# Patient Record
Sex: MALE | Race: WHITE | Employment: OTHER | URBAN - METROPOLITAN AREA
[De-identification: names, ages, dates, MRNs, and addresses within clinical notes are randomized per-mention and may not be internally consistent; named-entity substitution may affect disease eponyms.]

---

## 2017-02-27 ENCOUNTER — OFFICE VISIT (OUTPATIENT)
Dept: CARDIOLOGY CLINIC | Age: 67
End: 2017-02-27

## 2017-02-27 ENCOUNTER — OFFICE VISIT (OUTPATIENT)
Dept: NEUROLOGY | Age: 67
End: 2017-02-27

## 2017-02-27 ENCOUNTER — TELEPHONE (OUTPATIENT)
Dept: NEUROLOGY | Age: 67
End: 2017-02-27

## 2017-02-27 VITALS
BODY MASS INDEX: 36.58 KG/M2 | OXYGEN SATURATION: 98 % | HEART RATE: 62 BPM | DIASTOLIC BLOOD PRESSURE: 86 MMHG | WEIGHT: 247 LBS | SYSTOLIC BLOOD PRESSURE: 140 MMHG | HEIGHT: 69 IN

## 2017-02-27 VITALS
SYSTOLIC BLOOD PRESSURE: 128 MMHG | OXYGEN SATURATION: 98 % | BODY MASS INDEX: 36.67 KG/M2 | HEIGHT: 69 IN | HEART RATE: 63 BPM | DIASTOLIC BLOOD PRESSURE: 86 MMHG | WEIGHT: 247.6 LBS

## 2017-02-27 DIAGNOSIS — I10 ESSENTIAL HYPERTENSION: ICD-10-CM

## 2017-02-27 DIAGNOSIS — E78.2 MIXED HYPERLIPIDEMIA: ICD-10-CM

## 2017-02-27 DIAGNOSIS — I25.10 CORONARY ARTERY DISEASE INVOLVING NATIVE CORONARY ARTERY OF NATIVE HEART WITHOUT ANGINA PECTORIS: Primary | Chronic | ICD-10-CM

## 2017-02-27 DIAGNOSIS — M48.062 NEUROGENIC CLAUDICATION DUE TO LUMBAR SPINAL STENOSIS: Primary | ICD-10-CM

## 2017-02-27 NOTE — PROGRESS NOTES
Name:  Melecio Jorge      :  1950    PCP:   Yakelin Healy MD      Referring:  Self  MRN:   [id number]    Chief Complaint:   Chief Complaint   Patient presents with    Numbness     bilateral leg    Back Pain     L4-L5       HISTORY OF PRESENT ILLNESS:     This is a 79 y.o. male with a hx of lumbar spinal stenosis, cardiac stent who presents for evaluation of lower back pain and bilateral leg numbness. Patient describes that he was diagnosed with lumbar spinal stenosis (L4-5) around a year ago and was seen by Dr. Ewa Douglass. Underwent what sounds like 2 transforaminal epidural steroid injections (one time left side, one time right side) but only provided him a few days of relief after each one. Had a 3rd epidural steroid injection done by Dr. Chey Garrett in the early summer of  and patient says it lasted much longer, maybe 5-6 months. He recently got a 2nd orthopedic opinion from Dr. Shaila Whitney, and patient says that Dr. Anabel Elias recommended that patient undergo lumbar spine surgery. Patient's daughter advised patient to get Neurology input regarding management of his back pain before deciding whether to pursue surgery. 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 In terms of his pain, he describes that the more he stands and walks, he'll get increasing pain and pressure across the lower back and upper buttocks, intermittent tingling in his legs, and sciatic-radicular pain in either leg. The back pain is worse (moderate to severe) toward the end of the day or if he's been ambulating for long periods. He's found that doing graduated exercise in the mornings seems to reduce his pain throughout and toward the end of the day. Denies any constant, progressing weakness or numbness of either leg.   No difficulty controlling bowel or bladder. No MRI L-spine report or images available to review today    Complete Review of Systems: reviewed on intake H&P; refer to media section; otherwise as noted in HPI     Allergies   Allergen Reactions    Niaspan [Niacin] Other (comments)     Flushing and itching     Past Medical History:   Diagnosis Date    CAD (coronary artery disease)     Essential hypertension     Hyperlipidemia     Hypertension      Current Outpatient Prescriptions   Medication Sig Dispense Refill    metoprolol succinate (TOPROL-XL) 50 mg XL tablet TAKE 1 TABLET BY MOUTH DAILY 90 Tab 0    lisinopril (PRINIVIL, ZESTRIL) 10 mg tablet TAKE 1 TABLET BY MOUTH EVERY DAY 90 Tab 0    rosuvastatin (CRESTOR) 20 mg tablet Take 1 Tab by mouth nightly. 90 Tab 3    Cholecalciferol, Vitamin D3, (VITAMIN D3) 1,000 unit cap Take  by mouth.  tadalafil (CIALIS) 5 mg tablet Take 5 mg by mouth as needed.  multivitamin (ONE A DAY) tablet Take 1 Tab by mouth daily.  aspirin delayed-release 81 mg tablet Take 81 mg by mouth daily.  omega-3 fatty acids-vitamin e (FISH OIL) 1,000 mg cap Take 1 Cap by mouth.  buPROPion (WELLBUTRIN) 100 mg tablet Take  by mouth two (2) times a day. Past Surgical History:   Procedure Laterality Date    CARDIAC CATHETERIZATION  9/29/2011    LVEDP 23, LVG EF 50%, moderate inferior HK, LM normal, LAD calcified prox 60%, circumflex prox stent patent, OM 1 prox stent patent, RCA long prox/mid stent with 99% in-stent restenosis, T2 flow.  Distal short stent probably patent but slow filling hampers assessment    HX HEART CATHETERIZATION      x2  and then     HX PTCA      2 stents with  2004 then another stent  2005    STENT INSERTION  9/29/11    PCI of RCA with 3.0 x 18 mm Xience (mid) and 2.75 x 15 Xience (distal)    STRESS TEST CARDIOLITE  5/4/11    11 min, basal and mid inferior ischemia, EF 56%     Family History   Problem Relation Age of Onset    Coronary Artery Disease Mother     Coronary Artery Disease Father      Social History     Social History    Marital status: LEGALLY      Spouse name: N/A    Number of children: N/A    Years of education: N/A     Occupational History    dialysis nurse      Social History Main Topics    Smoking status: Never Smoker    Smokeless tobacco: Never Used    Alcohol use 1.2 oz/week     2 Glasses of wine, 0 Standard drinks or equivalent per week      Comment: one drink a week     Drug use: No    Sexual activity: Not Currently     Other Topics Concern    Not on file     Social History Narrative       PHYSICAL EXAM  Vitals:    02/27/17 0959   BP: 140/86   BP 1 Location: Left arm   BP Patient Position: Sitting   Pulse: 62   SpO2: 98%   Weight: 112 kg (247 lb)   Height: 5' 9\" (1.753 m)     MSK:  Lumbar spine  Mild tenderness in lower lubmar paraspinal area  No pain with back flexoin or standing upright  Mild pain with back extensoin  No worsening with facet loading to either side      General:  Alert, cooperative, NAD   Head:  Normocephalic, atraumatic. Eyes:  Conjunctivae/corneas clear   Lungs:  Heart:  Mild wheezing on left side (pt reports having a recent cold)  Regular rate, rhythm   Extremities: No edema. Skin: No rashes    Neurologic Exam       Language: normal  Memory:  Alert and oriented to person, place situation    Cranial Nerves:  I: smell Not tested   II: visual fields Grossly normal bilaterally   II: pupils Equal, round    II: optic disc Not tested, not relevant   III,VII: ptosis none   III,IV,VI: extraocular muscles  normal   V: mastication Not tested, not relevant   V: facial light touch sensation  Not tested, not relevant   VII: facial muscle function  symmetric   VIII: hearing symmetric   IX: soft palate elevation  Not tested, not relevant   XI: sternocleidomastoid strength 5/5   XII: tongue  midline      Motor: symmetric bulk and 5/ 5 strength in all extremities.   Sensory:    Mild reduced pinprick in left lateral lower leg, otherwise intact in both feet and legs. Cerebellar: No rest tremor on either side. Reflexes:   Trace reflexes at patellars and achilles (symmetric) Gait: normal       No outside clinic notes/ imaging reports available for review    ASSESSMENT AND PLAN    ICD-10-CM ICD-9-CM    1. Neurogenic claudication due to lumbar spinal stenosis M48.06 724.03      79 y.o. male with hx of lumbar spinal stenosis, neurogenic claudication    Worsening back pain and intermittent numbness and sciatica in both legs. Patient is interested in pursuing another lumbar SHERRY so that he can extend the time until he opts to have lumbar spine surgery. I discussed with him that its hard for me to formulate a plan today without reviewing his MRI report and images, and it would also be helpful to know the details of his last lumbar SHERRY as that one seemed to worked the best for him. He signed a SHERLYN to so I could get MRI L-spine report and the last injection report from Dr. Joaquín Valadez, and he left his last MRI CD so I can review. Pt will be going out of town for 1 month and when he returns, he was asked to make a follow up visit so we can go over the outside records and decide on which level of lumbar SHERRY to do for him. Marcel Stallings MD      Addendum/ 2-23-19:   Reviewed MRI L-spine image and MRI L-spine report. MRI images show disc bulge at L4-5 (moderate) and L5-S1 (mild) with what looks like severe spinal stenosis at L4-5, compressing the descending nerve roots. MRI report indicates the same. Additional information is that there doesn't appear to be any significant foraminal stenosis at L4-5, no significant spinal stenosis at L5-S1, and questionable right foraminal stenosis at right L5-S1. There is also a mild disc bulge at L1-2 with minimal spinal stenosis at that level, and multi-level facet arthropathy, mild at most levels, moderate at L4-5.

## 2017-02-27 NOTE — PROGRESS NOTES
Office Follow-up    NAME: Robbie Prater   :  1950  MRM:  [id number]    Date:  2017            Assessment:     Problem List  Date Reviewed: 10/30/2015          Codes Class Noted    Neurogenic claudication due to lumbar spinal stenosis ICD-10-CM: M48.06  ICD-9-CM: 724.03  2017        Prediabetes (Chronic) ICD-10-CM: R73.03  ICD-9-CM: 790.29  10/4/2011        HTN (hypertension) ICD-10-CM: I10  ICD-9-CM: 401.9  10/4/2011        Mixed hyperlipidemia ICD-10-CM: W54.9  ICD-9-CM: 272.2  2011    Overview Addendum 2015  9:53 AM by Shira Gomez MD     Lipids 10/5/10 -   HDL 39  LDLp- 2157, LDL-C 102   Lipids 10/5/10 -   HDL 34   Lipids 10/16/10 -  LDL 31 HDL 33 ,              CAD (coronary artery disease) (Chronic) ICD-10-CM: I25.10  ICD-9-CM: 414.00  2011    Overview Addendum 2011  8:33 AM by LESLEY Palomino     Inferior MI 2004  CATH:  2004- 2v CAD. LAD prox 40%, OM 80-90%, %  - ------PCI RCA with  stent  --------Staged PCI OM Aug 2004  AP 2005  CATH: 2005- new prox circ subtotal occlusion s/p PCI x 2 stents (Cypher), patent OM and RCA stents, EF 45% Maryblanche Packer [facility])  CATH: 11: LVEDP 23, LVG EF 50%, moderate inferior HK, LM normal, LAD calcified prox 60%, circumflex prox stent patent, OM 1 prox stent patent, RCA long prox/mid stent with 99% in-stent restenosis, T2 flow. Distal short stent probably patent but slow filling hampers assessment  ------PCI of RCA with 3.0 x 18 mm Xience (mid) and 2.75 x 15 Xience (distal)                      Plan:     1. CAD/ RCA stent/ LAD 60%: new symptoms of mild SOB and fatigue. We will do a stress nuclear study to rule out progression of CAD. 2. HTN: Stable. Continue current meds. 3. Dyslipidemia: continue crestor. Review labs from Partner MD performed today. 4. WESLEY: CPAP compliant. 5. FU in 6 months. Subjective:     carissa Villarreal 79 y.o. year-old who presents for 1 year follow up. He reports symptoms of claudication, tingling, numbness in legs approximately a year ago and was diagnosed with lumbar stenosis and has had multiple cortisone injections. He recently was told he likely needs surgery for this. He also reports recent mild fatigue and SOB. He is exercising as much as he can with symptoms of spinal stenosis. Exam:     Physical Exam:  Visit Vitals    /86 (BP 1 Location: Left arm, BP Patient Position: Sitting)    Pulse 63    Ht 5' 9\" (1.753 m)    Wt 247 lb 9.6 oz (112.3 kg)    SpO2 98%    BMI 36.56 kg/m2     General appearance - alert, well appearing, and in no distress  Mental status - affect appropriate to mood  Eyes - sclera anicteric, moist mucous membranes  Neck - supple, no significant adenopathy  Chest - clear to auscultation, no wheezes, rales or rhonchi  Heart - normal rate, regular rhythm, normal S1, S2, no murmurs, rubs, clicks or gallops  Abdomen - soft, nontender, nondistended, no masses or organomegaly  Extremities - peripheral pulses normal, no pedal edema  Skin - normal coloration  no rashes    Medications:     Current Outpatient Prescriptions   Medication Sig    metoprolol succinate (TOPROL-XL) 50 mg XL tablet TAKE 1 TABLET BY MOUTH DAILY    lisinopril (PRINIVIL, ZESTRIL) 10 mg tablet TAKE 1 TABLET BY MOUTH EVERY DAY    rosuvastatin (CRESTOR) 20 mg tablet Take 1 Tab by mouth nightly.  Cholecalciferol, Vitamin D3, (VITAMIN D3) 1,000 unit cap Take  by mouth.  multivitamin (ONE A DAY) tablet Take 1 Tab by mouth daily.  aspirin delayed-release 81 mg tablet Take 81 mg by mouth daily.  omega-3 fatty acids-vitamin e (FISH OIL) 1,000 mg cap Take 1 Cap by mouth.  tadalafil (CIALIS) 5 mg tablet Take 5 mg by mouth as needed.  buPROPion (WELLBUTRIN) 100 mg tablet Take  by mouth two (2) times a day. No current facility-administered medications for this visit.        Diagnostic Data Review: EKG:    Stress test cardiolite:5/4/11- basal and mid inferior ischemia, EF 56%      Cardiac catheterization:9/29/2011- LVEDP 23, LVG EF 50%, moderate inferior HK, LM normal, LAD calcified prox 60%, circumflex prox stent patent, OM 1 prox stent patent, RCA long prox/mid stent with 99% in-stent restenosis, T2 flow. Distal short stent probably patent but slow filling hampers assessment    Stent insertion:9/29/11- PCI of RCA with 3.0 x 18 mm Xience (mid) and 2.75 x 15 Xience (distal)        Lab Review:     Lab Results   Component Value Date/Time    Cholesterol, total 163 09/30/2011 06:10 AM    Cholesterol, Total 166 11/24/2015 12:00 AM    HDL Cholesterol 43 09/30/2011 06:10 AM    LDL,Direct 45 10/05/2010 08:23 AM    LDL, calculated 84.2 09/30/2011 06:10 AM    Triglyceride 179 09/30/2011 06:10 AM    CHOL/HDL Ratio 3.8 09/30/2011 06:10 AM     Lab Results   Component Value Date/Time    Creatinine 1.09 11/21/2013 05:30 PM     Lab Results   Component Value Date/Time    BUN 21 11/21/2013 05:30 PM     Lab Results   Component Value Date/Time    Potassium 4.7 11/21/2013 05:30 PM     Lab Results   Component Value Date/Time    Hemoglobin A1c 6.3 10/16/2010 06:37 AM    Hemoglobin A1c, External 6.2 02/21/2015     Lab Results   Component Value Date/Time    HGB 14.5 11/21/2013 05:30 PM     Lab Results   Component Value Date/Time    PLATELET 531 61/02/9595 05:30 PM     No results for input(s): CPK, CKMB, TROIQ in the last 72 hours. No lab exists for component: CKQMB, CPKMB             ___________________________________________________    Bernadette Nelson.  Ritesh Shin MD, [facility] [place]    This note was written by cecilio Garrett, as dictated by Liseth Mondragon MD.

## 2017-02-27 NOTE — PATIENT INSTRUCTIONS

## 2017-02-27 NOTE — MR AVS SNAPSHOT
Visit Information Date & Time Provider Department Dept. Phone Encounter #  
 2/27/2017 10:00 AM Paula Hanna MD Neurology [facility] [place] [phone] [id number] Your Appointments 2/27/2017  3:20 PM  
ESTABLISHED PATIENT with Aubrey Mortimer, MD  
[address] ([address]) Appt Note: 1 yr follow up; 11/8/16 lm on pts mobile # pls cb to rs kmr; 1 yr follow up/cardiac clearance [address] [facility] [place] [phone] [facility]  
[address] [facility] [place] [id number] [address] Upcoming Health Maintenance Date Due Hepatitis C Screening 1950 DTaP/Tdap/Td series (1 - Tdap) 2/1/1971 ZOSTER VACCINE AGE 60> 2/1/2010 GLAUCOMA SCREENING Q2Y 2/1/2015 Pneumococcal 65+ Low/Medium Risk (1 of 2 - PCV13) 2/1/2015 MEDICARE YEARLY EXAM 2/1/2015 FOBT Q 1 YEAR AGE 50-75 3/4/2016 INFLUENZA AGE 9 TO ADULT 8/1/2016 Allergies as of 2/27/2017  Review Complete On: 2/27/2017 By: Sania John LPN Severity Noted Reaction Type Reactions Niaspan [Niacin]  01/31/2011   Intolerance Other (comments) Flushing and itching Current Immunizations  Never Reviewed No immunizations on file. Not reviewed this visit You Were Diagnosed With   
  
 Codes Comments Neurogenic claudication due to lumbar spinal stenosis    -  Primary ICD-10-CM: M48.06 
ICD-9-CM: 724.03 Vitals BP  
  
  
  
  
  
 140/86 (BP 1 Location: Left arm, BP Patient Position: Sitting) BMI and BSA Data Body Mass Index Body Surface Area  
 36.48 kg/m 2 2.34 m 2 Preferred Pharmacy Pharmacy Name Phone [facility] [address]  [facility] [phone] Your Updated Medication List  
  
   
This list is accurate as of: 2/27/17 10:46 AM.  Always use your most recent med list.  
  
  
  
  
 aspirin delayed-release 81 mg tablet Take 81 mg by mouth daily. buPROPion 100 mg tablet Commonly known as:  [facility] Take  by mouth two (2) times a day. CIALIS 5 mg tablet Generic drug:  tadalafil Take 5 mg by mouth as needed. FISH OIL 1,000 mg Cap Generic drug:  omega-3 fatty acids-vitamin e Take 1 Cap by mouth.  
  
 lisinopril 10 mg tablet Commonly known as:  PRINIVIL, ZESTRIL  
TAKE 1 TABLET BY MOUTH EVERY DAY  
  
 metoprolol succinate 50 mg XL tablet Commonly known as:  TOPROL-XL  
TAKE 1 TABLET BY MOUTH DAILY  
  
 multivitamin tablet Commonly known as:  ONE A DAY Take 1 Tab by mouth daily. rosuvastatin 20 mg tablet Commonly known as:  CRESTOR Take 1 Tab by mouth nightly. VITAMIN D3 1,000 unit Cap Generic drug:  cholecalciferol Take  by mouth. Patient Instructions A Healthy Lifestyle: Care Instructions Your Care Instructions A healthy lifestyle can help you feel good, stay at a healthy weight, and have plenty of energy for both work and play. A healthy lifestyle is something you can share with your whole family. A healthy lifestyle also can lower your risk for serious health problems, such as high blood pressure, heart disease, and diabetes. You can follow a few steps listed below to improve your health and the health of your family. Follow-up care is a key part of your treatment and safety. Be sure to make and go to all appointments, and call your doctor if you are having problems. Its also a good idea to know your test results and keep a list of the medicines you take. How can you care for yourself at home? · Do not eat too much sugar, fat, or fast foods. You can still have dessert and treats now and then. The goal is moderation. · Start small to improve your eating habits. Pay attention to portion sizes, drink less juice and soda pop, and eat more fruits and vegetables. ¨ Eat a healthy amount of food. A 3-ounce serving of meat, for example, is about the size of a deck of cards. Fill the rest of your plate with vegetables and whole grains. ¨ Limit the amount of soda and sports drinks you have every day. Drink more water when you are thirsty. ¨ Eat at least 5 servings of fruits and vegetables every day. It may seem like a lot, but it is not hard to reach this goal. A serving or helping is 1 piece of fruit, 1 cup of vegetables, or 2 cups of leafy, raw vegetables. Have an apple or some carrot sticks as an afternoon snack instead of a candy bar. Try to have fruits and/or vegetables at every meal. 
· Make exercise part of your daily routine. You may want to start with simple activities, such as walking, bicycling, or slow swimming. Try to be active 30 to 60 minutes every day. You do not need to do all 30 to 60 minutes all at once. For example, you can exercise 3 times a day for 10 or 20 minutes. Moderate exercise is safe for most people, but it is always a good idea to talk to your doctor before starting an exercise program. 
· Keep moving. Gerardo Xiomara the lawn, work in the garden, or [employer]. Take the stairs instead of the elevator at work. · If you smoke, quit. People who smoke have an increased risk for heart attack, stroke, cancer, and other lung illnesses. Quitting is hard, but there are ways to boost your chance of quitting tobacco for good. ¨ Use nicotine gum, patches, or lozenges. ¨ Ask your doctor about stop-smoking programs and medicines. ¨ Keep trying. In addition to reducing your risk of diseases in the future, you will notice some benefits soon after you stop using tobacco. If you have shortness of breath or asthma symptoms, they will likely get better within a few weeks after you quit. · Limit how much alcohol you drink. Moderate amounts of alcohol (up to 2 drinks a day for men, 1 drink a day for women) are okay.  But drinking too much can lead to liver problems, high blood pressure, and other health problems. Family health If you have a family, there are many things you can do together to improve your health. · Eat meals together as a family as often as possible. · Eat healthy foods. This includes fruits, vegetables, lean meats and dairy, and whole grains. · Include your family in your fitness plan. Most people think of activities such as jogging or tennis as the way to fitness, but there are many ways you and your family can be more active. Anything that makes you breathe hard and gets your heart pumping is exercise. Here are some tips: 
¨ Walk to do errands or to take your child to school or the bus. ¨ Go for a family bike ride after dinner instead of watching TV. Where can you learn more? Go to http://florecita-frances.info/. Enter G634 in the search box to learn more about \"A Healthy Lifestyle: Care Instructions. \" Current as of: July 26, 2016 Content Version: 11.1 © 2314-3924 [employer]ove. Care instructions adapted under license by [employer] (which disclaims liability or warranty for this information). If you have questions about a medical condition or this instruction, always ask your healthcare professional. [address] any warranty or liability for your use of this information. Introducing [facility]! Dear Blayne Erazo: 
Thank you for requesting a Global Locate account. Our records indicate that you already have an active Global Locate account. You can access your account anytime at [email or web address] [employer]/Pocket Video Did you know that you can access your hospital and ER discharge instructions at any time in Global Locate? You can also review all of your test results from your hospital stay or ER visit. Additional Information If you have questions, please visit the Frequently Asked Questions section of the [email or web address] website at [email or web address]. [email or web address] [email or web address] Remember, [email or web address].com is NOT to be used for urgent needs. For medical emergencies, dial 911. Now available from your iPhone and Android! Please provide this summary of care documentation to your next provider. Your primary care clinician is listed as Jensen Barakat. If you have any questions after today's visit, please call [phone].

## 2017-02-27 NOTE — TELEPHONE ENCOUNTER
Faxed release of medical record form to [facility], Dr. Fredo Hyde for last injection report and [facility] for imaging reports.

## 2017-02-28 ENCOUNTER — DOCUMENTATION ONLY (OUTPATIENT)
Dept: CARDIOLOGY CLINIC | Age: 67
End: 2017-02-28

## 2017-02-28 NOTE — PROGRESS NOTES
Faxed request for most recent lab results to Dr. Boudreaux Aver office per Dr. Michael Orlando request.

## 2017-03-01 RX ORDER — METOPROLOL SUCCINATE 50 MG/1
TABLET, EXTENDED RELEASE ORAL
Qty: 90 TAB | Refills: 0 | Status: SHIPPED | OUTPATIENT
Start: 2017-03-01 | End: 2017-05-16 | Stop reason: SDUPTHER

## 2017-03-08 ENCOUNTER — TELEPHONE (OUTPATIENT)
Dept: NEUROLOGY | Age: 67
End: 2017-03-08

## 2017-03-16 ENCOUNTER — TELEPHONE (OUTPATIENT)
Dept: NEUROLOGY | Age: 67
End: 2017-03-16

## 2017-03-16 NOTE — TELEPHONE ENCOUNTER
Pt would like to schedule his Cortizone shot. Please call. Pt would like to schedule over the phone with the nurse the week of 10-14 of April.

## 2017-03-16 NOTE — TELEPHONE ENCOUNTER
Spoke with patient and scheduled his injections for Thursday April 20, 2017 at 2:00pm. Kavitha Cancer him Please arrive 1 hour prior to your appointment time for your procedure at the [place] [address] [facility]. A  will need to bring you to and from. Also nothing to eat or drink 4 hours prior to procedure. Patient voiced understanding and will call back if any further questions or concerns. Faxed surgical fax posting form to scheduling dept.

## 2017-03-29 DIAGNOSIS — I25.10 CORONARY ARTERY DISEASE INVOLVING NATIVE HEART WITHOUT ANGINA PECTORIS, UNSPECIFIED VESSEL OR LESION TYPE: Primary | ICD-10-CM

## 2017-03-29 RX ORDER — GLUCOSAM/CHONDRO/HERB 149/HYAL 750-100 MG
1000 TABLET ORAL DAILY
Qty: 30 CAP | Refills: 6
Start: 2017-03-29 | End: 2019-12-09

## 2017-03-29 RX ORDER — ROSUVASTATIN CALCIUM 40 MG/1
40 TABLET, COATED ORAL
Qty: 90 TAB | Refills: 3 | Status: SHIPPED | OUTPATIENT
Start: 2017-03-29 | End: 2018-05-06 | Stop reason: SDUPTHER

## 2017-03-29 NOTE — TELEPHONE ENCOUNTER
Refill of Crestor 40mg completed per VO of Dr. Chloe Feng. Pt requested 90 day supply. Verified pharmacy. Instructions given to pt about exercise stress test 4/3/17. Pt expressed understanding. Discussed NMR Lipoprofile with pt and sent lab slip to be drawn in 2 months. Address verified. Opportunities for questions, clarifications, and concerns provided.

## 2017-03-31 ENCOUNTER — TELEPHONE (OUTPATIENT)
Dept: CARDIOLOGY CLINIC | Age: 67
End: 2017-03-31

## 2017-03-31 NOTE — TELEPHONE ENCOUNTER
Left message to confirm nuclear appointment for 4-3-17 @ 10:00 am. Left message to hold Toprol and plan on being in office for 4 hrs. Left call back # [phone] if patient has any questions.

## 2017-04-03 ENCOUNTER — TELEPHONE (OUTPATIENT)
Dept: CARDIOLOGY CLINIC | Age: 67
End: 2017-04-03

## 2017-04-03 ENCOUNTER — CLINICAL SUPPORT (OUTPATIENT)
Dept: CARDIOLOGY CLINIC | Age: 67
End: 2017-04-03

## 2017-04-03 DIAGNOSIS — I25.10 CORONARY ARTERY DISEASE INVOLVING NATIVE CORONARY ARTERY OF NATIVE HEART WITHOUT ANGINA PECTORIS: Chronic | ICD-10-CM

## 2017-04-03 DIAGNOSIS — I10 ESSENTIAL HYPERTENSION: ICD-10-CM

## 2017-04-03 DIAGNOSIS — E78.2 MIXED HYPERLIPIDEMIA: ICD-10-CM

## 2017-04-03 DIAGNOSIS — R06.02 SOB (SHORTNESS OF BREATH): Primary | ICD-10-CM

## 2017-04-03 NOTE — PROGRESS NOTES
See scanned report. Dr. Sue Ruiz ordered and Dr. Sue Ruiz read study. ID verified per protocol. Pt  reported no symptoms at completion of protocol.

## 2017-04-20 ENCOUNTER — HOSPITAL ENCOUNTER (OUTPATIENT)
Age: 67
Setting detail: OUTPATIENT SURGERY
Discharge: HOME | End: 2017-04-20
Attending: PSYCHIATRY & NEUROLOGY | Admitting: PSYCHIATRY & NEUROLOGY
Payer: MEDICARE

## 2017-04-20 ENCOUNTER — APPOINTMENT (OUTPATIENT)
Dept: GENERAL RADIOLOGY | Age: 67
End: 2017-04-20
Attending: PSYCHIATRY & NEUROLOGY
Payer: MEDICARE

## 2017-04-20 VITALS
HEIGHT: 69 IN | WEIGHT: 247.8 LBS | SYSTOLIC BLOOD PRESSURE: 139 MMHG | OXYGEN SATURATION: 95 % | BODY MASS INDEX: 36.7 KG/M2 | TEMPERATURE: 97 F | HEART RATE: 52 BPM | DIASTOLIC BLOOD PRESSURE: 56 MMHG | RESPIRATION RATE: 18 BRPM

## 2017-04-20 PROCEDURE — 76210000046 HC AMBSU PH II REC FIRST 0.5 HR: Performed by: PSYCHIATRY & NEUROLOGY

## 2017-04-20 PROCEDURE — 76030000000 HC AMB SURG OR TIME 0.5 TO 1: Performed by: PSYCHIATRY & NEUROLOGY

## 2017-04-20 PROCEDURE — 74011636320 HC RX REV CODE- 636/320: Performed by: PSYCHIATRY & NEUROLOGY

## 2017-04-20 PROCEDURE — 74011250636 HC RX REV CODE- 250/636: Performed by: PSYCHIATRY & NEUROLOGY

## 2017-04-20 PROCEDURE — 74011000250 HC RX REV CODE- 250: Performed by: PSYCHIATRY & NEUROLOGY

## 2017-04-20 PROCEDURE — 77030003666 HC NDL SPINAL BD -A: Performed by: PSYCHIATRY & NEUROLOGY

## 2017-04-20 PROCEDURE — 76000 FLUOROSCOPY <1 HR PHYS/QHP: CPT

## 2017-04-20 RX ORDER — IBUPROFEN 200 MG
200 TABLET ORAL
COMMUNITY
End: 2019-07-24

## 2017-04-20 RX ORDER — NAPROXEN 250 MG/1
250 TABLET ORAL AS NEEDED
COMMUNITY

## 2017-04-20 RX ORDER — LIDOCAINE HYDROCHLORIDE 10 MG/ML
INJECTION, SOLUTION EPIDURAL; INFILTRATION; INTRACAUDAL; PERINEURAL AS NEEDED
Status: DISCONTINUED | OUTPATIENT
Start: 2017-04-20 | End: 2017-04-20 | Stop reason: HOSPADM

## 2017-04-20 RX ORDER — BETAMETHASONE SODIUM PHOSPHATE AND BETAMETHASONE ACETATE 3; 3 MG/ML; MG/ML
INJECTION, SUSPENSION INTRA-ARTICULAR; INTRALESIONAL; INTRAMUSCULAR; SOFT TISSUE AS NEEDED
Status: DISCONTINUED | OUTPATIENT
Start: 2017-04-20 | End: 2017-04-20 | Stop reason: HOSPADM

## 2017-04-20 NOTE — IP AVS SNAPSHOT
Summary of Care Report The Summary of Care report has been created to help improve care coordination. Users with access to [employer] or [address] (Web-based application) may access additional patient information including the Discharge Summary. If you are not currently a [address] user and need more information, please call the number listed below in the [place] section and ask to be connected with Medical Records. Facility Information Name Address Phone [address] [place] [id number] [phone] Patient Information Patient Name Sex  Yoni Valentin ([id number]) Male 1950 Discharge Information Admitting Provider Service Area Unit Sammi Fernandez MD / [address] [place] [address] / [phone] Discharge Provider Discharge Date/Time Discharge Disposition Destination (none) (none) (none) (none) Patient Language Language ENGLISH [13] Hospital Problems as of 2017  Reviewed: 2017  8:59 AM by Estella Guthrie MD  
 None Non-Hospital Problems as of 2017  Reviewed: 2017  8:59 AM by Estella Guthrie MD  
  
  
  
 Class Noted - Resolved Last Modified Active Problems Mixed hyperlipidemia  2011 - Present 2015 by Estella Guthrie MD  
  Entered by Mikey Martines MD  
  Overview Addendum 2015  9:53 AM by Estella Guthrie MD  
   Lipids 10/5/10 -   HDL 39  LDLp- 2157, LDL-C 102 Lipids 10/5/10 -   HDL 34  Lipids 10/16/10 -  LDL 31 HDL 33 ,  CAD (coronary artery disease) (Chronic)  2011 - Present 2011 Entered by Mikey Martnies MD  
  Overview Addendum 2011  8:33 AM by LESLEY Carter Inferior MI 2004 CATH:  2004- 2v CAD. LAD prox 40%, OM 80-90%, % - ------PCI RCA with  stent 
--------Staged PCI OM Aug 2004 AP 7/2005 CATH: 7/2005- new prox circ subtotal occlusion s/p PCI x 2 stents (Cypher), patent OM and RCA stents, EF 45% Charmayne Kroner @ [facility]) CATH: 9/29/11: LVEDP 23, LVG EF 50%, moderate inferior HK, LM normal, LAD calcified prox 60%, circumflex prox stent patent, OM 1 prox stent patent, RCA long prox/mid stent with 99% in-stent restenosis, T2 flow. Distal short stent probably patent but slow filling hampers assessment 
------PCI of RCA with 3.0 x 18 mm Xience (mid) and 2.75 x 15 Xience (distal) Prediabetes (Chronic)  10/4/2011 - Present 10/4/2011 by Antonio Smith MD  
  Entered by Antonio Smith MD  
  HTN (hypertension)  10/4/2011 - Present 10/4/2011 by Antonio Smith MD  
  Entered by Antonio Smith MD  
  Neurogenic claudication due to lumbar spinal stenosis  2/27/2017 - Present 2/27/2017 by Racheal Fontenot MD  
  Entered by Racheal Fontenot MD  
  
You are allergic to the following Allergen Reactions Niaspan (Niacin) Other (comments) Flushing and itching Current Discharge Medication List  
  
ASK your doctor about these medications Dose & Instructions Dispensing Information Comments  
 aspirin delayed-release 81 mg tablet Dose:  81 mg Take 81 mg by mouth daily. Refills:  0 CIALIS 5 mg tablet Generic drug:  tadalafil Dose:  5 mg Take 5 mg by mouth as needed. Refills:  0  
   
 FISH OIL 1,000 mg Cap Generic drug:  omega-3 fatty acids-vitamin e Dose:  1 Cap Take 1 Cap by mouth. Refills:  0  
   
 ibuprofen 200 mg tablet Commonly known as:  MOTRIN Dose:  200 mg Take 200 mg by mouth. Refills:  0  
   
 lisinopril 10 mg tablet Commonly known as:  PRINIVIL, ZESTRIL  
 TAKE 1 TABLET BY MOUTH EVERY DAY Quantity:  90 Tab Refills:  0  
   
 metoprolol succinate 50 mg XL tablet Commonly known as:  TOPROL-XL  
 TAKE 1 TABLET BY MOUTH DAILY Quantity:  90 Tab Refills:  0  
   
 multivitamin tablet Commonly known as:  ONE A DAY Dose:  1 Tab Take 1 Tab by mouth daily. Refills:  0  
   
 naproxen 250 mg tablet Commonly known as:  NAPROSYN Dose:  250 mg Take 250 mg by mouth two (2) times daily (with meals). Refills:  0 Omega-3-DHA-EPA-Fish Oil 1,000 mg (120 mg-180 mg) Cap Commonly known as:  FISH OIL Dose:  1000 mg Take 1,000 mg by mouth daily. Quantity:  30 Cap Refills:  6  
   
 rosuvastatin 40 mg tablet Commonly known as:  CRESTOR Dose:  40 mg Take 1 Tab by mouth nightly. Quantity:  90 Tab Refills:  3 VITAMIN D3 1,000 unit Cap Generic drug:  cholecalciferol Take  by mouth. Refills:  0 Surgery Information ID Date/Time Status Primary Surgeon All Procedures Location [id number] 4/20/2017 1400 Unposted Toñito Armstrong MD BILATERAL L4-5 TRANSFORAMINAL EPIDURAL STEROID INJECTION [facility] Follow-up Information None Discharge Instructions AFTER-CARE INSTRUCTIONS 
 
EPIDURAL STEROID INJECTION 
TRANSFORAMINAL EPIDURAL STEROID INJECTION 
CAUDAL EPIDURAL STEROID INJECTION Dr. Toñito Armstrong [address] [facility] Neurology Neurology [place] [address], [address] [place] [address] Phone: [phone] Fax: [phone] About Your Pain Injection Additional injection locations shown for teaching purposes (Image ©Matias Peacock 89 is licensed under [employer] Attribution 3.0 Unported license. Image found at http://commons. wikimedia.org/wiki/File%3ABlausen_0354_EpiduralSteroidInjection. png Accessed 10/27/13) Dr. Gwyn Mckeon has examined you. He thinks an Epidural Injection will ease your pain. The epidural space is around the spinal cord and nerves. The picture shows the shot in the low back.   Pain shots can be given anywhere in the back or neck. The place where the medicine goes can get smaller with age. The place gets smaller with arthritis too. This makes giving the shot more challenging.  Numbing medicine and a steroid are in the shot. You can have some tingling and numbness for a few hours. NO DRIVING or OPERATING MACHINERY FOR 24 HOURS. The steroid part of the shot takes 1 week to work. Be patient. After the Pain Shot We will watch you for a few minutes. We will review these instructions. You can ask questions. Then you can go home. Someone else must drive you home  you might have numbness or tingling in your legs. Possible short-term side effects include: ? Increased soreness or ache for up to 1 week 
? Headaches ? Trouble sleeping  
? Facial redness ? Hiccups ? Dizziness or low blood pressure At [facility] At home after the shot, do the following: ? Follow these instructions ? Rest  
? Do not drive - you can drive tomorrow if you feel it is safe ? Use ice packs for soreness ? No baths, pools, or hot tubs for 24 hours ? Remind yourself it takes a few days for the steroid to ease swelling and pain. Be patient. ? Restart your regular activity the day after the shot when you feel you can ? Moving around helps. Restart your exercises as soon as you can.  
? Afterward you will be sore when the numbing medicine goes away. ? The soreness will go away when the steroid begins to work. Follow Up: 
Call [phone] to see Dr. Nikita Yo in 3-4 weeks. Call Doctor Stock if:  
? You see redness, swelling or puss from the shot ? You have other signs of infection like fever and chills ? You are short of breath or have chest pain ? You feel sick or are vomiting ? You feel pain or have a headache that is worse than you have ever felt ? You feel numb, tingling, pain, or are weak in the arms, hands, legs, or feet, especially if it is getting worse ? You cannot pee or poop; or if you begin to have accidents  you pee or poop when you didnt mean to If you have an emergency, call 911 for help right away. DISCHARGE SUMMARY Here are some useful instructions and tips for after your injection (shot). FIRST: 
 
Our goal is to ease the pain you have; We do not try to make the pain go away. We want you to have an active life through better pain control. NOW: 
 
? If you were given medicine to make you relax;  
? If you were given medicine to make you sleepy; 
? If you will be taking pain medicine; 
o Take it easy  rest 
o Do not drive or use dangerous machines 
o Save important personal or work decisions for later 
o Do not drink alcohol 
o DO ALL OF THESE for the next 24 hours AND while you take pain medicine LATER: 
 
? Give a current list of all your medicine to all your doctors. ? Always carry a list of your medicine  just in case. ? Update your list of medicines if it changes  if a medicine is stopped or added. [address]  Activation Information is easy to find with the internet. This includes your [address] ([facility] for short.)  If you use the internet, you can use  V[employer] Mobile. With V[employer] Mobile,  you can: 
? Send messages to your doctor,  
? View your test results,  
? Renew prescriptions, 
? Schedule appointments, and more. Follow these instructions to access your online Personal Health Information safely. How Do I Sign Up? 1. In your internet browser, go to: 
[email or web address]. 2. Click on the   First Time User? Click Here   link in the Sign In box. You will see the New Member Sign Up page. 3. Enter your V[employer] Mobile Access Code (see below) exactly as it appears. After you complete the sign-up process, you will not need this code. [employer] Access Code: Activation code not generated Current [employer] Mobile Status: Active 4. Type the last four numbers of your Social Security Number (xxxx). Type your Birth Date (mm/dd/yyyy) in the blanks. Click Submit.  5. Create a  [employer] Username. Your [employer] username cannot be changed. Think of one that is secure and easy to remember. Write it down if you have memory problems. Keep your info in a safe place. 6. Create a  [employer] password. You can change your password at any time once you are in your [employer] account. 7. Enter your  [place] Question  and  Answer. If you forget your password you can still enter the web site. 8. Enter your E-mail Address. You will receive an e-mail when new information shows up in [address] 9. Click   Sign Up.  Now you can view and download portions of your medical record. 10. Click the   Download Summary   menu link to download a portable copy of your medical information. If the expiration date has passed, you must request a new code. Additional Information If you have questions, call  [phone]. Remember, [employer] is NOT for urgent needs. For medical emergencies, dial 911. TO BE GOOD HELP®, WE GIVE THE FOLLOWING: 
 
For Healthy Living: · Do not smoke*. Avoid Tobacco products. Do not go around second-hand smoke. · Eat a healthy diet. Weigh yourself weekly. Exercise regularly. · Your risk of illness goes up with obesity and an inactive lifestyle. Surgeon General's Warning:  Stopping smoking now greatly reduces serious risk to your health. About Heart Failure: Know your heart history. If you have HEART FAILURE, you may retain fluid. If you:   
 
· Gain weight of 3 pounds (or more) overnight; 
· Gain weight of 5 pounds (or more) in a week; · Swelling in the hands or feet; 
· Get short of breath with no effort · Cannot lay flat in bed CALL YOUR DOCTOR RIGHT AWAY  DO NOT WAIT FOR YOUR NEXT APPOINTMENT Signs of a StrokE: 
 
 There is a simple memory trick you can use called F.A.S.T.: 
 
F - Face looks uneven A - Arms unable to move together or evenly S - Speech does not sound right, or no speech at all T - Time - CALL 911 as soon as these signs happen - DO NOT WAIT to see if you get better - TIME MEANS MORE BRAIN INJURY. The PATIENT has reviewed the discharge information with the nurse. The PATIENT has asked, and had their questions answered. The PATIENT says they understand these instructions. Chart Review Routing History Recipient Method Report Sent By Leopold Stake Gilman Coombs Fax: [phone] Fax Note Review Autumn Wells RN [[id number]] 4/14/2014  3:45 PM 04/14/2014 Abelardo Israel MD  
Phone: [phone] In Basket bshsi amb office visit enc summ w/hx Autumn Wells RN [[id number]] 5/5/2014 10:15 AM 04/07/2014 Lois Pedraza MD  
Fax: [phone] Phone: [phone] Fax Juan Manuel Batista MD NOTES AUTO ROUTING REPORT Robert Dick MD [[id number]] 4/20/2017  1:36 PM 04/20/2017

## 2017-04-20 NOTE — IP AVS SNAPSHOT
[place] 
 
 
 [address] 
[phone] Patient: Oliver Perales MRN: [id number] SPO:[birth year] You are allergic to the following Allergen Reactions Niaspan (Niacin) Other (comments) Flushing and itching Recent Documentation Height Weight BMI Smoking Status 1.753 m 112.4 kg 36.59 kg/m2 Never Smoker Emergency Contacts Name Discharge Info Relation Home Work Mobile Darleen Nice  Daughter [21] [phone] [address] CAREGIVER [3] Son [22] [phone] About your hospitalization You were admitted on:  April 20, 2017 You last received care in the:  [facility] PACU You were discharged on:  April 20, 2017 Unit phone number:  [phone] Why you were hospitalized Your primary diagnosis was:  Not on File Providers Seen During Your Hospitalizations Provider Role Specialty Primary office phone Michael Tejeda MD Attending Provider Neurology [phone] Your Primary Care Physician (PCP) Primary Care Physician Office Phone Office Fax [address] [facility] [phone] Follow-up Information None Current Discharge Medication List  
  
ASK your doctor about these medications Dose & Instructions Dispensing Information Comments Morning Noon Evening Bedtime  
 aspirin delayed-release 81 mg tablet Your last dose was: Your next dose is:    
   
   
 Dose:  81 mg Take 81 mg by mouth daily. Refills:  0 CIALIS 5 mg tablet Generic drug:  tadalafil Your last dose was: Your next dose is:    
   
   
 Dose:  5 mg Take 5 mg by mouth as needed. Refills:  0  
     
   
   
   
  
 FISH OIL 1,000 mg Cap Generic drug:  omega-3 fatty acids-vitamin e Your last dose was: Your next dose is: Dose:  1 Cap Take 1 Cap by mouth. Refills:  0  
     
   
   
   
  
 ibuprofen 200 mg tablet Commonly known as:  MOTRIN Your last dose was: Your next dose is:    
   
   
 Dose:  200 mg Take 200 mg by mouth. Refills:  0  
     
   
   
   
  
 lisinopril 10 mg tablet Commonly known as:  Shirlie Mo Your last dose was: Your next dose is: TAKE 1 TABLET BY MOUTH EVERY DAY Quantity:  90 Tab Refills:  0  
     
   
   
   
  
 metoprolol succinate 50 mg XL tablet Commonly known as:  TOPROL-XL Your last dose was: Your next dose is: TAKE 1 TABLET BY MOUTH DAILY Quantity:  90 Tab Refills:  0  
     
   
   
   
  
 multivitamin tablet Commonly known as:  ONE A DAY Your last dose was: Your next dose is:    
   
   
 Dose:  1 Tab Take 1 Tab by mouth daily. Refills:  0  
     
   
   
   
  
 naproxen 250 mg tablet Commonly known as:  NAPROSYN Your last dose was: Your next dose is:    
   
   
 Dose:  250 mg Take 250 mg by mouth two (2) times daily (with meals). Refills:  0 Omega-3-DHA-EPA-Fish Oil 1,000 mg (120 mg-180 mg) Cap Commonly known as:  FISH OIL Your last dose was: Your next dose is:    
   
   
 Dose:  1000 mg Take 1,000 mg by mouth daily. Quantity:  30 Cap Refills:  6  
     
   
   
   
  
 rosuvastatin 40 mg tablet Commonly known as:  CRESTOR Your last dose was: Your next dose is:    
   
   
 Dose:  40 mg Take 1 Tab by mouth nightly. Quantity:  90 Tab Refills:  3 VITAMIN D3 1,000 unit Cap Generic drug:  cholecalciferol Your last dose was: Your next dose is: Take  by mouth. Refills:  0 Discharge Instructions AFTER-CARE INSTRUCTIONS 
 
EPIDURAL STEROID INJECTION 
 TRANSFORAMINAL EPIDURAL STEROID INJECTION 
CAUDAL EPIDURAL STEROID INJECTION Dr. Gisselle Molina [facility] Neurology Neurology [place] [address], [address] Yanira Caro Phone: [phone] Fax: [phone] About Your Pain Injection Additional injection locations shown for teaching purposes (Image Matias Cornejo 89 is licensed under [employer] Attribution 3.0 Unported license. Image found at http://commons. wikiYouSciencea.org/wiki/File%3ABlausen_0354_EpiduralSteroidInjection. png Accessed 10/27/13) Dr. Katharina Pack has examined you. He thinks an Epidural Injection will ease your pain. The epidural space is around the spinal cord and nerves. The picture shows the shot in the low back. Pain shots can be given anywhere in the back or neck. The place where the medicine goes can get smaller with age. The place gets smaller with arthritis too. This makes giving the shot more challenging.  Numbing medicine and a steroid are in the shot. You can have some tingling and numbness for a few hours. NO DRIVING or OPERATING MACHINERY FOR 24 HOURS. The steroid part of the shot takes 1 week to work. Be patient. After the Pain Shot We will watch you for a few minutes. We will review these instructions. You can ask questions. Then you can go home. Someone else must drive you home  you might have numbness or tingling in your legs. Possible short-term side effects include: ? Increased soreness or ache for up to 1 week 
? Headaches ? Trouble sleeping  
? Facial redness ? Hiccups ? Dizziness or low blood pressure At [facility] At home after the shot, do the following: ? Follow these instructions ? Rest  
? Do not drive - you can drive tomorrow if you feel it is safe ? Use ice packs for soreness ? No baths, pools, or hot tubs for 24 hours ? Remind yourself it takes a few days for the steroid to ease swelling and pain. Be patient. ? Restart your regular activity the day after the shot when you feel you can ? Moving around helps. Restart your exercises as soon as you can.  
? Afterward you will be sore when the numbing medicine goes away. ? The soreness will go away when the steroid begins to work. Follow Up: 
Call [phone] to see Dr. Diane Keenan in 3-4 weeks. Call Doctor Dayami if:  
? You see redness, swelling or puss from the shot ? You have other signs of infection like fever and chills ? You are short of breath or have chest pain ? You feel sick or are vomiting ? You feel pain or have a headache that is worse than you have ever felt ? You feel numb, tingling, pain, or are weak in the arms, hands, legs, or feet, especially if it is getting worse ? You cannot pee or poop; or if you begin to have accidents  you pee or poop when you didnt mean to If you have an emergency, call 911 for help right away. DISCHARGE SUMMARY Here are some useful instructions and tips for after your injection (shot). FIRST: 
 
Our goal is to ease the pain you have; We do not try to make the pain go away. We want you to have an active life through better pain control. NOW: 
 
? If you were given medicine to make you relax;  
? If you were given medicine to make you sleepy; 
? If you will be taking pain medicine; 
o Take it easy  rest 
o Do not drive or use dangerous machines 
o Save important personal or work decisions for later 
o Do not drink alcohol 
o DO ALL OF THESE for the next 24 hours AND while you take pain medicine LATER: 
 
? Give a current list of all your medicine to all your doctors. ? Always carry a list of your medicine  just in case. ? Update your list of medicines if it changes  if a medicine is stopped or added. [address] [facility]  Activation Information is easy to find with the internet. This includes your [address] ([facility] for short.)  If you use the internet, you can use  [employer]. With [employer],  you can: 
? Send messages to your doctor,  
? View your test results,  
? Renew prescriptions, 
? Schedule appointments, and more. Follow these instructions to access your online Personal Health Information safely. How Do I Sign Up? 1. In your internet browser, go to: 
[email or web address] 2. Click on the   First Time User? Click Here   link in the Sign In box. You will see the New Member Sign Up page. 3. Enter your [employer] Access Code (see below) exactly as it appears. After you complete the sign-up process, you will not need this code. [employer] Access Code: Activation code not generated Current [employer] Status: Active 4. Type the last four numbers of your Social Security Number (xxxx). Type your Birth Date (mm/dd/yyyy) in the blanks. Click Submit.  5. Create a  [employer] Username. Your [employer] username cannot be changed. Think of one that is secure and easy to remember. Write it down if you have memory problems. Keep your info in a safe place. 6. Create a  [employer] password. You can change your password at any time once you are in your [employer] account. 7. Enter your  [place] Question  and  Answer. If you forget your password you can still enter the web site. 8. Enter your E-mail Address. You will receive an e-mail when new information shows up in [address] 9. Click   Sign Up.  Now you can view and download portions of your medical record. 10. Click the   Download Summary   menu link to download a portable copy of your medical information. If the expiration date has passed, you must request a new code. Additional Information If you have questions, call  [phone]. Remember, [employer] is NOT for urgent needs. For medical emergencies, dial 911. TO BE GOOD HELP®, WE GIVE THE FOLLOWING: 
 
For Healthy Living: · Do not smoke*. Avoid Tobacco products. Do not go around second-hand smoke. · Eat a healthy diet. Weigh yourself weekly. Exercise regularly. · Your risk of illness goes up with obesity and an inactive lifestyle. Surgeon General's Warning:  Stopping smoking now greatly reduces serious risk to your health. About Heart Failure: Know your heart history. If you have HEART FAILURE, you may retain fluid. If you:   
 
· Gain weight of 3 pounds (or more) overnight; 
· Gain weight of 5 pounds (or more) in a week; · Swelling in the hands or feet; 
· Get short of breath with no effort · Cannot lay flat in bed CALL YOUR DOCTOR RIGHT AWAY  DO NOT WAIT FOR YOUR NEXT APPOINTMENT Signs of a StrokE: There is a simple memory trick you can use called F.A.S.T.: 
 
F - Face looks uneven A - Arms unable to move together or evenly S - Speech does not sound right, or no speech at all T - Time - CALL 911 as soon as these signs happen - DO NOT WAIT to see if you get better - TIME MEANS MORE BRAIN INJURY. The PATIENT has reviewed the discharge information with the nurse. The PATIENT has asked, and had their questions answered. The PATIENT says they understand these instructions. Discharge Orders None Introducing [facility]! Dear Christian Garza: 
Thank you for requesting a DocuTAP account. Our records indicate that you already have an active DocuTAP account. You can access your account anytime at [email or web address] Did you know that you can access your hospital and ER discharge instructions at any time in DocuTAP? You can also review all of your test results from your hospital stay or ER visit. Additional Information If you have questions, please visit the Frequently Asked Questions section of the Meet You website at [email or web address] Remember, MyChart is NOT to be used for urgent needs. For medical emergencies, dial 911. Now available from your iPhone and Android! General Information Please provide this summary of care documentation to your next provider. Patient Signature:  ____________________________________________________________ Date:  ____________________________________________________________  
  
[facility] Provider Signature:  ____________________________________________________________ Date:  ____________________________________________________________

## 2017-04-20 NOTE — OP NOTES
Lumbar Transforaminal Epidural Steroid Injection  Procedure Note    Date of Procedure:   4/20/2017  Name:     Lynne Perry  YOB: 1950  MRN:     [id number]    Pre-operative diagnosis:  Lumbar radiculitis  Post-operative diagnosis:  Same    Procedure:     Lumbar Transformainal Epidural Steroid Injection    Anesthesia/ Sedation:   Local anesthetic 1% lidocaine    Reason for procedure: This is a 79 y.o. male brought in today for Left lumbar transforaminal epidural steroid injection to treat severe lower back pain and bilateral radicular pain due to lumbar spinal stenosis at L4-L5 distribution. The purpose of this procedure is to place potent corticosteroid medication into the anterior epidural space to reduce radicular pain    Consent: After discussing potential risks, potential benefits, and alternatives with the patient, the patient gave written and verbal consent to proceed with planned procedure. Procedure: The patient was brought back into the procedure room, and placed in the prone position on the fluoroscopy table, with a pillow under the abdomen. EKG, heart rate, pulse ox, and blood pressure were monitored throughout the procedure. Time out was done to confirm patient's identity and verify the nature and location of the procedure. The lumbosacral area was prepped with ChloraPrep x 2 and then draped in the usual sterile fashion. Left L4 transforaminal epidural steroid injection    The L4-5 disc space was identified in the AP view. The c-arm was rotated obliquely to the ipsilateral side, positioning the L5 SAP at the 6 o'clock position under the L4 pedicle. The skin was anesthetized with 1% lidocaine, and a bent-tip 5 inch, 22-G spinal needle was guided in the coaxial approach to the L4 foramen under the midportion of the pedicle. Final positioning was made with lateral fluoroscopy.   The C-arm was rotated back to AP view and after negative aspiration, Isovue 180 was injected under realtime fluoroscopy and showed good spread of contrast in the epidural space as well as distally along the nerve root. No vascular uptake was noted. Next, a total of 1 mL Betamethasone 6 mg/ mL and 0.5 mL Lidocaine 1% was injected slowly. The needle was re-styletted and removed. Right L4 transforaminal epidural steroid injection    The c-arm was rotated obliquely to the ipsilateral side, positioning the L5 SAP at the 6 o'clock position under the L4 pedicle. The skin was anesthetized with 1% lidocaine, and a bent-tip 5 inch, 22-G spinal needle was guided in the coaxial approach to the L4 foramen under the midportion of the pedicle. Final positioning was made with lateral fluoroscopy. The C-arm was rotated back to AP view and after negative aspiration, Isovue 180 was injected under realtime fluoroscopy. Despite correct placement in the lateral, anterior, and oblique views, the contrast flow was only marginally medial and more distally down the nerve root. This pattern was suspected to be due to the known severe lumbar spinal stenosis at this level. After negative aspiration, a total of 1 mL Betamethasone 6 mg/ mL and 0.5 mL Lidocaine 1% was injected slowly. The needle was re-styletted and removed. The patient tolerated the procedure well, then was turned onto the supine position on the stretcher and taken to the recovery area in stable condition. After post-procedure observation, the patient was discharged home with post-injection instructions.       Estimated Blood Loss:  None    Signed By: Diane Messina MD                        April 20, 2017

## 2017-04-20 NOTE — H&P
Procedural Case Note    4/20/2017    (12:30 PM)    Natan Martínez    1950   (79 y.o.)    [id number]    CC:  Severe back pain, bilateral leg pain, spinal stenosis    Hx: 79 y.o. male with hx of lumbar spinal stenosis (severe canal stenosis but no significant foraminal narrowing at L4-5, minimal canal stenosis at L2-3, remaining levels without significant canal or foraminal stenosis) who presents today to undergo lumbar epidural steroid injection to address his severe lower back and bilateral leg pains. He is attempting to delay back surgery. ROS:   Complete ROS obtained, no CP, no SOB, no N or V    PMH:     Past Medical History:   Diagnosis Date    CAD (coronary artery disease)     Essential hypertension     Hyperlipidemia     Hypertension        ALLERGIES:     Allergies   Allergen Reactions    Niaspan [Niacin] Other (comments)     Flushing and itching       MEDS:     No current facility-administered medications for this encounter. Visit Vitals    /48 (BP Patient Position: At rest)    Pulse (!) 57    Temp 97 °F (36.1 °C)    Resp 18    Ht 5' 9\" (1.753 m)    Wt 112.4 kg (247 lb 12.8 oz)    SpO2 99%    BMI 36.59 kg/m2     PE:  Gen: NAD  Head: normocephalic  Heart: RRR  Lungs: CTA omar  Abd: NT, ND, soft  Neuro: awake and alert  Skin: intact    IMPRESSION:  Severe back and bilateral leg pains due to lumbar spinal stenosis/ neurogenic claudication  Plan: proceed with bilateral L4 transforaminal epidural steroid injection      Note:  The clinical status was discussed in detail with the patient. The procedure was again discussed and described in detail. All understand and accept the planned procedure and risks; reject other forms of treatment. All questions are answered.     Mikaela Crum MD

## 2017-04-20 NOTE — DISCHARGE INSTRUCTIONS
AFTER-CARE INSTRUCTIONS    EPIDURAL STEROID INJECTION  TRANSFORAMINAL EPIDURAL STEROID INJECTION  CAUDAL EPIDURAL STEROID INJECTION    Dr. Caron Tinsley [facility]  [facility] [address], [address]  Phone: [phone]  Fax: [phone]    About Your Pain Injection       Additional injection locations shown for teaching purposes    (Image ©Matias Peacock 89 is licensed under the Creative Commons Attribution 3.0 Unported license. Image found at http://commons. wikiSendoria.org/wiki/File%3ABlausen_0354_EpiduralSteroidInjection. png Accessed 10/27/13)      Dr. Osvaldo Ocampo has examined you. He thinks an Epidural Injection will ease your pain. The epidural space is around the spinal cord and nerves. The picture shows the shot in the low back. Pain shots can be given anywhere in the back or neck. The place where the medicine goes can get smaller with age. The place gets smaller with arthritis too. This makes giving the shot more challenging.     Numbing medicine and a steroid are in the shot. You can have some tingling and numbness for a few hours. NO DRIVING or OPERATING MACHINERY FOR 24 HOURS. The steroid part of the shot takes 1 week to work. Be patient. After the Pain Shot   We will watch you for a few minutes. We will review these instructions. You can ask questions. Then you can go home. Someone else must drive you home - you might have numbness or tingling in your legs.      Possible short-term side effects include:    Increased soreness or ache for up to 1 week   Headaches    Trouble sleeping    Facial redness   Hiccups    Dizziness or low blood pressure    At Home  At home after the shot, do the following:    Follow these instructions   Rest    Do not drive - you can drive tomorrow if you feel it is safe    Use ice packs for soreness    No baths, pools, or hot tubs for 24 hours   Remind yourself it takes a few days for the steroid to ease swelling and pain. Be patient.  Restart your regular activity the day after the shot when you feel you can     Moving around helps. Restart your exercises as soon as you can.  Afterward you will be sore when the numbing medicine goes away.  The soreness will go away when the steroid begins to work. Follow Up:  Call [phone] to see Dr. Nikita Yo in 3-4 weeks. Call Doctor Dayami if:    You see redness, swelling or puss from the shot    You have other signs of infection like fever and chills    You are short of breath or have chest pain   You feel sick or are vomiting   You feel pain or have a headache that is worse than you have ever felt    You feel numb, tingling, pain, or are weak in the arms, hands, legs, or feet, especially if it is getting worse   You cannot pee or poop; or if you begin to have accidents - you pee or poop when you didnt mean to     If you have an emergency, call 911 for help right away. DISCHARGE SUMMARY     Here are some useful instructions and tips for after your injection (shot). FIRST:    Our goal is to ease the pain you have; We do not try to make the pain go away. We want you to have an active life through better pain control. NOW:     If you were given medicine to make you relax;    If you were given medicine to make you sleepy;   If you will be taking pain medicine;  o Take it easy - rest  o Do not drive or use dangerous machines  o Save important personal or work decisions for later  o Do not drink alcohol  o DO ALL OF THESE for the next 24 hours AND while you take pain medicine    LATER:     Give a current list of all your medicine to all your doctors.  Always carry a list of your medicine - just in case.  Update your list of medicines if it changes - if a medicine is stopped or added.     MyChart® Activation    Information is easy to find with the internet. This includes your [address] (PHI for short.)  If you use the internet, you can use  coUrbanize. With coUrbanize,  you can:   Send messages to your doctor,   [facility] your test results,    Renew prescriptions,   Schedule appointments, and more. Follow these instructions to access your online Personal Health Information safely. How Do I Sign Up? 1. In your internet browser, go to:  [email or web address]. 2. Click on the   First Time User? Click Here   link in the Sign In box. You will see the New Member Sign Up page. 3. Enter your coUrbanize Access Code (see below) exactly as it appears. After you complete the sign-up process, you will not need this code. coUrbanize Access Code: Activation code not generated  Current coUrbanize Status: Active     4. Type the last four numbers of your Social Security Number (xxxx). Type your Birth Date (mm/dd/yyyy) in the blanks. Click Submit.     5. Create a  coUrbanize Username. Your coUrbanize username cannot be changed. Think of one that is secure and easy to remember. Write it down if you have memory problems. Keep your info in a safe place. 6. Create a  coUrbanize password. You can change your password at any time once you are in your coUrbanize account. 7. Enter your  [place] Question  and  Answer. If you forget your password you can still enter the web site. 8. Enter your E-mail Address. You will receive an e-mail when new information shows up in [address] 9. Click   Sign Up.      Now you can view and download portions of your medical record. 10. Click the   Download Summary   menu link to download a portable copy of your medical information. If the expiration date has passed, you must request a new code. Additional Information  If you have questions, call  [phone]. Remember, coUrbanize is NOT for urgent needs.    For medical emergencies, dial 911. TO BE GOOD HELP®, WE GIVE THE FOLLOWING:    For Healthy Living:    · Do not smoke*. Avoid Tobacco products. Do not go around second-hand smoke. · Eat a healthy diet. Weigh yourself weekly. Exercise regularly. · Your risk of illness goes up with obesity and an inactive lifestyle. Surgeon General's Warning:  Stopping smoking now greatly reduces serious risk to your health. About Heart Failure:    Know your heart history. If you have HEART FAILURE, you may retain fluid. If you:      · Gain weight of 3 pounds (or more) overnight;  · Gain weight of 5 pounds (or more) in a week;  · Swelling in the hands or feet;  · Get short of breath with no effort    · Cannot lay flat in bed    CALL YOUR DOCTOR RIGHT AWAY - DO NOT WAIT FOR YOUR NEXT APPOINTMENT          Signs of a StrokE:    There is a simple memory trick you can use called F.A.S.T.:    F - Face looks uneven  A - Arms unable to move together or evenly  S - Speech does not sound right, or no speech at all  T - Time - CALL 911 as soon as these signs happen - DO NOT WAIT to see if you get better - TIME MEANS MORE BRAIN INJURY. The PATIENT has reviewed the discharge information with the nurse. The PATIENT has asked, and had their questions answered. The PATIENT says they understand these instructions.

## 2017-05-18 ENCOUNTER — TELEPHONE (OUTPATIENT)
Dept: NEUROLOGY | Age: 67
End: 2017-05-18

## 2017-05-18 NOTE — TELEPHONE ENCOUNTER
----- Message from [employer] sent at 5/18/2017  3:13 PM EDT -----  Regarding: Dr. Rj Shoemaker  Pt called regarding status of message from last wk for an appt for a f/up for a cortisone injection and never received a call back. Best contact number [phone].

## 2017-05-24 LAB
CHOLEST SERPL-MCNC: 159 MG/DL (ref 100–199)
HDL SERPL-SCNC: 30.4 UMOL/L
HDLC SERPL-MCNC: 40 MG/DL
INTERPRETATION, 910389: NORMAL
LDL SERPL QN: 20.1 NM
LDL SERPL-SCNC: 1174 NMOL/L
LDL SMALL SERPL-SCNC: 767 NMOL/L
LDLC SERPL CALC-MCNC: 60 MG/DL (ref 0–99)
LP-IR SCORE SERPL: 61
SPECIMEN STATUS REPORT, ROLRST: NORMAL
TRIGL SERPL-MCNC: 296 MG/DL (ref 0–149)

## 2017-05-29 DIAGNOSIS — I25.10 CORONARY ARTERY DISEASE INVOLVING NATIVE HEART WITHOUT ANGINA PECTORIS, UNSPECIFIED VESSEL OR LESION TYPE: ICD-10-CM

## 2017-07-10 ENCOUNTER — TELEPHONE (OUTPATIENT)
Dept: CARDIOLOGY CLINIC | Age: 67
End: 2017-07-10

## 2017-07-10 NOTE — TELEPHONE ENCOUNTER
Call placed to discuss Lipoprofile results with pt per VO of Dr. Joao Rushing. Pt informed of mostly improved results, and elevated TG level. Instructed pt to continue his current medication regimen and Dr. Joao Rushing will monitor TG. Instructions per VO of Dr. Joao Rushing. Opportunities for questions, clarifications, and concerns provided.

## 2017-07-26 ENCOUNTER — OFFICE VISIT (OUTPATIENT)
Dept: NEUROLOGY | Age: 67
End: 2017-07-26

## 2017-07-26 VITALS
HEIGHT: 69 IN | SYSTOLIC BLOOD PRESSURE: 140 MMHG | BODY MASS INDEX: 36.58 KG/M2 | WEIGHT: 247 LBS | HEART RATE: 69 BPM | DIASTOLIC BLOOD PRESSURE: 82 MMHG | OXYGEN SATURATION: 98 %

## 2017-07-26 DIAGNOSIS — M48.062 NEUROGENIC CLAUDICATION DUE TO LUMBAR SPINAL STENOSIS: Primary | ICD-10-CM

## 2017-07-26 RX ORDER — LISINOPRIL 10 MG/1
TABLET ORAL
Qty: 90 TAB | Refills: 0 | Status: SHIPPED | OUTPATIENT
Start: 2017-07-26 | End: 2018-01-26 | Stop reason: SDUPTHER

## 2017-07-26 NOTE — PROGRESS NOTES
Interval HPI:   This is a 79 y.o. male who is following up for     Chief Complaint   Patient presents with    Back Pain     Underwent bilateral L4 transforaminal SHERRY in April 2017. Reports that it gave him significant relief of his back pain and pain radiating down into his legs for a few weeks afterwards. Has started at PT program and says that has significantly helped as well. He feels like he's doing better at this point, doesn't feel that he wants to do any further injections for now. He's aware that he'll need to have back surgery at some point but he's trying to delay that as long as possible, due to the expected recovery time. Brief ROS: as above or otherwise negative  There have been no significant changes in PMHx, PSHx, SHx except as noted above. Allergies   Allergen Reactions    Niaspan [Niacin] Other (comments)     Flushing and itching     Current Outpatient Prescriptions   Medication Sig Dispense Refill    metoprolol succinate (TOPROL-XL) 50 mg XL tablet TAKE 1 TABLET BY MOUTH DAILY 90 Tab 0    lisinopril (PRINIVIL, ZESTRIL) 10 mg tablet TAKE 1 TABLET BY MOUTH EVERY DAY 90 Tab 0    naproxen (NAPROSYN) 250 mg tablet Take 250 mg by mouth two (2) times daily (with meals).  ibuprofen (MOTRIN) 200 mg tablet Take 200 mg by mouth.  rosuvastatin (CRESTOR) 40 mg tablet Take 1 Tab by mouth nightly. 90 Tab 3    Omega-3-DHA-EPA-Fish Oil (FISH OIL) 1,000 mg (120 mg-180 mg) cap Take 1,000 mg by mouth daily. 30 Cap 6    lisinopril (PRINIVIL, ZESTRIL) 10 mg tablet TAKE 1 TABLET BY MOUTH EVERY DAY 90 Tab 0    omega-3 fatty acids-vitamin e (FISH OIL) 1,000 mg cap Take 1 Cap by mouth.  Cholecalciferol, Vitamin D3, (VITAMIN D3) 1,000 unit cap Take  by mouth.  tadalafil (CIALIS) 5 mg tablet Take 5 mg by mouth as needed.  multivitamin (ONE A DAY) tablet Take 1 Tab by mouth daily.  aspirin delayed-release 81 mg tablet Take 81 mg by mouth daily.       lisinopril (PRINIVIL, ZESTRIL) 10 mg tablet TAKE 1 TABLET BY MOUTH EVERY DAY 90 Tab 0       Physical Exam  Blood pressure 140/82, pulse 69, height 5' 9\" (1.753 m), weight 112 kg (247 lb), SpO2 98 %. No acute distress  Neck: no stiffness  Skin: no rashes    Focused Neurological Exam     Mental status: Alert and oriented to person, place situation. Language: normal fluency and comprehension; no dysarthria. CNs:   Visual fields grossly normal  Extraocular movements intact, no nystagmus  Face appears symmetric and facial strength normal.    Hearing is intact to casual conversation. Sensory: intact light touch in all 4 extremities  Motor: Normal bulk and strength in all 4 extremities. Reflexes: not examined  Gait: normal    Impression      ICD-10-CM ICD-9-CM    1. Neurogenic claudication due to lumbar spinal stenosis M48.06 724.03        79 y.o. male with severe spinal stenosis at L4-5, compressing descending nerve roots. Back pain moderately improved with combination of bilateral L4 transforaminal SHERRY and Physical Therapy. Pt to continue doing PT and he will determine if/ when back pain increased to the point he wants to repeat injections or pursue back surgery.   Welcome to follow up if needed

## 2017-07-26 NOTE — MR AVS SNAPSHOT
Visit Information Date & Time Provider Department Dept. Phone Encounter #  
 7/26/2017  2:40 PM [facility] [phone] [id number] Follow-up Instructions Return if symptoms worsen or fail to improve. Upcoming Health Maintenance Date Due DTaP/Tdap/Td series (1 - Tdap) 2/1/1971 ZOSTER VACCINE AGE 60> 12/1/2009 GLAUCOMA SCREENING Q2Y 2/1/2015 Pneumococcal 65+ Low/Medium Risk (1 of 2 - PCV13) 2/1/2015 MEDICARE YEARLY EXAM 2/1/2015 FOBT Q 1 YEAR AGE 50-75 3/4/2016 INFLUENZA AGE 9 TO ADULT 8/1/2017 Allergies as of 7/26/2017  Review Complete On: 7/26/2017 By: Cheryl Costa LPN Severity Noted Reaction Type Reactions Niaspan [Niacin]  01/31/2011   Intolerance Other (comments) Flushing and itching Current Immunizations  Never Reviewed No immunizations on file. Not reviewed this visit You Were Diagnosed With   
  
 Codes Comments Neurogenic claudication due to lumbar spinal stenosis    -  Primary ICD-10-CM: M48.06 
ICD-9-CM: 724.03 Vitals BP Pulse Height(growth percentile) Weight(growth percentile) SpO2 BMI  
 140/82 (BP 1 Location: Left arm, BP Patient Position: Sitting) 69 5' 9\" (1.753 m) 247 lb (112 kg) 98% 36.48 kg/m2 Smoking Status Never Smoker BMI and BSA Data Body Mass Index Body Surface Area  
 36.48 kg/m 2 2.34 m 2 Preferred Pharmacy Pharmacy Name Phone [facility] [address]  [facility] [phone] Your Updated Medication List  
  
   
This list is accurate as of: 7/26/17  3:09 PM.  Always use your most recent med list.  
  
  
  
  
 aspirin delayed-release 81 mg tablet Take 81 mg by mouth daily. CIALIS 5 mg tablet Generic drug:  tadalafil Take 5 mg by mouth as needed. FISH OIL 1,000 mg Cap Generic drug:  omega-3 fatty acids-vitamin e Take 1 Cap by mouth. ibuprofen 200 mg tablet Commonly known as:  MOTRIN Take 200 mg by mouth. * lisinopril 10 mg tablet Commonly known as:  PRINIVIL, ZESTRIL  
TAKE 1 TABLET BY MOUTH EVERY DAY  
  
 * lisinopril 10 mg tablet Commonly known as:  PRINIVIL, ZESTRIL  
TAKE 1 TABLET BY MOUTH EVERY DAY  
  
 metoprolol succinate 50 mg XL tablet Commonly known as:  TOPROL-XL  
TAKE 1 TABLET BY MOUTH DAILY  
  
 multivitamin tablet Commonly known as:  ONE A DAY Take 1 Tab by mouth daily. naproxen 250 mg tablet Commonly known as:  NAPROSYN Take 250 mg by mouth two (2) times daily (with meals). Omega-3-DHA-EPA-Fish Oil 1,000 mg (120 mg-180 mg) Cap Commonly known as:  FISH OIL Take 1,000 mg by mouth daily. rosuvastatin 40 mg tablet Commonly known as:  CRESTOR Take 1 Tab by mouth nightly. VITAMIN D3 1,000 unit Cap Generic drug:  cholecalciferol Take  by mouth. * Notice: This list has 2 medication(s) that are the same as other medications prescribed for you. Read the directions carefully, and ask your doctor or other care provider to review them with you. Follow-up Instructions Return if symptoms worsen or fail to improve. Introducing [facility]! Dear Yanet Saxena: 
Thank you for requesting a Mobile Shareholder account. Our records indicate that you already have an active Mobile Shareholder account. You can access your account anytime at [email or web address] Did you know that you can access your hospital and ER discharge instructions at any time in Mobile Shareholder? You can also review all of your test results from your hospital stay or ER visit. Additional Information If you have questions, please visit the Frequently Asked Questions section of the Mobile Shareholder website at [email or web address] Remember, Mobile Shareholder is NOT to be used for urgent needs.  For medical emergencies, dial 911. Now available from your iPhone and Android! Please provide this summary of care documentation to your next provider. Your primary care clinician is listed as Thomas Ramos. If you have any questions after today's visit, please call [phone].

## 2017-08-25 RX ORDER — METOPROLOL SUCCINATE 50 MG/1
TABLET, EXTENDED RELEASE ORAL
Qty: 90 TAB | Refills: 0 | Status: SHIPPED | OUTPATIENT
Start: 2017-08-25 | End: 2017-11-21 | Stop reason: SDUPTHER

## 2017-11-09 RX ORDER — LISINOPRIL 10 MG/1
TABLET ORAL
Qty: 90 TAB | Refills: 0 | Status: SHIPPED | OUTPATIENT
Start: 2017-11-09 | End: 2018-01-26 | Stop reason: SDUPTHER

## 2017-11-27 RX ORDER — METOPROLOL SUCCINATE 50 MG/1
TABLET, EXTENDED RELEASE ORAL
Qty: 90 TAB | Refills: 0 | Status: SHIPPED | OUTPATIENT
Start: 2017-11-27 | End: 2018-01-26 | Stop reason: SDUPTHER

## 2018-01-26 ENCOUNTER — OFFICE VISIT (OUTPATIENT)
Dept: CARDIOLOGY CLINIC | Age: 68
End: 2018-01-26

## 2018-01-26 VITALS
HEIGHT: 69 IN | OXYGEN SATURATION: 95 % | SYSTOLIC BLOOD PRESSURE: 140 MMHG | DIASTOLIC BLOOD PRESSURE: 78 MMHG | WEIGHT: 242 LBS | BODY MASS INDEX: 35.84 KG/M2 | HEART RATE: 76 BPM | RESPIRATION RATE: 16 BRPM

## 2018-01-26 DIAGNOSIS — I25.10 CORONARY ARTERY DISEASE INVOLVING NATIVE CORONARY ARTERY OF NATIVE HEART WITHOUT ANGINA PECTORIS: ICD-10-CM

## 2018-01-26 DIAGNOSIS — I10 ESSENTIAL HYPERTENSION: Primary | ICD-10-CM

## 2018-01-26 DIAGNOSIS — E78.2 MIXED HYPERLIPIDEMIA: ICD-10-CM

## 2018-01-26 RX ORDER — LISINOPRIL 10 MG/1
TABLET ORAL
Qty: 90 TAB | Refills: 0 | Status: SHIPPED | OUTPATIENT
Start: 2018-01-26 | End: 2018-04-28 | Stop reason: SDUPTHER

## 2018-01-26 RX ORDER — METOPROLOL SUCCINATE 50 MG/1
TABLET, EXTENDED RELEASE ORAL
Qty: 90 TAB | Refills: 0 | Status: SHIPPED | OUTPATIENT
Start: 2018-01-26 | End: 2018-11-13 | Stop reason: SDUPTHER

## 2018-01-26 NOTE — PROGRESS NOTES
Chief Complaint   Patient presents with    Follow-up     CAD, Htn, Hyperlipidemia, Fatigue     1. Have you been to the ER, urgent care clinic since your last visit? Hospitalized since your last visit? Yes. [facility] for Lumbar epidural steroid injection. 2. Have you seen or consulted any other health care providers outside of the [address] [place] [address] since your last visit? Include any pap smears or colon screening.  No    Visit Vitals    /78 (BP 1 Location: Right arm, BP Patient Position: Sitting)    Pulse 76    Resp 16    Ht 5' 9\" (1.753 m)    Wt 242 lb (109.8 kg)    SpO2 95%    BMI 35.74 kg/m2

## 2018-01-26 NOTE — MR AVS SNAPSHOT
[address] [facility] 600 1007 [facility] 
[phone] Patient: Anirudh Flores MRN: [id number] VEZ:9/1/[id number] Visit Information Date & Time Provider Department Dept. Phone Encounter #  
 1/26/2018  2:20 PM Gwen Call MD [facility] Leonor Dunne [phone] [id number] Your Appointments 8/2/2018  9:20 AM  
ESTABLISHED PATIENT with Gwen Call MD  
[facility] ([facility]) Appt Note: 6 mo fu  
 320 [facility] 600 1007 [facility]  
[address] [place] [address] [address] Upcoming Health Maintenance Date Due DTaP/Tdap/Td series (1 - Tdap) 2/1/1971 ZOSTER VACCINE AGE 60> 12/1/2009 GLAUCOMA SCREENING Q2Y 2/1/2015 Pneumococcal 65+ Low/Medium Risk (1 of 2 - PCV13) 2/1/2015 MEDICARE YEARLY EXAM 2/1/2015 FOBT Q 1 YEAR AGE 50-75 3/4/2016 Influenza Age 5 to Adult 8/1/2017 Allergies as of 1/26/2018  Review Complete On: 1/26/2018 By: Gwen Call MD  
  
 Severity Noted Reaction Type Reactions Niaspan [Niacin]  01/31/2011   Intolerance Other (comments) Flushing and itching Current Immunizations  Never Reviewed No immunizations on file. Not reviewed this visit You Were Diagnosed With   
  
 Codes Comments Essential hypertension    -  Primary ICD-10-CM: I10 
ICD-9-CM: 401.9 Coronary artery disease involving native coronary artery of native heart without angina pectoris     ICD-10-CM: I25.10 ICD-9-CM: 414.01 Vitals BP Pulse Resp Height(growth percentile) Weight(growth percentile) SpO2  
 140/78 (BP 1 Location: Right arm, BP Patient Position: Sitting) 76 16 5' 9\" (1.753 m) 242 lb (109.8 kg) 95% BMI Smoking Status 35.74 kg/m2 Never Smoker Vitals History BMI and BSA Data Body Mass Index Body Surface Area 35.74 kg/m 2 2.31 m 2 Preferred Pharmacy Pharmacy Name Phone [facility] [address]  [facility] [phone] Your Updated Medication List  
  
   
This list is accurate as of: 1/26/18  3:34 PM.  Always use your most recent med list.  
  
  
  
  
 aspirin delayed-release 81 mg tablet Take 81 mg by mouth daily. CIALIS 5 mg tablet Generic drug:  tadalafil Take 5 mg by mouth as needed. FISH OIL 1,000 mg Cap Generic drug:  omega-3 fatty acids-vitamin e Take 1 Cap by mouth. ibuprofen 200 mg tablet Commonly known as:  MOTRIN Take 200 mg by mouth.  
  
 lisinopril 10 mg tablet Commonly known as:  PRINIVIL, ZESTRIL  
TAKE 1 TABLET BY MOUTH EVERY DAY  
  
 metoprolol succinate 50 mg XL tablet Commonly known as:  TOPROL-XL  
TAKE 1 TABLET BY MOUTH DAILY  
  
 multivitamin tablet Commonly known as:  ONE A DAY Take 1 Tab by mouth daily. naproxen 250 mg tablet Commonly known as:  NAPROSYN Take 250 mg by mouth two (2) times daily (with meals). Omega-3-DHA-EPA-Fish Oil 1,000 mg (120 mg-180 mg) Cap Commonly known as:  FISH OIL Take 1,000 mg by mouth daily. rosuvastatin 40 mg tablet Commonly known as:  CRESTOR Take 1 Tab by mouth nightly. VIAGRA PO Take 20 mg by mouth. VITAMIN D3 1,000 unit Cap Generic drug:  cholecalciferol Take  by mouth. Introducing [facility]! Dear Yunier Kennedy: 
Thank you for requesting a Marbles[employer] account. Our records indicate that you already have an active [employer] Store account. You can access your account anytime at [email or web address] Did you know that you can access your hospital and ER discharge instructions at any time in Marbles[employer] The Brain Store? You can also review all of your test results from your hospital stay or ER visit. Additional Information If you have questions, please visit the Frequently Asked Questions section of the [employer] website at [email or web address] Remember, [employer] is NOT to be used for urgent needs. For medical emergencies, dial 911. Now available from your iPhone and Android! Please provide this summary of care documentation to your next provider. Your primary care clinician is listed as Dinora Conn. If you have any questions after today's visit, please call [phone].

## 2018-01-26 NOTE — PROGRESS NOTES
Office Follow-up    NAME: Nash Amaral   :  1950  MRM:  [id number]    Date:  2018            Assessment:     Problem List  Date Reviewed: 2018          Codes Class Noted    Neurogenic claudication due to lumbar spinal stenosis ICD-10-CM: M48.062  ICD-9-CM: 724.03  2017        Prediabetes (Chronic) ICD-10-CM: R73.03  ICD-9-CM: 790.29  10/4/2011        HTN (hypertension) ICD-10-CM: I10  ICD-9-CM: 401.9  10/4/2011        Mixed hyperlipidemia ICD-10-CM: O20.9  ICD-9-CM: 272.2  2011    Overview Addendum 2015  9:53 AM by Maicol Smith MD     Lipids 10/5/10 -   HDL 39  LDLp- 2157, LDL-C 102   Lipids 10/5/10 -   HDL 34   Lipids 10/16/10 -  LDL 31 HDL 33 ,              CAD (coronary artery disease) (Chronic) ICD-10-CM: I25.10  ICD-9-CM: 414.00  2011    Overview Addendum 2011  8:33 AM by LESLEY Tao     Inferior MI 2004  CATH:  2004- 2v CAD. LAD prox 40%, OM 80-90%, %  - ------PCI RCA with  stent  --------Staged PCI OM Aug 2004  AP 2005  CATH: 2005- new prox circ subtotal occlusion s/p PCI x 2 stents (Cypher), patent OM and RCA stents, EF 45% Marvel Huddleston @ [facility])  CATH: 11: LVEDP 23, LVG EF 50%, moderate inferior HK, LM normal, LAD calcified prox 60%, circumflex prox stent patent, OM 1 prox stent patent, RCA long prox/mid stent with 99% in-stent restenosis, T2 flow. Distal short stent probably patent but slow filling hampers assessment  ------PCI of RCA with 3.0 x 18 mm Xience (mid) and 2.75 x 15 Xience (distal)                      Plan:     1. Fatigue with shortness of breath: Unclear if the symptoms are CAD related. His most recent stress test last year was negative for significant ischemia other than mild infarct and mild vesna-infarct ischemia in the RCA territory. He has residual 60% LAD lesion which was left alone. Years ago.   I asked him to keep an eye on his symptoms and if he continues to have progressive symptoms we may have to proceed with cardiac catheterization to review his LAD lesion. He voiced understanding these instructions. Continue current medications. 2. Hypertension: Blood pressure is controlled. Continue current medications. 3. Dyslipidemia: Continue Crestor. Recent labs are not available. 4. Sleep apnea: Continue CPAP. 5. Follow-up with me in 6 months but call us if he continues to have significant fatigue and shortness of breath. Subjective:     Carren Cockayne, a 79y.o. year-old who presents for followup. He has known history of coronary artery disease with prior stent and right coronary artery. He has a residual LAD lesion. He is doing overall okay however in the past few weeks to months he has felt decreased energy and fatigue. He think he can do most activities however is not as easy as it used to be. He denies any classic anginal symptoms. However even in the past when he had a stent placed he never had classic anginal symptoms. His last stress test was in 2017 which demonstrated a small inferior infarct with mild vesna-infarct ischemia. The LAD territory showed normal perfusion.     Exam:     Physical Exam:  Visit Vitals    /78 (BP 1 Location: Right arm, BP Patient Position: Sitting)    Pulse 76    Resp 16    Ht 5' 9\" (1.753 m)    Wt 242 lb (109.8 kg)    SpO2 95%    BMI 35.74 kg/m2     General appearance - alert, well appearing, and in no distress  Mental status - affect appropriate to mood  Eyes - sclera anicteric, moist mucous membranes  Neck - supple, no significant adenopathy  Chest - clear to auscultation, no wheezes, rales or rhonchi  Heart - normal rate, regular rhythm, normal S1, S2, no murmurs, rubs, clicks or gallops  Abdomen - soft, nontender, nondistended, no masses or organomegaly  Extremities - peripheral pulses normal, no pedal edema  Skin - normal coloration  no rashes    Medications:     Current Outpatient Prescriptions   Medication Sig    SILDENAFIL CITRATE (VIAGRA PO) Take 20 mg by mouth.  metoprolol succinate (TOPROL-XL) 50 mg XL tablet TAKE 1 TABLET BY MOUTH DAILY    rosuvastatin (CRESTOR) 40 mg tablet Take 1 Tab by mouth nightly.  Omega-3-DHA-EPA-Fish Oil (FISH OIL) 1,000 mg (120 mg-180 mg) cap Take 1,000 mg by mouth daily.  lisinopril (PRINIVIL, ZESTRIL) 10 mg tablet TAKE 1 TABLET BY MOUTH EVERY DAY    omega-3 fatty acids-vitamin e (FISH OIL) 1,000 mg cap Take 1 Cap by mouth.  Cholecalciferol, Vitamin D3, (VITAMIN D3) 1,000 unit cap Take  by mouth.  tadalafil (CIALIS) 5 mg tablet Take 5 mg by mouth as needed.  multivitamin (ONE A DAY) tablet Take 1 Tab by mouth daily.  aspirin delayed-release 81 mg tablet Take 81 mg by mouth daily.  naproxen (NAPROSYN) 250 mg tablet Take 250 mg by mouth two (2) times daily (with meals).  ibuprofen (MOTRIN) 200 mg tablet Take 200 mg by mouth. No current facility-administered medications for this visit. Diagnostic Data Review:       4/3/17 Spect lexiscan: LVEF 55%; small area of mod intensity basal to mid inferior wall infarct with mild vesna-infarct ischemia. Stress test cardiolite:5/4/11- basal and mid inferior ischemia, EF 56%    Cardiac catheterization:9/29/2011- LVEDP 23, LVG EF 50%, moderate inferior HK, LM normal, LAD calcified prox 60%, circumflex prox stent patent, OM 1 prox stent patent, RCA long prox/mid stent with 99% in-stent restenosis, T2 flow.  Distal short stent probably patent but slow filling hampers assessment   Stent insertion:9/29/11- PCI of RCA with 3.0 x 18 mm Xience (mid) and 2.75 x 15 Xience (distal)      Lab Review:     Lab Results   Component Value Date/Time    Cholesterol, total 163 09/30/2011 06:10 AM    Cholesterol, Total 159 05/22/2017 12:00 AM    HDL Cholesterol 43 09/30/2011 06:10 AM    LDL,Direct 45 10/05/2010 08:23 AM    LDL, calculated 84.2 09/30/2011 06:10 AM    Triglyceride 179 09/30/2011 06:10 AM CHOL/HDL Ratio 3.8 09/30/2011 06:10 AM     Lab Results   Component Value Date/Time    Creatinine 1.09 11/21/2013 05:30 PM     Lab Results   Component Value Date/Time    BUN 21 11/21/2013 05:30 PM     Lab Results   Component Value Date/Time    Potassium 4.7 11/21/2013 05:30 PM     Lab Results   Component Value Date/Time    Hemoglobin A1c 6.3 10/16/2010 06:37 AM    Hemoglobin A1c, External 6.2 02/21/2015     Lab Results   Component Value Date/Time    HGB 14.5 11/21/2013 05:30 PM     Lab Results   Component Value Date/Time    PLATELET 709 93/99/1420 05:30 PM     No results for input(s): CPK, CKMB, TROIQ in the last 72 hours. No lab exists for component: CKQMB, CPKMB             ___________________________________________________    Ellie Robb.  Betsey Deleon MD, [facility] [place]

## 2018-04-30 RX ORDER — METOPROLOL SUCCINATE 50 MG/1
TABLET, EXTENDED RELEASE ORAL
Qty: 90 TAB | Refills: 0 | Status: SHIPPED | OUTPATIENT
Start: 2018-04-30 | End: 2018-08-03 | Stop reason: SDUPTHER

## 2018-04-30 RX ORDER — LISINOPRIL 10 MG/1
TABLET ORAL
Qty: 90 TAB | Refills: 0 | Status: SHIPPED | OUTPATIENT
Start: 2018-04-30 | End: 2018-08-03 | Stop reason: SDUPTHER

## 2018-05-06 RX ORDER — ERGOCALCIFEROL 1.25 MG/1
CAPSULE ORAL
Qty: 36 CAP | Refills: 0 | Status: SHIPPED | OUTPATIENT
Start: 2018-05-06

## 2018-05-06 RX ORDER — ROSUVASTATIN CALCIUM 40 MG/1
TABLET, COATED ORAL
Qty: 90 TAB | Refills: 0 | Status: SHIPPED | OUTPATIENT
Start: 2018-05-06 | End: 2018-09-20 | Stop reason: SDUPTHER

## 2018-08-03 RX ORDER — METOPROLOL SUCCINATE 50 MG/1
TABLET, EXTENDED RELEASE ORAL
Qty: 90 TAB | Refills: 0 | Status: SHIPPED | OUTPATIENT
Start: 2018-08-03 | End: 2018-09-17 | Stop reason: SDUPTHER

## 2018-08-03 RX ORDER — LISINOPRIL 10 MG/1
TABLET ORAL
Qty: 90 TAB | Refills: 0 | Status: SHIPPED | OUTPATIENT
Start: 2018-08-03 | End: 2018-11-13 | Stop reason: SDUPTHER

## 2018-09-17 ENCOUNTER — OFFICE VISIT (OUTPATIENT)
Dept: CARDIOLOGY CLINIC | Age: 68
End: 2018-09-17

## 2018-09-17 VITALS
BODY MASS INDEX: 34.21 KG/M2 | SYSTOLIC BLOOD PRESSURE: 134 MMHG | DIASTOLIC BLOOD PRESSURE: 64 MMHG | HEART RATE: 61 BPM | HEIGHT: 69 IN | WEIGHT: 231 LBS

## 2018-09-17 DIAGNOSIS — I25.10 CORONARY ARTERY DISEASE INVOLVING NATIVE CORONARY ARTERY OF NATIVE HEART WITHOUT ANGINA PECTORIS: Primary | Chronic | ICD-10-CM

## 2018-09-17 DIAGNOSIS — I10 ESSENTIAL HYPERTENSION: ICD-10-CM

## 2018-09-17 DIAGNOSIS — E78.2 MIXED HYPERLIPIDEMIA: ICD-10-CM

## 2018-09-17 RX ORDER — METFORMIN HYDROCHLORIDE 500 MG/1
TABLET, EXTENDED RELEASE ORAL
COMMUNITY
End: 2021-03-08 | Stop reason: ALTCHOICE

## 2018-09-17 NOTE — PROGRESS NOTES
Office Follow-up    NAME: Pari Figueroa   :  1950  MRM:  [id number]    Date:  2018            Assessment:     Problem List  Date Reviewed: 2018          Codes Class Noted    Neurogenic claudication due to lumbar spinal stenosis ICD-10-CM: M48.062  ICD-9-CM: 724.03  2017        Prediabetes (Chronic) ICD-10-CM: R73.03  ICD-9-CM: 790.29  10/4/2011        HTN (hypertension) ICD-10-CM: I10  ICD-9-CM: 401.9  10/4/2011        Mixed hyperlipidemia ICD-10-CM: M61.2  ICD-9-CM: 272.2  2011    Overview Addendum 2015  9:53 AM by Carissa Leon MD     Lipids 10/5/10 -   HDL 39  LDLp- 2157, LDL-C 102   Lipids 10/5/10 -   HDL 34   Lipids 10/16/10 -  LDL 31 HDL 33 ,              CAD (coronary artery disease) (Chronic) ICD-10-CM: I25.10  ICD-9-CM: 414.00  2011    Overview Addendum 2011  8:33 AM by LESLEY Guzmán     Inferior MI 2004  CATH:  2004- 2v CAD. LAD prox 40%, OM 80-90%, %  - ------PCI RCA with  stent  --------Staged PCI OM Aug 2004  AP 2005  CATH: 2005- new prox circ subtotal occlusion s/p PCI x 2 stents (Cypher), patent OM and RCA stents, EF 45% Raylejairo Eric @ [facility])  CATH: 11: LVEDP 23, LVG EF 50%, moderate inferior HK, LM normal, LAD calcified prox 60%, circumflex prox stent patent, OM 1 prox stent patent, RCA long prox/mid stent with 99% in-stent restenosis, T2 flow. Distal short stent probably patent but slow filling hampers assessment  ------PCI of RCA with 3.0 x 18 mm Xience (mid) and 2.75 x 15 Xience (distal)                      Plan:     1. CAD: This is stable. Continue current medications. There is a dual LAD 60% lesion is stable without any ischemia on stress test last noted in 2017  2. Fatigue and shortness of breath has improved ever since he has been on metformin and has regular his blood sugar. 3. Hypertension: Blood pressure is controlled. Continue Toprol-XL. 4. Dyslipidemia: Continue Crestor. FLP is a follow-up primary care physician. 5. Sleep apnea: Continue CPAP. 6. See me again in 1 year. Subjective:     Stevo Yu, a 76y.o. year-old who presents for followup. He has known history of CAD with prior stent. He has a residual 60% LAD lesion. Stress test back in 2017 was negative for ischemia. He is returning today for follow-up. On previous visit he had fatigue and shortness of breath which have improved and were likely because he was newly diagnosed with diabetes right after his previous presentation. Since the diagnosis of type 2 diabetes he has been very careful with his diet and exercise. He has lost weight and he has been controlling his blood sugars with metformin. He has been very active goes to gym on regular basis. EKG in my office today demonstrated normal sinus rhythm, normal axis, normal intervals, old inferior infarct. Exam:     Physical Exam:  Visit Vitals    /64    Pulse 61    Ht 5' 9\" (1.753 m)    Wt 231 lb (104.8 kg)    BMI 34.11 kg/m2     General appearance - alert, well appearing, and in no distress  Mental status - affect appropriate to mood  Eyes - sclera anicteric, moist mucous membranes  Neck - supple, no significant adenopathy  Chest - clear to auscultation, no wheezes, rales or rhonchi  Heart - normal rate, regular rhythm, normal S1, S2, no murmurs, rubs, clicks or gallops  Abdomen - soft, nontender, nondistended, no masses or organomegaly  Extremities - peripheral pulses normal, no pedal edema  Skin - normal coloration  no rashes    Medications:     Current Outpatient Prescriptions   Medication Sig    metFORMIN ER (GLUCOPHAGE XR) 500 mg tablet Take  by mouth daily (with dinner).     lisinopril (PRINIVIL, ZESTRIL) 10 mg tablet TAKE 1 TABLET BY MOUTH EVERY DAY    ergocalciferol (ERGOCALCIFEROL) 50,000 unit capsule TAKE 1 CAPSULE BY MOUTH 3 TIMES A WEEK    rosuvastatin (CRESTOR) 40 mg tablet TAKE 1 TABLET BY MOUTH EVERY NIGHT    SILDENAFIL CITRATE (VIAGRA PO) Take 20 mg by mouth.  metoprolol succinate (TOPROL-XL) 50 mg XL tablet TAKE 1 TABLET BY MOUTH DAILY    ibuprofen (MOTRIN) 200 mg tablet Take 200 mg by mouth.  Omega-3-DHA-EPA-Fish Oil (FISH OIL) 1,000 mg (120 mg-180 mg) cap Take 1,000 mg by mouth daily.  Cholecalciferol, Vitamin D3, (VITAMIN D3) 1,000 unit cap Take  by mouth.  tadalafil (CIALIS) 5 mg tablet Take 5 mg by mouth as needed.  multivitamin (ONE A DAY) tablet Take 1 Tab by mouth daily.  aspirin delayed-release 81 mg tablet Take 81 mg by mouth daily.  naproxen (NAPROSYN) 250 mg tablet Take 250 mg by mouth as needed. No current facility-administered medications for this visit. Diagnostic Data Review:       4/3/17 Spect lexiscan: LVEF 55%; small area of mod intensity basal to mid inferior wall infarct with mild vesna-infarct ischemia. Stress test cardiolite:5/4/11- basal and mid inferior ischemia, EF 56%    Cardiac catheterization:9/29/2011- LVEDP 23, LVG EF 50%, moderate inferior HK, LM normal, LAD calcified prox 60%, circumflex prox stent patent, OM 1 prox stent patent, RCA long prox/mid stent with 99% in-stent restenosis, T2 flow.  Distal short stent probably patent but slow filling hampers assessment   Stent insertion:9/29/11- PCI of RCA with 3.0 x 18 mm Xience (mid) and 2.75 x 15 Xience (distal)      Lab Review:     Lab Results   Component Value Date/Time    Cholesterol, total 163 09/30/2011 06:10 AM    Cholesterol, Total 159 05/22/2017 12:00 AM    HDL Cholesterol 43 09/30/2011 06:10 AM    LDL,Direct 45 10/05/2010 08:23 AM    LDL, calculated 84.2 09/30/2011 06:10 AM    Triglyceride 179 (H) 09/30/2011 06:10 AM    CHOL/HDL Ratio 3.8 09/30/2011 06:10 AM     Lab Results   Component Value Date/Time    Creatinine 1.09 11/21/2013 05:30 PM     Lab Results   Component Value Date/Time    BUN 21 (H) 11/21/2013 05:30 PM     Lab Results   Component Value Date/Time    Potassium 4.7 11/21/2013 05:30 PM     Lab Results   Component Value Date/Time    Hemoglobin A1c 6.3 (H) 10/16/2010 06:37 AM    Hemoglobin A1c, External 6.2 02/21/2015     Lab Results   Component Value Date/Time    HGB 14.5 11/21/2013 05:30 PM     Lab Results   Component Value Date/Time    PLATELET 014 73/00/2478 05:30 PM     No results for input(s): CPK, CKMB, TROIQ in the last 72 hours. No lab exists for component: CKQMB, CPKMB             ___________________________________________________    Clinton Joy.  Natasha Burnham MD, [facility] [place]

## 2018-09-17 NOTE — MR AVS SNAPSHOT
[address] [facility] [place] [address] 
[phone] Patient: Shayan Bee MRN: [id number] EPS:[birth year] Visit Information Date & Time Provider Department Dept. Phone Encounter #  
 9/17/2018  2:00 PM Jessica Valverde MD [facility] [place]  [phone] [id number] Follow-up Instructions Follow-up and Disposition History Your Appointments 9/26/2019  9:20 AM  
ESTABLISHED PATIENT with Jessica Valverde MD  
[facility] ([facility] Appt Note: annual  
 320 [facility] 600 [address]  
54 [facility] [address] [address] Upcoming Health Maintenance Date Due DTaP/Tdap/Td series (1 - Tdap) 2/1/1971 ZOSTER VACCINE AGE 60> 12/1/2009 GLAUCOMA SCREENING Q2Y 2/1/2015 Pneumococcal 65+ Low/Medium Risk (1 of 2 - PCV13) 2/1/2015 FOBT Q 1 YEAR AGE 50-75 3/4/2016 MEDICARE YEARLY EXAM 3/14/2018 Influenza Age 5 to Adult 8/1/2018 Allergies as of 9/17/2018  Review Complete On: 9/17/2018 By: Jessica Valverde MD  
  
 Severity Noted Reaction Type Reactions Niaspan [Niacin]  01/31/2011   Intolerance Other (comments) Flushing and itching Current Immunizations  Never Reviewed No immunizations on file. Not reviewed this visit You Were Diagnosed With   
  
 Codes Comments Coronary artery disease involving native coronary artery of native heart without angina pectoris    -  Primary ICD-10-CM: I25.10 ICD-9-CM: 414.01 Mixed hyperlipidemia     ICD-10-CM: E78.2 ICD-9-CM: 272.2 Essential hypertension     ICD-10-CM: I10 
ICD-9-CM: 401.9 Vitals BP Pulse Height(growth percentile) Weight(growth percentile) BMI Smoking Status 134/64 61 5' 9\" (1.753 m) 231 lb (104.8 kg) 34.11 kg/m2 Never Smoker Vitals History BMI and BSA Data Body Mass Index Body Surface Area  
 34.11 kg/m 2 2.26 m 2 Preferred Pharmacy [facility] [address]  [facility] [phone] Your Updated Medication List  
  
   
This list is accurate as of 9/17/18  2:49 PM.  Always use your most recent med list.  
  
  
  
  
 aspirin delayed-release 81 mg tablet Take 81 mg by mouth daily. CIALIS 5 mg tablet Generic drug:  tadalafil Take 5 mg by mouth as needed. ergocalciferol 50,000 unit capsule Commonly known as:  ERGOCALCIFEROL  
TAKE 1 CAPSULE BY MOUTH 3 TIMES A WEEK  
  
 ibuprofen 200 mg tablet Commonly known as:  MOTRIN Take 200 mg by mouth.  
  
 lisinopril 10 mg tablet Commonly known as:  PRINIVIL, ZESTRIL  
TAKE 1 TABLET BY MOUTH EVERY DAY  
  
 metFORMIN  mg tablet Commonly known as:  GLUCOPHAGE XR Take  by mouth daily (with dinner). metoprolol succinate 50 mg XL tablet Commonly known as:  TOPROL-XL  
TAKE 1 TABLET BY MOUTH DAILY  
  
 multivitamin tablet Commonly known as:  ONE A DAY Take 1 Tab by mouth daily. naproxen 250 mg tablet Commonly known as:  NAPROSYN Take 250 mg by mouth as needed. omega 3-DHA-EPA-fish oil 1,000 mg (120 mg-180 mg) capsule Commonly known as:  FISH OIL Take 1,000 mg by mouth daily. rosuvastatin 40 mg tablet Commonly known as:  CRESTOR  
TAKE 1 TABLET BY MOUTH EVERY NIGHT  
  
 VIAGRA PO Take 20 mg by mouth. VITAMIN D3 1,000 unit Cap Generic drug:  cholecalciferol Take  by mouth. We Performed the Following AMB POC EKG ROUTINE W/ 12 LEADS, INTER & REP [22076 CPT(R)] Patient Instructions See Dr. Luba Knutson in 1 year. Introducing [facility]! Dear Maureen Horan: 
Thank you for requesting a Spling account. Our records indicate that you already have an active Spling account.   You can access your account anytime at [email or web address] Did you know that you can access your hospital and ER discharge instructions at any time in Implisit? You can also review all of your test results from your hospital stay or ER visit. Additional Information If you have questions, please visit the Frequently Asked Questions section of the Im[employer]is[employer] website at [email or web address] Remember, Im[employer]is[employer] is NOT to be used for urgent needs. For medical emergencies, dial 911. Now available from your iPhone and Android! Please provide this summary of care documentation to your next provider. Your primary care clinician is listed as Belinda Lira. If you have any questions after today's visit, please call [phone].

## 2018-09-21 RX ORDER — ROSUVASTATIN CALCIUM 40 MG/1
TABLET, COATED ORAL
Qty: 90 TAB | Refills: 0 | Status: SHIPPED | OUTPATIENT
Start: 2018-09-21 | End: 2018-12-29 | Stop reason: SDUPTHER

## 2018-11-13 RX ORDER — METOPROLOL SUCCINATE 50 MG/1
TABLET, EXTENDED RELEASE ORAL
Qty: 90 TAB | Refills: 2 | Status: SHIPPED | OUTPATIENT
Start: 2018-11-13 | End: 2019-12-09 | Stop reason: SDUPTHER

## 2018-11-13 RX ORDER — LISINOPRIL 10 MG/1
10 TABLET ORAL DAILY
Qty: 90 TAB | Refills: 2 | Status: SHIPPED | OUTPATIENT
Start: 2018-11-13 | End: 2021-08-10 | Stop reason: SDUPTHER

## 2018-11-13 NOTE — TELEPHONE ENCOUNTER
Refill of lisinopril 10mg daily and metoprolol succinate 50mg daily completed per VO of Dr. Charolet Hamman.     Last OV: 9/17/18  Next OV: 9/2019

## 2018-12-31 RX ORDER — ROSUVASTATIN CALCIUM 40 MG/1
TABLET, COATED ORAL
Qty: 90 TAB | Refills: 0 | Status: SHIPPED | OUTPATIENT
Start: 2018-12-31 | End: 2019-12-09

## 2019-07-24 NOTE — PERIOP NOTES
[address]  Endoscopy Preprocedure Instructions      1. On the day of your surgery, please report to registration located on the 2nd floor of the  [facility]. yes    2. You must have a responsible adult to drive you to the hospital, stay at the hospital during your procedure and drive you home. If they leave your procedure will not be started (no exceptions). yes    3. Do not have anything to eat or drink (including water, gum, mints, coffee, and juice) after midnight. This does not apply to the medications you were instructed to take by your physician. yesIf you are currently taking Plavix, Coumadin, Aspirin, or other blood-thinning agents, contact your physician for special instructions. yes,    4. If you are having a procedure that requires bowel prep: We recommend that you have only clear liquids the day before your procedure and begin your bowel prep by 5:00 pm.  You may continue to drink clear liquids until midnight. If for any reason you are not able to complete your prep please notify your physician immediately. yes    5. Have a list of all current medications, including vitamins, herbal supplements and any other over the counter medications. yes    6. If you wear glasses, contacts, dentures and/or hearing aids, they may be removed prior to procedure, please bring a case to store them in. yes    7. You should understand that if you do not follow these instructions your procedure may be cancelled. If your physical condition changes (I.e. fever, cold or flu) please contact your doctor as soon as possible. 8. It is important that you be on time.   If for any reason you are unable to keep your appointment please call )- the day of or your physicians office prior to your scheduled procedure

## 2019-07-25 ENCOUNTER — ANESTHESIA (OUTPATIENT)
Dept: ENDOSCOPY | Age: 69
End: 2019-07-25
Payer: MEDICARE

## 2019-07-25 ENCOUNTER — HOSPITAL ENCOUNTER (OUTPATIENT)
Age: 69
Setting detail: OUTPATIENT SURGERY
Discharge: HOME | End: 2019-07-25
Attending: INTERNAL MEDICINE | Admitting: INTERNAL MEDICINE
Payer: MEDICARE

## 2019-07-25 ENCOUNTER — ANESTHESIA EVENT (OUTPATIENT)
Dept: ENDOSCOPY | Age: 69
End: 2019-07-25
Payer: MEDICARE

## 2019-07-25 VITALS
TEMPERATURE: 97.9 F | RESPIRATION RATE: 12 BRPM | HEIGHT: 69 IN | HEART RATE: 61 BPM | DIASTOLIC BLOOD PRESSURE: 63 MMHG | BODY MASS INDEX: 35.17 KG/M2 | SYSTOLIC BLOOD PRESSURE: 145 MMHG | OXYGEN SATURATION: 99 % | WEIGHT: 237.44 LBS

## 2019-07-25 LAB
GLUCOSE BLD STRIP.AUTO-MCNC: 131 MG/DL (ref 65–100)
SERVICE CMNT-IMP: ABNORMAL

## 2019-07-25 PROCEDURE — 82962 GLUCOSE BLOOD TEST: CPT

## 2019-07-25 PROCEDURE — 74011250636 HC RX REV CODE- 250/636: Performed by: INTERNAL MEDICINE

## 2019-07-25 PROCEDURE — 76060000031 HC ANESTHESIA FIRST 0.5 HR: Performed by: INTERNAL MEDICINE

## 2019-07-25 PROCEDURE — 74011250636 HC RX REV CODE- 250/636

## 2019-07-25 PROCEDURE — 74011250637 HC RX REV CODE- 250/637: Performed by: INTERNAL MEDICINE

## 2019-07-25 PROCEDURE — 76040000019: Performed by: INTERNAL MEDICINE

## 2019-07-25 RX ORDER — EPINEPHRINE 0.1 MG/ML
1 INJECTION INTRACARDIAC; INTRAVENOUS
Status: DISCONTINUED | OUTPATIENT
Start: 2019-07-25 | End: 2019-07-25 | Stop reason: HOSPADM

## 2019-07-25 RX ORDER — PROPOFOL 10 MG/ML
INJECTION, EMULSION INTRAVENOUS
Status: DISCONTINUED | OUTPATIENT
Start: 2019-07-25 | End: 2019-07-25 | Stop reason: HOSPADM

## 2019-07-25 RX ORDER — NALOXONE HYDROCHLORIDE 0.4 MG/ML
0.4 INJECTION, SOLUTION INTRAMUSCULAR; INTRAVENOUS; SUBCUTANEOUS
Status: DISCONTINUED | OUTPATIENT
Start: 2019-07-25 | End: 2019-07-25 | Stop reason: HOSPADM

## 2019-07-25 RX ORDER — ATROPINE SULFATE 0.1 MG/ML
0.4 INJECTION INTRAVENOUS
Status: DISCONTINUED | OUTPATIENT
Start: 2019-07-25 | End: 2019-07-25 | Stop reason: HOSPADM

## 2019-07-25 RX ORDER — SODIUM CHLORIDE 9 MG/ML
INJECTION, SOLUTION INTRAVENOUS
Status: DISCONTINUED | OUTPATIENT
Start: 2019-07-25 | End: 2019-07-25 | Stop reason: HOSPADM

## 2019-07-25 RX ORDER — SODIUM CHLORIDE 9 MG/ML
50 INJECTION, SOLUTION INTRAVENOUS CONTINUOUS
Status: DISCONTINUED | OUTPATIENT
Start: 2019-07-25 | End: 2019-07-25 | Stop reason: HOSPADM

## 2019-07-25 RX ORDER — FLUMAZENIL 0.1 MG/ML
0.2 INJECTION INTRAVENOUS
Status: DISCONTINUED | OUTPATIENT
Start: 2019-07-25 | End: 2019-07-25 | Stop reason: HOSPADM

## 2019-07-25 RX ORDER — MIDAZOLAM HYDROCHLORIDE 1 MG/ML
.25-5 INJECTION, SOLUTION INTRAMUSCULAR; INTRAVENOUS
Status: DISCONTINUED | OUTPATIENT
Start: 2019-07-25 | End: 2019-07-25 | Stop reason: HOSPADM

## 2019-07-25 RX ORDER — DEXTROMETHORPHAN/PSEUDOEPHED 2.5-7.5/.8
1.2 DROPS ORAL
Status: DISCONTINUED | OUTPATIENT
Start: 2019-07-25 | End: 2019-07-25 | Stop reason: HOSPADM

## 2019-07-25 RX ORDER — PROPOFOL 10 MG/ML
INJECTION, EMULSION INTRAVENOUS AS NEEDED
Status: DISCONTINUED | OUTPATIENT
Start: 2019-07-25 | End: 2019-07-25 | Stop reason: HOSPADM

## 2019-07-25 RX ADMIN — SODIUM CHLORIDE 50 ML/HR: 900 INJECTION, SOLUTION INTRAVENOUS at 10:17

## 2019-07-25 RX ADMIN — SODIUM CHLORIDE: 9 INJECTION, SOLUTION INTRAVENOUS at 11:10

## 2019-07-25 RX ADMIN — PROPOFOL 50 MG: 10 INJECTION, EMULSION INTRAVENOUS at 11:28

## 2019-07-25 RX ADMIN — SIMETHICONE 80 MG: 20 SUSPENSION/ DROPS ORAL at 11:37

## 2019-07-25 RX ADMIN — PROPOFOL 125 MCG/KG/MIN: 10 INJECTION, EMULSION INTRAVENOUS at 11:28

## 2019-07-25 NOTE — ANESTHESIA POSTPROCEDURE EVALUATION
Procedure(s):  COLONOSCOPY. MAC    Anesthesia Post Evaluation        Patient location during evaluation: bedside  Level of consciousness: awake  Pain management: satisfactory to patient  Airway patency: patent  Anesthetic complications: no  Cardiovascular status: acceptable  Respiratory status: acceptable  Hydration status: acceptable        Vitals Value Taken Time   /60 7/25/2019 11:59 AM   Temp 36.6 °C (97.9 °F) 7/25/2019 11:46 AM   Pulse 63 7/25/2019 12:02 PM   Resp 20 7/25/2019 12:02 PM   SpO2 98 % 7/25/2019 12:02 PM   Vitals shown include unvalidated device data.

## 2019-07-25 NOTE — H&P
Brandie Martinez M.D.  [phone]            History and Physical       NAME:  Maximus Castillo   :   1950   MRN:   [id number]       Referring Physician:  Dr. Ngozi Triana Date: 2019 11:28 AM    Chief Complaint:  Colon cancer screening    History of Present Illness:  Patient is a 71 y.o. who is seen for colon cancer screening. Denies any ongoing GI complaints. PMH:  Past Medical History:   Diagnosis Date    CAD (coronary artery disease)     Diabetes ([address]     Essential hypertension     Hyperlipidemia     Hypertension     Ill-defined condition     lumbar stenosis       PSH:  Past Surgical History:   Procedure Laterality Date    CARDIAC CATHETERIZATION  2011    LVEDP 23, LVG EF 50%, moderate inferior HK, LM normal, LAD calcified prox 60%, circumflex prox stent patent, OM 1 prox stent patent, RCA long prox/mid stent with 99% in-stent restenosis, T2 flow. Distal short stent probably patent but slow filling hampers assessment    HX HEART CATHETERIZATION  , 2011    x2  and then [address] HX OTHER SURGICAL      tracheotomy at age 3   [facility] HX PTCA      2 stents with   then another stent      HX TONSILLECTOMY      STENT INSERTION  11    PCI of RCA with 3.0 x 18 mm Xience (mid) and 2.75 x 15 Xience (distal)    STRESS TEST CARDIOLITE  11    11 min, basal and mid inferior ischemia, EF 56%       Allergies: Allergies   Allergen Reactions    Niaspan [Niacin] Other (comments)     Flushing and itching       Home Medications:  Prior to Admission Medications   Prescriptions Last Dose Informant Patient Reported? Taking? Cholecalciferol, Vitamin D3, (VITAMIN D3) 1,000 unit cap 2019 at Unknown time  Yes Yes   Sig: Take  by mouth. Omega-3-DHA-EPA-Fish Oil (FISH OIL) 1,000 mg (120 mg-180 mg) cap 2019 at Unknown time  No Yes   Sig: Take 1,000 mg by mouth daily.    SILDENAFIL CITRATE (VIAGRA PO) Unknown at Unknown time  Yes No   Sig: Take 20 mg by mouth. aspirin delayed-release 81 mg tablet 7/25/2019 at 0500 am  Yes Yes   Sig: Take 81 mg by mouth daily. ergocalciferol (ERGOCALCIFEROL) 50,000 unit capsule 7/18/2019 at Unknown time  No Yes   Sig: TAKE 1 CAPSULE BY MOUTH 3 TIMES A WEEK   lisinopril (PRINIVIL, ZESTRIL) 10 mg tablet 7/25/2019 at 0500 am  No Yes   Sig: Take 1 Tab by mouth daily. metFORMIN ER (GLUCOPHAGE XR) 500 mg tablet 7/25/2019 at 0500 am  Yes Yes   Sig: Take  by mouth daily (with dinner). metoprolol succinate (TOPROL-XL) 50 mg XL tablet 7/25/2019 at 0500 am  No Yes   Sig: TAKE 1 TABLET BY MOUTH DAILY   multivitamin (ONE A DAY) tablet 7/18/2019 at Unknown time  Yes Yes   Sig: Take 1 Tab by mouth daily. naproxen (NAPROSYN) 250 mg tablet 7/25/2019 at 0500 am  Yes Yes   Sig: Take 250 mg by mouth as needed. rosuvastatin (CRESTOR) 40 mg tablet 7/24/2019 at pm  No Yes   Sig: TAKE 1 TABLET BY MOUTH AT BEDTIME   tadalafil (CIALIS) 5 mg tablet Unknown at Unknown time  Yes No   Sig: Take 5 mg by mouth as needed. Facility-Administered Medications: None       Hospital Medications:  Current Facility-Administered Medications   Medication Dose Route Frequency    0.9% sodium chloride infusion  50 mL/hr IntraVENous CONTINUOUS    midazolam (VERSED) injection 0.25-5 mg  0.25-5 mg IntraVENous Multiple    naloxone (NARCAN) injection 0.4 mg  0.4 mg IntraVENous Multiple    flumazenil (ROMAZICON) 0.1 mg/mL injection 0.2 mg  0.2 mg IntraVENous Multiple    simethicone (MYLICON) 70LU/9.5CO oral drops 80 mg  1.2 mL Oral Multiple    atropine injection 0.4 mg  0.4 mg IntraVENous ONCE PRN    EPINEPHrine (ADRENALIN) 0.1 mg/mL syringe 1 mg  1 mg Endoscopically ONCE PRN       Social History:  Social History     Tobacco Use    Smoking status: Never Smoker    Smokeless tobacco: Never Used   Substance Use Topics    Alcohol use:  Yes     Alcohol/week: 2.0 standard drinks     Types: 2 Glasses of wine per week Comment: one drink a week        Family History:  Family History   Problem Relation Age of Onset    Coronary Artery Disease Mother     Coronary Artery Disease Father              Review of Systems:      Constitutional: negative fever, negative chills, negative weight loss  Eyes:   negative visual changes  ENT:   negative sore throat, tongue or lip swelling  Respiratory:  negative cough, negative dyspnea  Cards:  negative for chest pain, palpitations, lower extremity edema  GI:   See HPI  :  negative for frequency, dysuria  Integument:  negative for rash and pruritus  Heme:  negative for easy bruising and gum/nose bleeding  Musculoskel: negative for myalgias,  back pain and muscle weakness  Neuro: negative for headaches, dizziness, vertigo  Psych:  negative for feelings of anxiety, depression       Objective:     Patient Vitals for the past 8 hrs:   BP Temp Pulse Resp SpO2 Height Weight   07/25/19 1031 -- 97.9 °F (36.6 °C) -- -- -- -- --   07/25/19 1012 155/72 -- 61 20 99 % 5' 9\" (1.753 m) --   07/25/19 0959 -- -- -- -- -- -- 107.7 kg (237 lb 7 oz)     No intake/output data recorded. No intake/output data recorded. EXAM:     NEURO-a&o   HEENT-wnl   LUNGS-clear    COR-regular rate and rhythym     ABD-soft , no tenderness, no rebound, good bs     EXT-no edema     Data Review     No results for input(s): WBC, HGB, HCT, PLT, HGBEXT, HCTEXT, PLTEXT in the last 72 hours. No results for input(s): NA, K, CL, CO2, BUN, CREA, GLU, PHOS, CA in the last 72 hours. No results for input(s): SGOT, GPT, AP, TBIL, TP, ALB, GLOB, GGT, AML, LPSE in the last 72 hours. No lab exists for component: AMYP, HLPSE  No results for input(s): INR, PTP, APTT in the last 72 hours.     No lab exists for component: INREXT    Patient Active Problem List   Diagnosis Code    Mixed hyperlipidemia E78.2    CAD (coronary artery disease) I25.10    Prediabetes R73.03    HTN (hypertension) I10    Neurogenic claudication due to lumbar spinal stenosis M48.062      Assessment:   · Colon cancer screening   Plan:   · Colonoscopy today.      Signed By: Anne Lubin MD     7/25/2019  11:28 AM

## 2019-07-25 NOTE — PROCEDURES
Amy Benavidez M.D.  [phone]            2019          Colonoscopy Operative Report  Mahin Pizarro  :  1950  [facility] Medical Record Number:  [id number]      Indications:    Screening colonoscopy     :  Sundeep Spencer MD    Referring Provider: Tayla Marshall MD    Sedation:  MAC anesthesia    Pre-Procedural Exam:      Airway: clear,  No airway problems anticipated  Heart: RRR, without gallops or rubs  Lungs: clear bilaterally without wheezes, crackles, or rhonchi  Abdomen: soft, nontender, nondistended, bowel sounds present  Mental Status: awake, alert and oriented to person, place and time     Procedure Details:  After informed consent was obtained with all risks and benefits of procedure explained and preoperative exam completed, the patient was taken to the endoscopy suite and placed in the left lateral decubitus position. Upon sequential sedation as per above, a digital rectal exam was performed. The Olympus videocolonoscope  was inserted in the rectum and carefully advanced to the cecum, which was identified by the ileocecal valve and appendiceal orifice. The quality of preparation was excellent. The colonoscope was slowly withdrawn with careful inspection and evaluation between folds. Retroflexion in the rectum was performed. Findings:   Terminal Ileum: normal  Cecum: normal  Ascending Colon: normal  Transverse Colon: normal  Descending Colon: normal  Sigmoid: no mucosal lesion appreciated  moderate diverticulosis; Rectum: no mucosal lesion appreciated  Grade 1 internal hemorrhoid(s); Interventions:  none    Specimen Removed:  none    Complications: None. EBL:  None. Impression:  Moderate sigmoid diverticulosis and small internal hemorrhoids, otherwise mucosa within normal throughout the colon. Recommendations:  -Repeat colonoscopy in 10 years   -High fiber diet.    -Resume normal medication(s).      Discharge Disposition:  Home in the company of a  when able to ambulate.     Regina Russell MD  7/25/2019  11:44 AM

## 2019-07-25 NOTE — PERIOP NOTES
1128: Anesthesia staff at patient's bedside administering anesthesia and monitoring patients vital signs throughout procedure. See anesthesia note. Patient tolerated procedure without problems. Abdomen soft and patient arousable and voices no complaints Report received from CRNA, see anesthesia note. Patient transported to endoscopy recovery area.  Endoscope was pre-cleaned at bedside immediately following procedure by Nat ALBARRAN

## 2019-07-25 NOTE — ANESTHESIA PREPROCEDURE EVALUATION
Relevant Problems   No relevant active problems       Anesthetic History   No history of anesthetic complications            Review of Systems / Medical History  Patient summary reviewed, nursing notes reviewed and pertinent labs reviewed    Pulmonary  Within defined limits                 Neuro/Psych   Within defined limits           Cardiovascular  Within defined limits  Hypertension          CAD (stable s/p stent)         GI/Hepatic/Renal  Within defined limits              Endo/Other  Within defined limits  Diabetes         Other Findings              Physical Exam    Airway  Mallampati: II  TM Distance: 4 - 6 cm  Neck ROM: normal range of motion   Mouth opening: Normal     Cardiovascular    Rhythm: regular  Rate: normal         Dental  No notable dental hx       Pulmonary  Breath sounds clear to auscultation               Abdominal         Other Findings            Anesthetic Plan    ASA: 3  Anesthesia type: MAC            Anesthetic plan and risks discussed with: Patient

## 2019-07-25 NOTE — DISCHARGE INSTRUCTIONS
[facility]. Katrin Emanuel M.D.  [phone]            COLON DISCHARGE INSTRUCTIONS       2019    Maximus Castillo  :  1950  [facility] Medical Record Number:  [id number]      COLONOSCOPY FINDINGS:  Your colonoscopy showed diverticulosis in the sigmoid colon and internal hemorrhoids, otherwise mucosa within normal throughout the colon. DISCOMFORT:  Redness at IV site- apply warm compress to area; if redness or soreness persist- contact your physician  There may be a slight amount of blood passed from the rectum  Gaseous discomfort- walking, belching will help relieve any discomfort  You may not operate a vehicle for 12 hours  You may not engage in an occupation involving machinery or appliances for rest of today  You may not drink alcoholic beverages for at least 12 hours  Avoid making any critical decisions for at least 24 hour  DIET:   High fiber diet. - however -  remember your colon is empty and a heavy meal will produce gas. Avoid these foods:  vegetables, fried / greasy foods, carbonated drinks for today     ACTIVITY:  You may resume your normal daily activities it is recommended that you spend the remainder of the day resting -  avoid any strenuous activity. CALL M.D. ANY SIGN OF:   Increasing pain, nausea, vomiting  Abdominal distension (swelling)  New increased bleeding (oral or rectal)  Fever (chills)  Pain in chest area  Bloody discharge from nose or mouth   Shortness of breath    Follow-up Instructions:   Call Dr. Linda Flores if any questions or problems. Telephone # [phone]    Should have a repeat colonoscopy in 10 years. Patient Education   Patient Education        Hemorrhoids: Care Instructions  Your Care Instructions    Hemorrhoids are enlarged veins that develop in the anal canal. Bleeding during bowel movements, itching, swelling, and rectal pain are the most common symptoms.  They can be uncomfortable at times, but hemorrhoids rarely are a serious problem. You can treat most hemorrhoids with simple changes to your diet and bowel habits. These changes include eating more fiber and not straining to pass stools. Most hemorrhoids do not need surgery or other treatment unless they are very large and painful or bleed a lot. Follow-up care is a key part of your treatment and safety. Be sure to make and go to all appointments, and call your doctor if you are having problems. It's also a good idea to know your test results and keep a list of the medicines you take. How can you care for yourself at home? · Sit in a few inches of warm water (sitz bath) 3 times a day and after bowel movements. The warm water helps with pain and itching. · Put ice on your anal area several times a day for 10 minutes at a time. Put a thin cloth between the ice and your skin. Follow this by placing a warm, wet towel on the area for another 10 to 20 minutes. · Take pain medicines exactly as directed. ? If the doctor gave you a prescription medicine for pain, take it as prescribed. ? If you are not taking a prescription pain medicine, ask your doctor if you can take an over-the-counter medicine. · Keep the anal area clean, but be gentle. Use water and a fragrance-free soap, such as [place], or use baby wipes or medicated pads, such as Tucks. · Wear cotton underwear and loose clothing to decrease moisture in the anal area. · Eat more fiber. Include foods such as whole-grain breads and cereals, raw vegetables, raw and dried fruits, and beans. · Drink plenty of fluids, enough so that your urine is light yellow or clear like water. If you have kidney, heart, or liver disease and have to limit fluids, talk with your doctor before you increase the amount of fluids you drink. · Use a stool softener that contains bran or psyllium. You can save money by buying bran or psyllium (available in bulk at most health food stores) and sprinkling it on foods or stirring it into fruit juice.  Or you can use a product such as Metamucil or Hydrocil. · Practice healthy bowel habits. ? Go to the bathroom as soon as you have the urge. ? Avoid straining to pass stools. Relax and give yourself time to let things happen naturally. ? Do not hold your breath while passing stools. ? Do not read while sitting on the toilet. Get off the toilet as soon as you have finished. · Take your medicines exactly as prescribed. Call your doctor if you think you are having a problem with your medicine. When should you call for help? Call 911 anytime you think you may need emergency care. For example, call if:    · You pass maroon or very bloody stools.    Call your doctor now or seek immediate medical care if:    · You have increased pain.     · You have increased bleeding.    Watch closely for changes in your health, and be sure to contact your doctor if:    · Your symptoms have not improved after 3 or 4 days. Where can you learn more? Go to http://florecita-frances.info/. Enter F228 in the search box to learn more about \"Hemorrhoids: Care Instructions. \"  Current as of: November 7, 2018  Content Version: 12.1  © 4738-8985 [employer]. Care instructions adapted under license by [employer] (which disclaims liability or warranty for this information). If you have questions about a medical condition or this instruction, always ask your healthcare professional. [place] [address] any warranty or liability for your use of this information. Learning About Diverticulosis and Diverticulitis  What are diverticulosis and diverticulitis? In diverticulosis and diverticulitis, pouches called diverticula form in the wall of the large intestine, or colon. · In diverticulosis, the pouches do not cause any pain or other symptoms. · In diverticulitis, the pouches get inflamed or infected and cause symptoms. Doctors aren't sure what causes these pouches in the colon.  But they think that a low-fiber diet may play a role. Without fiber to add bulk to the stool, the colon has to work harder than normal to push the stool forward. The pressure from this may cause pouches to form in weak spots along the colon. Some people with diverticulosis get diverticulitis. But experts don't know why this happens. What are the symptoms? · In diverticulosis, most people don't have symptoms. But pouches sometimes bleed. · In diverticulitis, symptoms may last from a few hours to a week or more. They include:  ? Belly pain. This is usually in the lower left side. It is sometimes worse when you move. This is the most common symptom. ? Fever and chills. ? Bloating and gas. ? Diarrhea or constipation. ? Nausea and sometimes vomiting.  ? Not feeling like eating. How can you prevent these problems? You may be able to lower your chance of getting diverticulitis. You can do this by taking steps to prevent constipation. · Eat fruits, vegetables, beans, and whole grains every day. These foods are high in fiber. · Drink plenty of fluids (enough so that your urine is light yellow or clear like water). If you have kidney, heart, or liver disease and have to limit fluids, talk with your doctor before you increase the amount of fluids you drink. · Get at least 30 minutes of exercise on most days of the week. Walking is a good choice. You also may want to do other activities, such as running, swimming, cycling, or playing tennis or team sports. · Take a fiber supplement, such as Citrucel or Metamucil, every day if needed. Read and follow all instructions on the label. · Schedule time each day for a bowel movement. Having a daily routine may help. Take your time and do not strain when having a bowel movement. Some people avoid nuts, seeds, berries, and popcorn. They believe that these foods might get trapped in the diverticula and cause pain.  But there is no proof that these foods cause diverticulitis or make it worse. How are these problems treated? · The best way to treat diverticulosis is to avoid constipation. (See the tips above.)  · Treatment for diverticulitis includes antibiotics and often a change in your diet. You may need only liquids at first. Your doctor may suggest pain medicines for pain or belly cramps. In some cases, surgery may be needed. Follow-up care is a key part of your treatment and safety. Be sure to make and go to all appointments, and call your doctor if you are having problems. It's also a good idea to know your test results and keep a list of the medicines you take. Where can you learn more? Go to http://florecita-frances.info/. Enter T341 in the search box to learn more about \"Learning About Diverticulosis and Diverticulitis. \"  Current as of: November 7, 2018  Content Version: 12.1  © 8901-7620 [employer]. Care instructions adapted under license by [employer] (which disclaims liability or warranty for this information). If you have questions about a medical condition or this instruction, always ask your healthcare professional. [address] any warranty or liability for your use of this information.

## 2019-07-25 NOTE — ROUTINE PROCESS
[facility]  1950  [id number]    Situation:  Verbal report received from: BRETT Jamison RN  Procedure: Procedure(s):  COLONOSCOPY    Background:    Preoperative diagnosis: SCREENING  Postoperative diagnosis: diverticulosis, hemorrhoids    :  Dr. Luis E Nguyen  Assistant(s): Endoscopy Technician-1: Kitty Middleton  Endoscopy RN-1: Shell Soares RN    Specimens: * No specimens in log *  H. Pylori  no    Assessment:  Intra-procedure medications     Anesthesia gave intra-procedure sedation and medications, see anesthesia flow sheet yes    Intravenous fluids: NS@ KVO     Vital signs stable     Abdominal assessment: round and soft     Recommendation:  Discharge patient per MD order. Family or Friend   Permission to share finding with family or friend yes    Endoscopy discharge instructions have been reviewed and given to patient and daughter in law. The patient and daughter in law verbalized understanding and acceptance of instructions.

## 2019-09-10 ENCOUNTER — OFFICE VISIT (OUTPATIENT)
Dept: CARDIOLOGY CLINIC | Age: 69
End: 2019-09-10

## 2019-09-10 VITALS
HEART RATE: 67 BPM | HEIGHT: 69 IN | RESPIRATION RATE: 16 BRPM | DIASTOLIC BLOOD PRESSURE: 68 MMHG | OXYGEN SATURATION: 94 % | WEIGHT: 240 LBS | BODY MASS INDEX: 35.55 KG/M2 | SYSTOLIC BLOOD PRESSURE: 136 MMHG

## 2019-09-10 DIAGNOSIS — I25.10 CORONARY ARTERY DISEASE INVOLVING NATIVE CORONARY ARTERY OF NATIVE HEART WITHOUT ANGINA PECTORIS: Primary | ICD-10-CM

## 2019-09-10 DIAGNOSIS — E66.01 SEVERE OBESITY (HCC): ICD-10-CM

## 2019-09-10 DIAGNOSIS — Z01.810 PREOPERATIVE CARDIOVASCULAR EXAMINATION: ICD-10-CM

## 2019-09-10 DIAGNOSIS — E78.2 MIXED HYPERLIPIDEMIA: ICD-10-CM

## 2019-09-10 DIAGNOSIS — I10 ESSENTIAL HYPERTENSION: ICD-10-CM

## 2019-09-10 NOTE — PROGRESS NOTES
Radha Rodriguez is a 71 y.o. male    Chief Complaint   Patient presents with    Surgical Clearance     10/8/19 for spine surgery. Chest pain NO    SOB NO    Dizziness NO    Swelling NO    Refills Crestor    Visit Vitals  /68 (BP 1 Location: Left arm, BP Patient Position: Sitting)   Pulse 67   Resp 16   Ht 5' 9\" (1.753 m)   Wt 240 lb (108.9 kg)   SpO2 94%   BMI 35.44 kg/m²       1. Have you been to the ER, urgent care clinic since your last visit? Hospitalized since your last visit? No    2. Have you seen or consulted any other health care providers outside of the [address] [place] [address] since your last visit? Include any pap smears or colon screening.  No

## 2019-09-11 NOTE — PROGRESS NOTES
[facility] [address], [address] [facility], [address] [place] [address]    Office [phone],Formerly [facility] [phone]           Elmo Mota is a 71 y.o. male presents for pre-operative evaluation prior to orthopedic surgery      Assessment/Recommendations:    ICD-10-CM ICD-9-CM    1. Coronary artery disease involving native coronary artery of native heart without angina pectoris I25.10 414.01 AMB POC EKG ROUTINE W/ 12 LEADS, INTER & REP   2. Preoperative cardiovascular examination Z01.810 V72.81    3. Mixed hyperlipidemia E78.2 272.2 AMB POC EKG ROUTINE W/ 12 LEADS, INTER & REP   4. Essential hypertension I10 401.9 AMB POC EKG ROUTINE W/ 12 LEADS, INTER & REP   5. Severe obesity (HCC) E66.01 278.01      Patient is likely low to moderate risk for adverse cardiovascular event with moderate risk orthopedic surgery. He has functional capacity > 4 METS without symptoms of angina nor decompensated heart failure. He has normal lv function. Recommend proceeding with minimally invasive lumbar decompression surgery without further cardiovascular testing. He can stop aspirin 5 days pre-operatively to mitigate bleeding and restart in post-operative period. Last stent placement was 2011. Primary Care Physician- Jamari Huynh MD    Follow-up 2-3 months with Dr. Langston Older  71 y.o. with hx of CAD, DM, HTN presents for pre-surgical evaluation. He has hx of CAD with prior stenting. Last pci was in 2011 within RCA. Stress test in 2017 with evidence of mild ischemia within basal-mid inferior wall with evidence of infarct with mild vesna-infarct ischemia,SDS 2. 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. He is scheduled to undergo minimally invasive surgery for decompression of lumbar stenosis. He reports no ongoing exertional chest pain, SOB, SMITH, orthopnea, PND.       Past Medical History:   Diagnosis Date    CAD (coronary artery disease)     Diabetes ([facility] [address] [address]     Essential hypertension     Hyperlipidemia     Hypertension     Ill-defined condition     lumbar stenosis        Past Surgical History:   Procedure Laterality Date    CARDIAC CATHETERIZATION  9/29/2011    LVEDP 23, LVG EF 50%, moderate inferior HK, LM normal, LAD calcified prox 60%, circumflex prox stent patent, OM 1 prox stent patent, RCA long prox/mid stent with 99% in-stent restenosis, T2 flow. Distal short stent probably patent but slow filling hampers assessment    COLONOSCOPY N/A 7/25/2019    COLONOSCOPY performed by Miriam Mahmood MD at [address]  2004, 2011    x2  and then [address]    HX OTHER SURGICAL      tracheotomy at age 3   Kyle HX PTCA      2 stents with  2004 then another stent  2005    HX TONSILLECTOMY      STENT INSERTION  9/29/11    PCI of RCA with 3.0 x 18 mm Xience (mid) and 2.75 x 15 Xience (distal)    STRESS TEST CARDIOLITE  5/4/11    11 min, basal and mid inferior ischemia, EF 56%         Current Outpatient Medications:     CHROMIUM PICOLINATE PO, Take 1,000 mg by mouth daily. , Disp: , Rfl:     rosuvastatin (CRESTOR) 40 mg tablet, TAKE 1 TABLET BY MOUTH AT BEDTIME, Disp: 90 Tab, Rfl: 0    lisinopril (PRINIVIL, ZESTRIL) 10 mg tablet, Take 1 Tab by mouth daily. , Disp: 90 Tab, Rfl: 2    metoprolol succinate (TOPROL-XL) 50 mg XL tablet, TAKE 1 TABLET BY MOUTH DAILY, Disp: 90 Tab, Rfl: 2    metFORMIN ER (GLUCOPHAGE XR) 500 mg tablet, Take  by mouth daily (with dinner). , Disp: , Rfl:     ergocalciferol (ERGOCALCIFEROL) 50,000 unit capsule, TAKE 1 CAPSULE BY MOUTH 3 TIMES A WEEK (Patient taking differently: Take 50,000 Units by mouth every seven (7) days.), Disp: 36 Cap, Rfl: 0    SILDENAFIL CITRATE (VIAGRA PO), Take 20 mg by mouth., Disp: , Rfl:     naproxen (NAPROSYN) 250 mg tablet, Take 250 mg by mouth as needed. , Disp: , Rfl:     Omega-3-DHA-EPA-Fish Oil (FISH OIL) 1,000 mg (120 mg-180 mg) cap, Take 1,000 mg by mouth daily. , Disp: 30 Cap, Rfl: 6    multivitamin (ONE A DAY) tablet, Take 1 Tab by mouth daily. , Disp: , Rfl:     aspirin delayed-release 81 mg tablet, Take 81 mg by mouth daily. , Disp: , Rfl:     Cholecalciferol, Vitamin D3, (VITAMIN D3) 1,000 unit cap, Take  by mouth., Disp: , Rfl:     tadalafil (CIALIS) 5 mg tablet, Take 5 mg by mouth as needed. , Disp: , Rfl:     Allergies   Allergen Reactions    Niaspan [Niacin] Other (comments)     Flushing and itching        Family History   Problem Relation Age of Onset    Coronary Artery Disease Mother     Coronary Artery Disease Father        Social History     Tobacco Use    Smoking status: Never Smoker    Smokeless tobacco: Never Used   Substance Use Topics    Alcohol use: Yes     Alcohol/week: 2.0 standard drinks     Types: 2 Glasses of wine per week     Comment: one drink a week     Drug use: No       Review of Symptoms:  Pertinent Positive: back pain, radicular left leg pain with leg weakness  Pertinent Negative: No chest pain, dyspnea on exertion, shortness of breath, orthopnea, PND    All Other systems reviewed and are negative for a Comprehensive ROS (10+)    Physical Exam    Blood pressure 136/68, pulse 67, resp. rate 16, height 5' 9\" (1.753 m), weight 240 lb (108.9 kg), SpO2 94 %. Constitutional:  well-developed and well-nourished. No distress. HENT: Normocephalic. Eyes: No scleral icterus. Neck:  Neck supple. No JVD present. Pulmonary/Chest: Effort normal and breath sounds normal. No respiratory distress, wheezes or rales. Cardiovascular: Normal rate, regular rhythm, S1 S2 . Exam reveals no gallop and no friction rub. No murmur heard. No edema. Extremities:  Normal muscle tone  Abdominal:   No abnormal distension. Neurological:  Moving all extremities, cranial nerves appear grossly intact. Skin: Skin is not cold. Not diaphoretic. No erythema. Psychiatric:  Grossly normal mood and affect. Intact insight.     Objective Data:    ECG: personally reviewed and  intrepreted  9/10/19- sinus rhythm, nsst changes    4/3/17 Spect lexiscan: LVEF 55%; small area of mod intensity basal to mid inferior wall infarct with mild vesna-infarct ischemia. Stress test cardiolite:5/4/11- basal and mid inferior ischemia, EF 56%    Cardiac catheterization:9/29/2011- LVEDP 23, LVG EF 50%, moderate inferior HK, LM normal, LAD calcified prox 60%, circumflex prox stent patent, OM 1 prox stent patent, RCA long prox/mid stent with 99% in-stent restenosis, T2 flow.  Distal short stent probably patent but slow filling hampers assessment   Stent insertion:9/29/11- PCI of RCA with 3.0 x 18 mm Xience (mid) and 2.75 x 15 Xience (distal)                Michelle Cazares DO

## 2019-12-09 ENCOUNTER — OFFICE VISIT (OUTPATIENT)
Dept: CARDIOLOGY CLINIC | Age: 69
End: 2019-12-09

## 2019-12-09 VITALS
OXYGEN SATURATION: 95 % | DIASTOLIC BLOOD PRESSURE: 72 MMHG | HEIGHT: 69 IN | RESPIRATION RATE: 16 BRPM | SYSTOLIC BLOOD PRESSURE: 134 MMHG | WEIGHT: 238.6 LBS | BODY MASS INDEX: 35.34 KG/M2 | HEART RATE: 102 BPM

## 2019-12-09 DIAGNOSIS — I48.0 PAF (PAROXYSMAL ATRIAL FIBRILLATION) (HCC): Primary | ICD-10-CM

## 2019-12-09 DIAGNOSIS — I10 ESSENTIAL HYPERTENSION: ICD-10-CM

## 2019-12-09 RX ORDER — METOPROLOL SUCCINATE 100 MG/1
TABLET, EXTENDED RELEASE ORAL
Qty: 90 TAB | Refills: 0 | Status: SHIPPED | OUTPATIENT
Start: 2019-12-09 | End: 2020-04-09

## 2019-12-09 NOTE — PROGRESS NOTES
Office Follow-up    NAME: Arthurine Goltz   :  1950  MRM:  [id number]    Date:  2019            Assessment:     Problem List  Date Reviewed: 2019          Codes Class Noted    Severe obesity ([address] ICD-10-CM: E66.01  ICD-9-CM: 278.01  9/10/2019        Neurogenic claudication due to lumbar spinal stenosis ICD-10-CM: M48.062  ICD-9-CM: 724.03  2017        Prediabetes (Chronic) ICD-10-CM: R73.03  ICD-9-CM: 790.29  10/4/2011        HTN (hypertension) ICD-10-CM: I10  ICD-9-CM: 401.9  10/4/2011        Mixed hyperlipidemia ICD-10-CM: Q17.0  ICD-9-CM: 272.2  2011    Overview Addendum 2015  9:53 AM by Jose Flannery MD     Lipids 10/5/10 -   HDL 39  LDLp- 2157, LDL-C 102   Lipids 10/5/10 -   HDL 34   Lipids 10/16/10 -  LDL 31 HDL 33 ,              CAD (coronary artery disease) (Chronic) ICD-10-CM: I25.10  ICD-9-CM: 414.00  2011    Overview Addendum 2011  8:33 AM by Kaley Plummer     Inferior MI 2004  CATH:  2004- 2v CAD. LAD prox 40%, OM 80-90%, %  - ------PCI RCA with  stent  --------Staged PCI OM Aug 2004  AP 2005  CATH: 2005- new prox circ subtotal occlusion s/p PCI x 2 stents (Cypher), patent OM and RCA stents, EF 45% [facility] Nails [facility])  CATH: 11: LVEDP 23, LVG EF 50%, moderate inferior HK, LM normal, LAD calcified prox 60%, circumflex prox stent patent, OM 1 prox stent patent, RCA long prox/mid stent with 99% in-stent restenosis, T2 flow. Distal short stent probably patent but slow filling hampers assessment  ------PCI of RCA with 3.0 x 18 mm Xience (mid) and 2.75 x 15 Xience (distal)                      Plan:     1. Atrial fibrillation: This is new for him. Chads vas score is 3. Currently he is on metoprolol. We will increase the dosage to 100 mg p.o. daily. Start Eliquis 5 mg p.o. twice daily. Plan for MOE and DCCV next week Monday. 2. CAD: This is stable. Continue current medications.   There is residual LAD 60% lesion is stable without any ischemia on stress test last noted in 2017   3. Hypertension: Blood pressure is controlled. Continue Toprol-XL. 4. Dyslipidemia: Unable to take statins because of myalgias. Dietary control of cholesterol. FLP is a follow-up primary care physician. 5. Sleep apnea: Continue CPAP. Subjective:     Maira Root, a 71y.o. year-old who presents for followup. He has known history of CAD with prior stent. He has a residual 60% LAD lesion. Stress test back in 2017 was negative for ischemia. He is returning today for follow-up. Last night he woke up in the middle of the night feeling breathless as well as had pulsations in his neck. He checked his carotid and felt his pulsations to be irregular. He had an appointment already with me today therefore he wanted to get that checked as well. No chest pain, he had some shortness of breath on exertion. No lightheadedness or dizziness. No passing out episodes. No significant peripheral edema. EKG in my office today demonstrated atrial fibrillation with heart rate ranging between 100 and 105. Normal ST segment, normal axis, normal intervals. Recently he had lumbar foraminotomy and has felt better from pain perspective but has not been able to get back to the gym.       Exam:     Physical Exam:  Visit Vitals  /72 (BP 1 Location: Left arm, BP Patient Position: Sitting)   Pulse (!) 102   Resp 16   Ht 5' 9\" (1.753 m)   Wt 238 lb 9.6 oz (108.2 kg)   SpO2 95%   BMI 35.24 kg/m²     General appearance - alert, well appearing, and in no distress  Mental status - affect appropriate to mood  Eyes - sclera anicteric, moist mucous membranes  Neck - supple, no significant adenopathy  Chest - clear to auscultation, no wheezes, rales or rhonchi  Heart - normal rate, regular rhythm, normal S1, S2, no murmurs, rubs, clicks or gallops  Abdomen - soft, nontender, nondistended, no masses or organomegaly  Extremities - peripheral pulses normal, no pedal edema  Skin - normal coloration  no rashes    Medications:     Current Outpatient Medications   Medication Sig    CHROMIUM PICOLINATE PO Take 1,000 mg by mouth daily.  lisinopril (PRINIVIL, ZESTRIL) 10 mg tablet Take 1 Tab by mouth daily.  metoprolol succinate (TOPROL-XL) 50 mg XL tablet TAKE 1 TABLET BY MOUTH DAILY    metFORMIN ER (GLUCOPHAGE XR) 500 mg tablet Take  by mouth daily (with dinner).  ergocalciferol (ERGOCALCIFEROL) 50,000 unit capsule TAKE 1 CAPSULE BY MOUTH 3 TIMES A WEEK (Patient taking differently: Take 50,000 Units by mouth every seven (7) days.)    SILDENAFIL CITRATE (VIAGRA PO) Take 20 mg by mouth as needed.  naproxen (NAPROSYN) 250 mg tablet Take 250 mg by mouth as needed.  multivitamin (ONE A DAY) tablet Take 1 Tab by mouth daily.  aspirin delayed-release 81 mg tablet Take 81 mg by mouth daily. No current facility-administered medications for this visit. Diagnostic Data Review:       4/3/17 Spect lexiscan: LVEF 55%; small area of mod intensity basal to mid inferior wall infarct with mild vesna-infarct ischemia. Stress test cardiolite:5/4/11- basal and mid inferior ischemia, EF 56%    Cardiac catheterization:9/29/2011- LVEDP 23, LVG EF 50%, moderate inferior HK, LM normal, LAD calcified prox 60%, circumflex prox stent patent, OM 1 prox stent patent, RCA long prox/mid stent with 99% in-stent restenosis, T2 flow.  Distal short stent probably patent but slow filling hampers assessment   Stent insertion:9/29/11- PCI of RCA with 3.0 x 18 mm Xience (mid) and 2.75 x 15 Xience (distal)      Lab Review:     Lab Results   Component Value Date/Time    Cholesterol, total 163 09/30/2011 06:10 AM    Cholesterol, Total 159 05/22/2017 12:00 AM    HDL Cholesterol 43 09/30/2011 06:10 AM    LDL,Direct 45 10/05/2010 08:23 AM    LDL, calculated 84.2 09/30/2011 06:10 AM    Triglyceride 179 (H) 09/30/2011 06:10 AM CHOL/HDL Ratio 3.8 09/30/2011 06:10 AM     Lab Results   Component Value Date/Time    Creatinine 1.09 11/21/2013 05:30 PM     Lab Results   Component Value Date/Time    BUN 21 (H) 11/21/2013 05:30 PM     Lab Results   Component Value Date/Time    Potassium 4.7 11/21/2013 05:30 PM     Lab Results   Component Value Date/Time    Hemoglobin A1c 6.3 (H) 10/16/2010 06:37 AM    Hemoglobin A1c, External 6.2 02/21/2015     Lab Results   Component Value Date/Time    HGB 14.5 11/21/2013 05:30 PM     Lab Results   Component Value Date/Time    PLATELET 428 22/54/6169 05:30 PM     No results for input(s): CPK, CKMB, TROIQ in the last 72 hours. No lab exists for component: CKQMB, CPKMB             ___________________________________________________    Velrafy Running.  Stew Martínez MD, [facility]

## 2019-12-09 NOTE — PROGRESS NOTES
Medication changes made per Benita. STOP ASA 81 mg. START Eliquis 5 mg BID. INCREASE Metoprolol to 100 mg daily.

## 2019-12-09 NOTE — PROGRESS NOTES
ROOM # 4  Patient stopped Crestor about one month ago due to leg cramps  Woke up last night felt like his heart was skipping beats, fatigues more easily  Visit Vitals  /72 (BP 1 Location: Left arm, BP Patient Position: Sitting)   Pulse (!) 102   Resp 16   Ht 5' 9\" (1.753 m)   Wt 238 lb 9.6 oz (108.2 kg)   SpO2 95%   BMI 35.24 kg/m²

## 2019-12-09 NOTE — PATIENT INSTRUCTIONS
Start Eliquis 5mg po BID 
ASA 81mg po daily. Increase Metoprolol to 100 po daily.   
DCCV and MOE next Monday at 10 am

## 2019-12-09 NOTE — H&P (VIEW-ONLY)
Office Follow-up NAME: Jimmie Morelos :  1950 MRM:  [id number] Date:  2019 Assessment:  
 
Problem List  Date Reviewed: 2019 Codes Class Noted Severe obesity ([address] ICD-10-CM: E66.01 
ICD-9-CM: 278.01  9/10/2019 Neurogenic claudication due to lumbar spinal stenosis ICD-10-CM: O81.195 
ICD-9-CM: 724.03  2017 Prediabetes (Chronic) ICD-10-CM: R73.03 
ICD-9-CM: 790.29  10/4/2011 HTN (hypertension) ICD-10-CM: I10 
ICD-9-CM: 401.9  10/4/2011 Mixed hyperlipidemia ICD-10-CM: E78.2 ICD-9-CM: 272.2  2011 Overview Addendum 2015  9:53 AM by Martinez Prado MD  
  Lipids 10/5/10 -   HDL 39  LDLp- 2157, LDL-C 102 Lipids 10/5/10 -   HDL 34  Lipids 10/16/10 -  LDL 31 HDL 33 ,  CAD (coronary artery disease) (Chronic) ICD-10-CM: I25.10 ICD-9-CM: 414.00  2011 Overview Addendum 2011  8:33 AM by Ethan Klein Inferior MI 2004 CATH:  2004- 2v CAD. LAD prox 40%, OM 80-90%, % - ------PCI RCA with  stent 
--------Staged PCI OM Aug 2004 AP 2005 CATH: 2005- new prox circ subtotal occlusion s/p PCI x 2 stents (Cypher), patent OM and RCA stents, EF 45% Ramin Ledbetter @ [facility]) CATH: 11: LVEDP 23, LVG EF 50%, moderate inferior HK, LM normal, LAD calcified prox 60%, circumflex prox stent patent, OM 1 prox stent patent, RCA long prox/mid stent with 99% in-stent restenosis, T2 flow. Distal short stent probably patent but slow filling hampers assessment 
------PCI of RCA with 3.0 x 18 mm Xience (mid) and 2.75 x 15 Xience (distal) Plan: 1. Atrial fibrillation: This is new for him. Chads vas score is 3. Currently he is on metoprolol. We will increase the dosage to 100 mg p.o. daily. Start Eliquis 5 mg p.o. twice daily. Plan for MOE and DCCV next week Monday. 2. CAD: This is stable. Continue current medications. There is residual LAD 60% lesion is stable without any ischemia on stress test last noted in 2017 3. Hypertension: Blood pressure is controlled. Continue Toprol-XL. 4. Dyslipidemia: Unable to take statins because of myalgias. Dietary control of cholesterol. FLP is a follow-up primary care physician. 5. Sleep apnea: Continue CPAP. Subjective:  
 
Barbie Chand, a 71y.o. year-old who presents for followup. He has known history of CAD with prior stent. He has a residual 60% LAD lesion. Stress test back in 2017 was negative for ischemia. He is returning today for follow-up. Last night he woke up in the middle of the night feeling breathless as well as had pulsations in his neck. He checked his carotid and felt his pulsations to be irregular. He had an appointment already with me today therefore he wanted to get that checked as well. No chest pain, he had some shortness of breath on exertion. No lightheadedness or dizziness. No passing out episodes. No significant peripheral edema. EKG in my office today demonstrated atrial fibrillation with heart rate ranging between 100 and 105. Normal ST segment, normal axis, normal intervals. Recently he had lumbar foraminotomy and has felt better from pain perspective but has not been able to get back to the gym. Exam:  
 
Physical Exam: 
Visit Vitals /72 (BP 1 Location: Left arm, BP Patient Position: Sitting) Pulse (!) 102 Resp 16 Ht 5' 9\" (1.753 m) Wt 238 lb 9.6 oz (108.2 kg) SpO2 95% BMI 35.24 kg/m² General appearance - alert, well appearing, and in no distress Mental status - affect appropriate to mood Eyes - sclera anicteric, moist mucous membranes Neck - supple, no significant adenopathy Chest - clear to auscultation, no wheezes, rales or rhonchi Heart - normal rate, regular rhythm, normal S1, S2, no murmurs, rubs, clicks or gallops Abdomen - soft, nontender, nondistended, no masses or organomegaly Extremities - peripheral pulses normal, no pedal edema Skin - normal coloration  no rashes Medications:  
 
Current Outpatient Medications Medication Sig  CHROMIUM PICOLINATE PO Take 1,000 mg by mouth daily.  lisinopril (PRINIVIL, ZESTRIL) 10 mg tablet Take 1 Tab by mouth daily.  metoprolol succinate (TOPROL-XL) 50 mg XL tablet TAKE 1 TABLET BY MOUTH DAILY  metFORMIN ER (GLUCOPHAGE XR) 500 mg tablet Take  by mouth daily (with dinner).  ergocalciferol (ERGOCALCIFEROL) 50,000 unit capsule TAKE 1 CAPSULE BY MOUTH 3 TIMES A WEEK (Patient taking differently: Take 50,000 Units by mouth every seven (7) days.)  SILDENAFIL CITRATE (VIAGRA PO) Take 20 mg by mouth as needed.  naproxen (NAPROSYN) 250 mg tablet Take 250 mg by mouth as needed.  multivitamin (ONE A DAY) tablet Take 1 Tab by mouth daily.  aspirin delayed-release 81 mg tablet Take 81 mg by mouth daily. No current facility-administered medications for this visit. Diagnostic Data Review:  
 
 
4/3/17 Spect lexiscan: LVEF 55%; small area of mod intensity basal to mid inferior wall infarct with mild vesna-infarct ischemia. Stress test cardiolite:5/4/11- basal and mid inferior ischemia, EF 56% Cardiac catheterization:9/29/2011- LVEDP 23, LVG EF 50%, moderate inferior HK, LM normal, LAD calcified prox 60%, circumflex prox stent patent, OM 1 prox stent patent, RCA long prox/mid stent with 99% in-stent restenosis, T2 flow. Distal short stent probably patent but slow filling hampers assessment Stent insertion:9/29/11- PCI of RCA with 3.0 x 18 mm Xience (mid) and 2.75 x 15 Xience (distal) Lab Review:  
 
Lab Results Component Value Date/Time  Cholesterol, total 163 09/30/2011 06:10 AM  
 Cholesterol, Total 159 05/22/2017 12:00 AM  
 HDL Cholesterol 43 09/30/2011 06:10 AM  
 LDL,Direct 45 10/05/2010 08:23 AM  
 LDL, calculated 84.2 09/30/2011 06:10 AM  
 Triglyceride 179 (H) 09/30/2011 06:10 AM  
 CHOL/HDL Ratio 3.8 09/30/2011 06:10 AM  
 
Lab Results Component Value Date/Time Creatinine 1.09 11/21/2013 05:30 PM  
 
Lab Results Component Value Date/Time BUN 21 (H) 11/21/2013 05:30 PM  
 
Lab Results Component Value Date/Time Potassium 4.7 11/21/2013 05:30 PM  
 
Lab Results Component Value Date/Time Hemoglobin A1c 6.3 (H) 10/16/2010 06:37 AM  
 Hemoglobin A1c, External 6.2 02/21/2015 Lab Results Component Value Date/Time HGB 14.5 11/21/2013 05:30 PM  
 
Lab Results Component Value Date/Time PLATELET 948 21/54/7725 05:30 PM  
 
No results for input(s): CPK, CKMB, TROIQ in the last 72 hours. No lab exists for component: CKQMB, CPKMB 
 
        
___________________________________________________ Devin Pandey MD, [facility]

## 2019-12-10 ENCOUNTER — TELEPHONE (OUTPATIENT)
Dept: CARDIOLOGY CLINIC | Age: 69
End: 2019-12-10

## 2019-12-10 DIAGNOSIS — I48.0 PAF (PAROXYSMAL ATRIAL FIBRILLATION) (HCC): Primary | ICD-10-CM

## 2019-12-10 RX ORDER — SODIUM CHLORIDE 0.9 % (FLUSH) 0.9 %
5-40 SYRINGE (ML) INJECTION EVERY 8 HOURS
Status: CANCELLED | OUTPATIENT
Start: 2019-12-16

## 2019-12-10 RX ORDER — SODIUM CHLORIDE 0.9 % (FLUSH) 0.9 %
5-40 SYRINGE (ML) INJECTION AS NEEDED
Status: CANCELLED | OUTPATIENT
Start: 2019-12-16

## 2019-12-10 NOTE — PROGRESS NOTES
[employer] message sent to patient concerning MOE/DCCV procedure. (Pt IDx2). Instructions given to pt per VO of Dr. Sy Romero. Pt NPO after midnight; hold none and take all other meds with sip of water in AM; have someone available to drive pt to and from procedure; pack a bag in case an overnight stay is warranted. MOE/DCCV scheduled for 1000 on 12/16/19. Pt advised to arrive 2hrs prior to procedure for prep. Labs STAT that day. Pt expressed understanding. Opportunities for questions, clarifications, and concerns provided.

## 2019-12-16 ENCOUNTER — HOSPITAL ENCOUNTER (OUTPATIENT)
Dept: NON INVASIVE DIAGNOSTICS | Age: 69
Discharge: HOME | End: 2019-12-16
Attending: INTERNAL MEDICINE | Admitting: INTERNAL MEDICINE
Payer: MEDICARE

## 2019-12-16 VITALS
DIASTOLIC BLOOD PRESSURE: 57 MMHG | BODY MASS INDEX: 35.25 KG/M2 | HEART RATE: 60 BPM | HEIGHT: 69 IN | WEIGHT: 238 LBS | OXYGEN SATURATION: 97 % | RESPIRATION RATE: 11 BRPM | SYSTOLIC BLOOD PRESSURE: 105 MMHG

## 2019-12-16 DIAGNOSIS — I48.0 PAF (PAROXYSMAL ATRIAL FIBRILLATION) (HCC): ICD-10-CM

## 2019-12-16 LAB
ANION GAP SERPL CALC-SCNC: 3 MMOL/L (ref 5–15)
BUN SERPL-MCNC: 11 MG/DL (ref 6–20)
BUN/CREAT SERPL: 12 (ref 12–20)
CALCIUM SERPL-MCNC: 8.6 MG/DL (ref 8.5–10.1)
CHLORIDE SERPL-SCNC: 109 MMOL/L (ref 97–108)
CO2 SERPL-SCNC: 29 MMOL/L (ref 21–32)
CREAT SERPL-MCNC: 0.94 MG/DL (ref 0.7–1.3)
GLUCOSE SERPL-MCNC: 126 MG/DL (ref 65–100)
INR PPP: 1.1 (ref 0.9–1.1)
MAGNESIUM SERPL-MCNC: 1.9 MG/DL (ref 1.6–2.4)
POTASSIUM SERPL-SCNC: 4.3 MMOL/L (ref 3.5–5.1)
PROTHROMBIN TIME: 11.1 SEC (ref 9–11.1)
SODIUM SERPL-SCNC: 141 MMOL/L (ref 136–145)

## 2019-12-16 PROCEDURE — 85610 PROTHROMBIN TIME: CPT

## 2019-12-16 PROCEDURE — C8925 2D TEE W OR W/O FOL W/CON,IN: HCPCS

## 2019-12-16 PROCEDURE — 74011000250 HC RX REV CODE- 250: Performed by: INTERNAL MEDICINE

## 2019-12-16 PROCEDURE — 83735 ASSAY OF MAGNESIUM: CPT

## 2019-12-16 PROCEDURE — 80048 BASIC METABOLIC PNL TOTAL CA: CPT

## 2019-12-16 PROCEDURE — 99152 MOD SED SAME PHYS/QHP 5/>YRS: CPT

## 2019-12-16 PROCEDURE — 36415 COLL VENOUS BLD VENIPUNCTURE: CPT

## 2019-12-16 PROCEDURE — 99152 MOD SED SAME PHYS/QHP 5/>YRS: CPT | Performed by: INTERNAL MEDICINE

## 2019-12-16 PROCEDURE — 93005 ELECTROCARDIOGRAM TRACING: CPT

## 2019-12-16 PROCEDURE — 77030018729 HC ELECTRD DEFIB PAD CARD -B

## 2019-12-16 PROCEDURE — 74011250636 HC RX REV CODE- 250/636: Performed by: INTERNAL MEDICINE

## 2019-12-16 RX ORDER — LIDOCAINE 50 MG/G
OINTMENT TOPICAL
Status: DISCONTINUED | OUTPATIENT
Start: 2019-12-16 | End: 2019-12-17 | Stop reason: HOSPADM

## 2019-12-16 RX ORDER — LIDOCAINE HYDROCHLORIDE 20 MG/ML
15 SOLUTION OROPHARYNGEAL
Status: COMPLETED | OUTPATIENT
Start: 2019-12-16 | End: 2019-12-16

## 2019-12-16 RX ORDER — SODIUM CHLORIDE 0.9 % (FLUSH) 0.9 %
5-40 SYRINGE (ML) INJECTION AS NEEDED
Status: DISCONTINUED | OUTPATIENT
Start: 2019-12-16 | End: 2019-12-17 | Stop reason: HOSPADM

## 2019-12-16 RX ORDER — SODIUM CHLORIDE 0.9 % (FLUSH) 0.9 %
5-40 SYRINGE (ML) INJECTION EVERY 8 HOURS
Status: DISCONTINUED | OUTPATIENT
Start: 2019-12-16 | End: 2019-12-17 | Stop reason: HOSPADM

## 2019-12-16 RX ORDER — MIDAZOLAM HYDROCHLORIDE 1 MG/ML
.5-1 INJECTION, SOLUTION INTRAMUSCULAR; INTRAVENOUS
Status: DISCONTINUED | OUTPATIENT
Start: 2019-12-16 | End: 2019-12-17 | Stop reason: HOSPADM

## 2019-12-16 RX ORDER — SODIUM CHLORIDE 9 MG/ML
10 INJECTION INTRAMUSCULAR; INTRAVENOUS; SUBCUTANEOUS
Status: DISCONTINUED | OUTPATIENT
Start: 2019-12-16 | End: 2019-12-17 | Stop reason: HOSPADM

## 2019-12-16 RX ORDER — FENTANYL CITRATE 50 UG/ML
25-200 INJECTION, SOLUTION INTRAMUSCULAR; INTRAVENOUS
Status: DISCONTINUED | OUTPATIENT
Start: 2019-12-16 | End: 2019-12-17 | Stop reason: HOSPADM

## 2019-12-16 RX ADMIN — MIDAZOLAM 1 MG: 1 INJECTION INTRAMUSCULAR; INTRAVENOUS at 11:11

## 2019-12-16 RX ADMIN — FENTANYL CITRATE 25 MCG: 50 INJECTION, SOLUTION INTRAMUSCULAR; INTRAVENOUS at 11:21

## 2019-12-16 RX ADMIN — MIDAZOLAM 2 MG: 1 INJECTION INTRAMUSCULAR; INTRAVENOUS at 11:20

## 2019-12-16 RX ADMIN — LIDOCAINE HYDROCHLORIDE 15 ML: 20 SOLUTION ORAL; TOPICAL at 09:39

## 2019-12-16 RX ADMIN — LIDOCAINE: 50 OINTMENT TOPICAL at 10:27

## 2019-12-16 RX ADMIN — FENTANYL CITRATE 25 MCG: 50 INJECTION, SOLUTION INTRAMUSCULAR; INTRAVENOUS at 11:09

## 2019-12-16 RX ADMIN — SODIUM CHLORIDE 10 ML: 9 INJECTION INTRAMUSCULAR; INTRAVENOUS; SUBCUTANEOUS at 11:18

## 2019-12-16 RX ADMIN — LIDOCAINE: 50 OINTMENT TOPICAL at 09:45

## 2019-12-16 RX ADMIN — MIDAZOLAM 1 MG: 1 INJECTION INTRAMUSCULAR; INTRAVENOUS at 11:09

## 2019-12-16 RX ADMIN — MIDAZOLAM 2 MG: 1 INJECTION INTRAMUSCULAR; INTRAVENOUS at 11:03

## 2019-12-16 RX ADMIN — FENTANYL CITRATE 25 MCG: 50 INJECTION, SOLUTION INTRAMUSCULAR; INTRAVENOUS at 11:03

## 2019-12-16 NOTE — INTERVAL H&P NOTE
H&P Update:  Jimmie Morelos was seen and examined. History and physical has been reviewed. The patient has been examined.  There have been no significant clinical changes since the completion of the originally dated History and Physical.

## 2019-12-16 NOTE — PROCEDURES
CARDIOVERSION PROCEDURE NOTE    Date of Procedure: 12/16/2019   Cardiologist: Krystal Packer MD  Anesthesia:  IV concious sedation    Preprocedure Diagnosis:   1. AFIB    Postoperative Diagnosis:  Afib      Complications: none  Blood Loss: none  Specimen removed: none      Procedure Note:  After informed consent was obtained, the patient was brought back to the lab in fasting condition. The presenting rhythm was atrial fibrillation. AP and PA defib pads were placed in the appropriate regions on the chest wall. Patient was given Versed and Fentanyl for conscious sedation per nursing personnel. Continuous pulse oximetry and cuff BP monitoring was done. Once appropriately sedated, the patient was given one 200 J biphasic synchronized shock that resulted in Sinus Rhythm. The patient was observed until alert and oriented times 3. This Study Demonstrates:  1. Afib      Recommendations:  1. Continue Eliquis. Devin Bernard Ma, MD, [facility]

## 2019-12-16 NOTE — ROUTINE PROCESS
Pt sedated with 6mg Versed and 75mcg Fentanyl for MOE and DCCV.  Given 1 synchronized shock(s) at 200 Joules, A-fib converted to SB w/ PAC's.(monitored sedation from 1103 to 1124)

## 2019-12-16 NOTE — ROUTINE PROCESS
Patient arrived to Non-Invasive Cardiology Lab for Out Patient MOE/CV Procedure. Staff introduced to patient. Patient identifiers verified with Name and Date of Birth. Procedure verified with patient. Consent forms reviewed and signed by patient or authorized representative and verified. Allergies verified. Patient informed of procedure and plan of care. Questions answered with review. Patient on cardiac monitor, non-invasive blood pressure, SPO2 monitor. Patient is A&Ox3. Patient reports no complaints. Patient on stretcher, in low position, with side rails up. Patient instructed to call for assistance as needed. Wife in waiting room.

## 2019-12-16 NOTE — DISCHARGE INSTRUCTIONS
DISCHARGE SUMMARY     PATIENT INSTRUCTIONS:     You had a transesophageal echocardiogram, your throat was numbed for the procedure, so start with sips of water and advance diet as swallowing returns to normal. Avoid any scalding hot beverages for the next 2 hours. The cardioversion procedure can cause redness to the skin where the patches were placed. You may treat this with Aloe or Cortisone cream over the counter lotion. If the skin appears very reddened or blistered contact your physician      What to do at Home:  Recommended activity: No driving today      The discharge information has been reviewed with the patient and his wife. The patient and his wife verbalized understanding.

## 2019-12-17 LAB
ATRIAL RATE: 58 BPM
CALCULATED P AXIS, ECG09: 50 DEGREES
CALCULATED R AXIS, ECG10: 25 DEGREES
CALCULATED T AXIS, ECG11: 27 DEGREES
DIAGNOSIS, 93000: NORMAL
P-R INTERVAL, ECG05: 168 MS
Q-T INTERVAL, ECG07: 402 MS
QRS DURATION, ECG06: 102 MS
QTC CALCULATION (BEZET), ECG08: 394 MS
VENTRICULAR RATE, ECG03: 58 BPM

## 2019-12-23 ENCOUNTER — OFFICE VISIT (OUTPATIENT)
Dept: CARDIOLOGY CLINIC | Age: 69
End: 2019-12-23

## 2019-12-23 VITALS
WEIGHT: 237 LBS | DIASTOLIC BLOOD PRESSURE: 80 MMHG | OXYGEN SATURATION: 98 % | RESPIRATION RATE: 16 BRPM | HEART RATE: 68 BPM | BODY MASS INDEX: 35.1 KG/M2 | SYSTOLIC BLOOD PRESSURE: 146 MMHG | HEIGHT: 69 IN

## 2019-12-23 DIAGNOSIS — I48.0 PAF (PAROXYSMAL ATRIAL FIBRILLATION) (HCC): Primary | ICD-10-CM

## 2019-12-23 NOTE — PROGRESS NOTES
Office Follow-up    NAME: Maira Root   :  1950  MRM:  [id number]    Date:  2019            Assessment:     Problem List  Date Reviewed: 2019          Codes Class Noted    Severe obesity ([address] ICD-10-CM: E66.01  ICD-9-CM: 278.01  9/10/2019        Neurogenic claudication due to lumbar spinal stenosis ICD-10-CM: M48.062  ICD-9-CM: 724.03  2017        Prediabetes (Chronic) ICD-10-CM: R73.03  ICD-9-CM: 790.29  10/4/2011        HTN (hypertension) ICD-10-CM: I10  ICD-9-CM: 401.9  10/4/2011        Mixed hyperlipidemia ICD-10-CM: E39.6  ICD-9-CM: 272.2  2011    Overview Addendum 2015  9:53 AM by Izella Dancer, MD     Lipids 10/5/10 -   HDL 39  LDLp- 2157, LDL-C 102   Lipids 10/5/10 -   HDL 34   Lipids 10/16/10 -  LDL 31 HDL 33 ,              CAD (coronary artery disease) (Chronic) ICD-10-CM: I25.10  ICD-9-CM: 414.00  2011    Overview Addendum 2011  8:33 AM by Kimberly Avila MI 2004  CATH:  2004- 2v CAD. LAD prox 40%, OM 80-90%, %  - ------PCI RCA with  stent  --------Staged PCI OM Aug 2004  AP 2005  CATH: 2005- new prox circ subtotal occlusion s/p PCI x 2 stents (Cypher), patent OM and RCA stents, EF 45% Myra Alexander @ [facility])  CATH: 11: LVEDP 23, LVG EF 50%, moderate inferior HK, LM normal, LAD calcified prox 60%, circumflex prox stent patent, OM 1 prox stent patent, RCA long prox/mid stent with 99% in-stent restenosis, T2 flow. Distal short stent probably patent but slow filling hampers assessment  ------PCI of RCA with 3.0 x 18 mm Xience (mid) and 2.75 x 15 Xience (distal)                      Plan:     1. Atrial fibrillation: He is status post DCCV on 2019. Chads vas score is 3. Continue metoprolol and Eliquis. Metoprolol was previously increased to 100 mg on prior visit prior to DCCV. 2. CAD: This is stable. Continue current medications.   There is residual LAD 60% lesion is stable without any ischemia on stress test last noted in 2017   3. Hypertension: Blood pressure is controlled. Continue Toprol-XL. 4. Dyslipidemia: Unable to take statins because of myalgias. Dietary control of cholesterol. FLP is a follow-up primary care physician. 5. Sleep apnea: Continue CPAP. 6. See Dr. Joana Koroma in 1 month. Subjective:     Melia Stringer, a 71y.o. year-old who presents for followup. He underwent MOE and cardioversion on December 16, 2019 which was successful to convert him into sinus rhythm. He is returning today for follow-up. He has known history of CAD with prior stent. He has a residual 60% LAD lesion. Stress test back in 2017 was negative for ischemia. Symptoms of shortness of breath have improved. No symptoms of chest pain, palpitations or lightheadedness. EKG in my office today demonstrated normal sinus rhythm with heart rate of 62 bpm.      Exam:     Physical Exam:  Visit Vitals  /80 (BP 1 Location: Left arm, BP Patient Position: Supine)   Pulse 68   Resp 16   Ht 5' 9\" (1.753 m)   Wt 237 lb (107.5 kg)   SpO2 98%   BMI 35.00 kg/m²     General appearance - alert, well appearing, and in no distress  Mental status - affect appropriate to mood  Eyes - sclera anicteric, moist mucous membranes  Neck - supple, no significant adenopathy    Medications:     Current Outpatient Medications   Medication Sig    apixaban (ELIQUIS) 5 mg tablet Take 1 Tab by mouth two (2) times a day.  metoprolol succinate (TOPROL-XL) 100 mg tablet TAKE 1 TABLET BY MOUTH DAILY    lisinopril (PRINIVIL, ZESTRIL) 10 mg tablet Take 1 Tab by mouth daily.  metFORMIN ER (GLUCOPHAGE XR) 500 mg tablet Take  by mouth daily (with dinner).  ergocalciferol (ERGOCALCIFEROL) 50,000 unit capsule TAKE 1 CAPSULE BY MOUTH 3 TIMES A WEEK (Patient taking differently: Take 50,000 Units by mouth every seven (7) days.)    SILDENAFIL CITRATE (VIAGRA PO) Take 20 mg by mouth as needed.     naproxen (NAPROSYN) 250 mg tablet Take 250 mg by mouth as needed.  multivitamin (ONE A DAY) tablet Take 1 Tab by mouth daily. No current facility-administered medications for this visit. Diagnostic Data Review:       4/3/17 Spect lexiscan: LVEF 55%; small area of mod intensity basal to mid inferior wall infarct with mild vesna-infarct ischemia. Stress test cardiolite:5/4/11- basal and mid inferior ischemia, EF 56%    Cardiac catheterization:9/29/2011- LVEDP 23, LVG EF 50%, moderate inferior HK, LM normal, LAD calcified prox 60%, circumflex prox stent patent, OM 1 prox stent patent, RCA long prox/mid stent with 99% in-stent restenosis, T2 flow. Distal short stent probably patent but slow filling hampers assessment   Stent insertion:9/29/11- PCI of RCA with 3.0 x 18 mm Xience (mid) and 2.75 x 15 Xience (distal)      Lab Review:     Lab Results   Component Value Date/Time    Cholesterol, total 163 09/30/2011 06:10 AM    Cholesterol, Total 159 05/22/2017 12:00 AM    HDL Cholesterol 43 09/30/2011 06:10 AM    LDL,Direct 45 10/05/2010 08:23 AM    LDL, calculated 84.2 09/30/2011 06:10 AM    Triglyceride 179 (H) 09/30/2011 06:10 AM    CHOL/HDL Ratio 3.8 09/30/2011 06:10 AM     Lab Results   Component Value Date/Time    Creatinine 0.94 12/16/2019 10:15 AM     Lab Results   Component Value Date/Time    BUN 11 12/16/2019 10:15 AM     Lab Results   Component Value Date/Time    Potassium 4.3 12/16/2019 10:15 AM     Lab Results   Component Value Date/Time    Hemoglobin A1c 6.3 (H) 10/16/2010 06:37 AM    Hemoglobin A1c, External 6.2 02/21/2015     Lab Results   Component Value Date/Time    HGB 14.5 11/21/2013 05:30 PM     Lab Results   Component Value Date/Time    PLATELET 354 81/67/8197 05:30 PM     No results for input(s): CPK, CKMB, TROIQ in the last 72 hours. No lab exists for component: CKQMB, CPKMB             ___________________________________________________    Carri Larson.  Cami Lombardi MD, [facility]

## 2019-12-23 NOTE — PROGRESS NOTES
Chief Complaint   Patient presents with    Follow-up     MOE/DCCV     Visit Vitals  /80 (BP 1 Location: Left arm, BP Patient Position: Supine)   Pulse 68   Resp 16   Ht 5' 9\" (1.753 m)   Wt 237 lb (107.5 kg)   SpO2 98%   BMI 35.00 kg/m²         Patient presents in office without complaint. Recent MOE/DCCV with Dr. Joana Koroma 12/16/19. Refills?  None

## 2020-01-10 ENCOUNTER — TELEPHONE (OUTPATIENT)
Dept: CARDIOLOGY CLINIC | Age: 70
End: 2020-01-10

## 2020-01-10 NOTE — TELEPHONE ENCOUNTER
Patient states that he was recently switched from aspirin to eliquis and that with coupons eliquis is too expensive. Patient states that he has about a weeks worth of samples left and than would like to know what can be done after that. Please advise.      Phone: [phone]

## 2020-01-10 NOTE — TELEPHONE ENCOUNTER
Spoke with patient (IDx2). Patient states that the pharmacy told him that his Eliquis would cost him $450 a month. Discussed possibility of needing a PA or something through insurance to determine what cost will be after approved/if approved. PA placed will take up to 5 days to hear back.     PA# [id number]

## 2020-01-24 NOTE — TELEPHONE ENCOUNTER
Spoke with patient about Eliquis 5 mg BID. He has been approved by [employer]; however, he will need to pay his deductible prior to his Eliquis being $47/month. Patient states that he will take his ASA BID until he meets his deductible. Offered samples, but patient \"already made up [his] mind. \"

## 2020-01-31 ENCOUNTER — OFFICE VISIT (OUTPATIENT)
Dept: CARDIOLOGY CLINIC | Age: 70
End: 2020-01-31

## 2020-01-31 VITALS
BODY MASS INDEX: 35.55 KG/M2 | DIASTOLIC BLOOD PRESSURE: 70 MMHG | HEIGHT: 69 IN | RESPIRATION RATE: 16 BRPM | OXYGEN SATURATION: 97 % | SYSTOLIC BLOOD PRESSURE: 162 MMHG | WEIGHT: 240 LBS

## 2020-01-31 DIAGNOSIS — I10 ESSENTIAL HYPERTENSION: ICD-10-CM

## 2020-01-31 DIAGNOSIS — I48.0 PAF (PAROXYSMAL ATRIAL FIBRILLATION) (HCC): Primary | ICD-10-CM

## 2020-01-31 DIAGNOSIS — I25.10 CORONARY ARTERY DISEASE INVOLVING NATIVE CORONARY ARTERY OF NATIVE HEART WITHOUT ANGINA PECTORIS: ICD-10-CM

## 2020-01-31 RX ORDER — ASPIRIN 81 MG/1
81 TABLET ORAL 2 TIMES DAILY
COMMUNITY

## 2020-01-31 NOTE — PROGRESS NOTES
Office Follow-up    NAME: Nellie Ibrahim   :  1950  MRM:  [id number]    Date:  2020            Assessment:     Problem List  Date Reviewed: 2020          Codes Class Noted    Severe obesity ([address] ICD-10-CM: E66.01  ICD-9-CM: 278.01  9/10/2019        Neurogenic claudication due to lumbar spinal stenosis ICD-10-CM: M48.062  ICD-9-CM: 724.03  2017        Prediabetes (Chronic) ICD-10-CM: R73.03  ICD-9-CM: 790.29  10/4/2011        HTN (hypertension) ICD-10-CM: I10  ICD-9-CM: 401.9  10/4/2011        Mixed hyperlipidemia ICD-10-CM: T13.6  ICD-9-CM: 272.2  2011    Overview Addendum 2015  9:53 AM by Kendall Randall MD     Lipids 10/5/10 -   HDL 39  LDLp- 2157, LDL-C 102   Lipids 10/5/10 -   HDL 34   Lipids 10/16/10 -  LDL 31 HDL 33 ,              CAD (coronary artery disease) (Chronic) ICD-10-CM: I25.10  ICD-9-CM: 414.00  2011    Overview Addendum 2011  8:33 AM by Chadwick Brooks     Inferior MI 2004  CATH:  2004- 2v CAD. LAD prox 40%, OM 80-90%, %  - ------PCI RCA with  stent  --------Staged PCI OM Aug 2004  AP 2005  CATH: 2005- new prox circ subtotal occlusion s/p PCI x 2 stents (Cypher), patent OM and RCA stents, EF 45% Brigette Haro @ [address] [place] [address])  CATH: 11: LVEDP 23, LVG EF 50%, moderate inferior HK, LM normal, LAD calcified prox 60%, circumflex prox stent patent, OM 1 prox stent patent, RCA long prox/mid stent with 99% in-stent restenosis, T2 flow. Distal short stent probably patent but slow filling hampers assessment  ------PCI of RCA with 3.0 x 18 mm Xience (mid) and 2.75 x 15 Xience (distal)                      Plan:     1. Atrial fibrillation: He is status post DCCV on 2019. Chads vas score is 3. He was on Eliquis few days prior to the cardioversion as well as post cardioversion but discontinued couple days ago because of high deductible.   I offered him Coumadin however he does not want to take Coumadin at this time and would like to resume Eliquis once his deductible is lower later in the year. Currently he is on aspirin which we will continue. Continue metoprolol. Metoprolol was previously increased to 100 mg on prior visit prior to [facility]. 2. CAD: This is stable. Continue current medications. There is residual LAD 60% lesion is stable without any ischemia on stress test last noted in 2017   3. Hypertension: Blood pressure is controlled. Continue Toprol-XL. 4. Dyslipidemia: Unable to take statins because of myalgias. Dietary control of cholesterol. FLP is a follow-up primary care physician. 5. Sleep apnea: Continue CPAP. 6. See Dr. Luba Knutson in 6 months. Subjective:     Jessee Olsen, a 71y.o. year-old who presents for followup. He underwent MOE and cardioversion on December 16, 2019 which was successful to convert him into sinus rhythm. He is returning today for follow-up. He has known history of CAD with prior stent. He has a residual 60% LAD lesion. Stress test back in 2017 was negative for ischemia. Symptoms of shortness of breath have improved. No symptoms of chest pain, palpitations or lightheadedness. He is stopped taking Eliquis few days ago because of significantly high co-pay. He would like to resume Eliquis once his co-pay or deductible is less later in the year. EKG in my office today demonstrated heart rate of 60 bpm with poor R wave progression with T wave inversions in the inferior lead.       Exam:     Physical Exam:  Visit Vitals  /70 (BP 1 Location: Left arm, BP Patient Position: Sitting)   Resp 16   Ht 5' 9\" (1.753 m)   Wt 240 lb (108.9 kg)   SpO2 97%   BMI 35.44 kg/m²     General appearance - alert, well appearing, and in no distress  Mental status - affect appropriate to mood  Eyes - sclera anicteric, moist mucous membranes  Neck - supple, no significant adenopathy    Medications:     Current Outpatient Medications   Medication Sig    aspirin delayed-release 81 mg tablet Take 81 mg by mouth two (2) times a day.  metoprolol succinate (TOPROL-XL) 100 mg tablet TAKE 1 TABLET BY MOUTH DAILY    lisinopril (PRINIVIL, ZESTRIL) 10 mg tablet Take 1 Tab by mouth daily.  metFORMIN ER (GLUCOPHAGE XR) 500 mg tablet Take  by mouth daily (with dinner).  ergocalciferol (ERGOCALCIFEROL) 50,000 unit capsule TAKE 1 CAPSULE BY MOUTH 3 TIMES A WEEK (Patient taking differently: Take 50,000 Units by mouth every seven (7) days.)    SILDENAFIL CITRATE (VIAGRA PO) Take 20 mg by mouth as needed.  naproxen (NAPROSYN) 250 mg tablet Take 250 mg by mouth as needed.  multivitamin (ONE A DAY) tablet Take 1 Tab by mouth daily. No current facility-administered medications for this visit. Diagnostic Data Review:       4/3/17 Spect lexiscan: LVEF 55%; small area of mod intensity basal to mid inferior wall infarct with mild vesna-infarct ischemia. Stress test cardiolite:5/4/11- basal and mid inferior ischemia, EF 56%    Cardiac catheterization:9/29/2011- LVEDP 23, LVG EF 50%, moderate inferior HK, LM normal, LAD calcified prox 60%, circumflex prox stent patent, OM 1 prox stent patent, RCA long prox/mid stent with 99% in-stent restenosis, T2 flow.  Distal short stent probably patent but slow filling hampers assessment   Stent insertion:9/29/11- PCI of RCA with 3.0 x 18 mm Xience (mid) and 2.75 x 15 Xience (distal)      Lab Review:     Lab Results   Component Value Date/Time    Cholesterol, total 163 09/30/2011 06:10 AM    Cholesterol, Total 159 05/22/2017 12:00 AM    HDL Cholesterol 43 09/30/2011 06:10 AM    LDL,Direct 45 10/05/2010 08:23 AM    LDL, calculated 84.2 09/30/2011 06:10 AM    Triglyceride 179 (H) 09/30/2011 06:10 AM    CHOL/HDL Ratio 3.8 09/30/2011 06:10 AM     Lab Results   Component Value Date/Time    Creatinine 0.94 12/16/2019 10:15 AM     Lab Results   Component Value Date/Time    BUN 11 12/16/2019 10:15 AM     Lab Results   Component Value Date/Time    Potassium 4.3 12/16/2019 10:15 AM     Lab Results   Component Value Date/Time    Hemoglobin A1c 6.3 (H) 10/16/2010 06:37 AM    Hemoglobin A1c, External 6.2 02/21/2015     Lab Results   Component Value Date/Time    HGB 14.5 11/21/2013 05:30 PM     Lab Results   Component Value Date/Time    PLATELET 406 04/15/7305 05:30 PM     No results for input(s): CPK, CKMB, TROIQ in the last 72 hours. No lab exists for component: CKQMB, CPKMB             ___________________________________________________    Gabriela Davidson.  Skyler Joyner MD, [facility] [place]

## 2020-01-31 NOTE — PROGRESS NOTES
Chief Complaint   Patient presents with    Irregular Heart Beat    Follow-up     1 month; stopped Eliquis s/p DCCV in 12/2019     Visit Vitals  /70 (BP 1 Location: Left arm, BP Patient Position: Sitting)   Resp 16   Ht 5' 9\" (1.753 m)   Wt 240 lb (108.9 kg)   SpO2 97%   BMI 35.44 kg/m²     Patient presents in office with c/o slight edema in BLEs. Patient has stopped Eliquis until he clears his deductible. He is taking ASA 81 BID. No recent hospital visits. No refills needed at this time.

## 2020-04-09 RX ORDER — METOPROLOL SUCCINATE 100 MG/1
TABLET, EXTENDED RELEASE ORAL
Qty: 90 TAB | Refills: 0 | Status: SHIPPED | OUTPATIENT
Start: 2020-04-09 | End: 2020-04-09 | Stop reason: SDUPTHER

## 2020-04-09 RX ORDER — METOPROLOL SUCCINATE 100 MG/1
TABLET, EXTENDED RELEASE ORAL
Qty: 90 TAB | Refills: 1 | Status: SHIPPED | OUTPATIENT
Start: 2020-04-09 | End: 2020-10-26

## 2020-04-09 NOTE — TELEPHONE ENCOUNTER
Per VO of Dr. Teofilo Hickey: 1/31/2020    Future Appointments   Date Time Provider Darron Vanegas   8/14/2020  1:20 PM Rand Cuello MD [facility]       Requested Prescriptions     Signed Prescriptions Disp Refills    metoprolol succinate (TOPROL-XL) 100 mg tablet 90 Tab 1     Sig: TAKE ONE TABLET BY MOUTH DAILY     Authorizing Provider: Talib Hauser     Ordering User: Gilmar Brooks

## 2020-08-14 ENCOUNTER — OFFICE VISIT (OUTPATIENT)
Dept: CARDIOLOGY CLINIC | Age: 70
End: 2020-08-14
Payer: MEDICARE

## 2020-08-14 VITALS
HEIGHT: 69 IN | OXYGEN SATURATION: 97 % | SYSTOLIC BLOOD PRESSURE: 138 MMHG | DIASTOLIC BLOOD PRESSURE: 82 MMHG | HEART RATE: 60 BPM | BODY MASS INDEX: 35.34 KG/M2 | WEIGHT: 238.6 LBS

## 2020-08-14 DIAGNOSIS — I48.0 PAF (PAROXYSMAL ATRIAL FIBRILLATION) (HCC): Primary | ICD-10-CM

## 2020-08-14 DIAGNOSIS — I25.10 CORONARY ARTERY DISEASE INVOLVING NATIVE CORONARY ARTERY OF NATIVE HEART WITHOUT ANGINA PECTORIS: ICD-10-CM

## 2020-08-14 DIAGNOSIS — I10 ESSENTIAL HYPERTENSION: ICD-10-CM

## 2020-08-14 PROCEDURE — G8752 SYS BP LESS 140: HCPCS | Performed by: INTERNAL MEDICINE

## 2020-08-14 PROCEDURE — 1101F PT FALLS ASSESS-DOCD LE1/YR: CPT | Performed by: INTERNAL MEDICINE

## 2020-08-14 PROCEDURE — G8427 DOCREV CUR MEDS BY ELIG CLIN: HCPCS | Performed by: INTERNAL MEDICINE

## 2020-08-14 PROCEDURE — G8536 NO DOC ELDER MAL SCRN: HCPCS | Performed by: INTERNAL MEDICINE

## 2020-08-14 PROCEDURE — G8417 CALC BMI ABV UP PARAM F/U: HCPCS | Performed by: INTERNAL MEDICINE

## 2020-08-14 PROCEDURE — G8510 SCR DEP NEG, NO PLAN REQD: HCPCS | Performed by: INTERNAL MEDICINE

## 2020-08-14 PROCEDURE — G8754 DIAS BP LESS 90: HCPCS | Performed by: INTERNAL MEDICINE

## 2020-08-14 PROCEDURE — 99214 OFFICE O/P EST MOD 30 MIN: CPT | Performed by: INTERNAL MEDICINE

## 2020-08-14 PROCEDURE — 3017F COLORECTAL CA SCREEN DOC REV: CPT | Performed by: INTERNAL MEDICINE

## 2020-08-14 PROCEDURE — G0444 DEPRESSION SCREEN ANNUAL: HCPCS | Performed by: INTERNAL MEDICINE

## 2020-08-14 NOTE — PROGRESS NOTES
Office Follow-up    NAME: Ancelmo Harrison   :  1950  MRM:  [id number]    Date:  2020            Assessment:     Problem List  Date Reviewed: 2020          Codes Class Noted    Severe obesity ([address] ICD-10-CM: E66.01  ICD-9-CM: 278.01  9/10/2019        Neurogenic claudication due to lumbar spinal stenosis ICD-10-CM: M48.062  ICD-9-CM: 724.03  2017        Prediabetes (Chronic) ICD-10-CM: R73.03  ICD-9-CM: 790.29  10/4/2011        HTN (hypertension) ICD-10-CM: I10  ICD-9-CM: 401.9  10/4/2011        Mixed hyperlipidemia ICD-10-CM: S26.4  ICD-9-CM: 272.2  2011    Overview Addendum 2015  9:53 AM by Chris Lopez MD     Lipids 10/5/10 -   HDL 39  LDLp- 2157, LDL-C 102   Lipids 10/5/10 -   HDL 34   Lipids 10/16/10 -  LDL 31 HDL 33 ,              CAD (coronary artery disease) (Chronic) ICD-10-CM: I25.10  ICD-9-CM: 414.00  2011    Overview Addendum 2011  8:33 AM by Ana Luisa Bone     Inferior MI 2004  CATH:  2004- 2v CAD. LAD prox 40%, OM 80-90%, %  - ------PCI RCA with  stent  --------Staged PCI OM Aug 2004  AP 2005  CATH: 2005- new prox circ subtotal occlusion s/p PCI x 2 stents (Cypher), patent OM and RCA stents, EF 45% Stephen Art @ [facility])  CATH: 11: LVEDP 23, LVG EF 50%, moderate inferior HK, LM normal, LAD calcified prox 60%, circumflex prox stent patent, OM 1 prox stent patent, RCA long prox/mid stent with 99% in-stent restenosis, T2 flow. Distal short stent probably patent but slow filling hampers assessment  ------PCI of RCA with 3.0 x 18 mm Xience (mid) and 2.75 x 15 Xience (distal)                      Plan:     1. Atrial fibrillation: He is status post DCCV on 2019. Chads vas score is 3. He does not want to take anticoagulation due to cost of Eliquis as well as for the long term nature of the therapy. Offered Coumadin but not interested. Continue metoprolol.   Metoprolol was previously increased to 100 mg on prior visit prior to DCCV. 2. CAD: This is stable. Continue current medications. There is residual LAD 60% lesion is stable without any ischemia on stress test last noted in 2017   3. Hypertension: Blood pressure is controlled. Continue Toprol-XL. 4. Dyslipidemia: Unable to take statins because of myalgias. Dietary control of cholesterol. FLP is a follow-up primary care physician. 5. Sleep apnea: Continue CPAP. 6. See Dr. Berta Huang in 6 months. Subjective:     Sherry Moffett, a 79y.o. year-old who presents for followup. He underwent MOE and cardioversion on December 16, 2019 which was successful to convert him into sinus rhythm. He is returning today for follow-up. He has known history of CAD with prior stent. He has a residual 60% LAD lesion. Stress test back in 2017 was negative for ischemia. Symptoms of shortness of breath have improved. No symptoms of chest pain, palpitations or lightheadedness. He is stopped taking Eliquis few days ago because of significantly high co-pay. He would like to resume Eliquis once his co-pay or deductible is less later in the year. EKG in my office today demonstrated heart rate of 60 bpm with poor R wave progression with T wave inversions in the inferior lead. Exam:     Physical Exam:  Visit Vitals  /82 (BP 1 Location: Left arm, BP Patient Position: Sitting)   Pulse 60   Ht 5' 9\" (1.753 m)   Wt 238 lb 9.6 oz (108.2 kg)   SpO2 97%   BMI 35.24 kg/m²     General appearance - alert, well appearing, and in no distress  Mental status - affect appropriate to mood  Eyes - sclera anicteric, moist mucous membranes  Neck - supple, no significant adenopathy    Medications:     Current Outpatient Medications   Medication Sig    metoprolol succinate (TOPROL-XL) 100 mg tablet TAKE ONE TABLET BY MOUTH DAILY    aspirin delayed-release 81 mg tablet Take 81 mg by mouth two (2) times a day.     lisinopril (Jean Pierre Adair) 10 mg tablet Take 1 Tab by mouth daily.  metFORMIN ER (GLUCOPHAGE XR) 500 mg tablet Take  by mouth daily (with dinner).  ergocalciferol (ERGOCALCIFEROL) 50,000 unit capsule TAKE 1 CAPSULE BY MOUTH 3 TIMES A WEEK (Patient taking differently: Take 50,000 Units by mouth every seven (7) days.)    SILDENAFIL CITRATE (VIAGRA PO) Take 20 mg by mouth as needed.  naproxen (NAPROSYN) 250 mg tablet Take 250 mg by mouth as needed.  multivitamin (ONE A DAY) tablet Take 1 Tab by mouth daily. No current facility-administered medications for this visit. Diagnostic Data Review:       4/3/17 Spect lexiscan: LVEF 55%; small area of mod intensity basal to mid inferior wall infarct with mild vesna-infarct ischemia. Stress test cardiolite:5/4/11- basal and mid inferior ischemia, EF 56%    Cardiac catheterization:9/29/2011- LVEDP 23, LVG EF 50%, moderate inferior HK, LM normal, LAD calcified prox 60%, circumflex prox stent patent, OM 1 prox stent patent, RCA long prox/mid stent with 99% in-stent restenosis, T2 flow.  Distal short stent probably patent but slow filling hampers assessment   Stent insertion:9/29/11- PCI of RCA with 3.0 x 18 mm Xience (mid) and 2.75 x 15 Xience (distal)      Lab Review:     Lab Results   Component Value Date/Time    Cholesterol, total 163 09/30/2011 06:10 AM    Cholesterol, Total 159 05/22/2017 12:00 AM    HDL Cholesterol 43 09/30/2011 06:10 AM    LDL,Direct 45 10/05/2010 08:23 AM    LDL, calculated 84.2 09/30/2011 06:10 AM    Triglyceride 179 (H) 09/30/2011 06:10 AM    CHOL/HDL Ratio 3.8 09/30/2011 06:10 AM     Lab Results   Component Value Date/Time    Creatinine 0.94 12/16/2019 10:15 AM     Lab Results   Component Value Date/Time    BUN 11 12/16/2019 10:15 AM     Lab Results   Component Value Date/Time    Potassium 4.3 12/16/2019 10:15 AM     Lab Results   Component Value Date/Time    Hemoglobin A1c 6.3 (H) 10/16/2010 06:37 AM    Hemoglobin A1c, External 6.2 02/21/2015     Lab Results Component Value Date/Time    HGB 14.5 11/21/2013 05:30 PM     Lab Results   Component Value Date/Time    PLATELET 767 07/29/3681 05:30 PM     No results for input(s): CPK, CKMB, TROIQ in the last 72 hours. No lab exists for component: KIMMIE, CPSMILEYB             ___________________________________________________    Mitchell Grijalva.  Sy Romero MD, [facility] [place]

## 2020-08-14 NOTE — PROGRESS NOTES
Visit Vitals  /82 (BP 1 Location: Left arm, BP Patient Position: Sitting)   Pulse 60   Ht 5' 9\" (1.753 m)   Wt 238 lb 9.6 oz (108.2 kg)   SpO2 97%   BMI 35.24 kg/m²         PCP isn't receiving notes from Dr. Renita Quintanilla! Labs?

## 2020-10-26 RX ORDER — METOPROLOL SUCCINATE 100 MG/1
TABLET, EXTENDED RELEASE ORAL
Qty: 90 TAB | Refills: 3 | Status: SHIPPED | OUTPATIENT
Start: 2020-10-26 | End: 2021-08-10 | Stop reason: SDUPTHER

## 2020-10-26 NOTE — TELEPHONE ENCOUNTER
Per VO of Dr. Will Lawton: 1/31/2020    Future Appointments   Date Time Provider Darron Romina   2/22/2021  2:20 PM Dinesh Cuello MD [facility]       Requested Prescriptions     Pending Prescriptions Disp Refills    metoprolol succinate (TOPROL-XL) 100 mg tablet [Pharmacy Med Name: METOPROLOL SUCC  MG TAB] 90 Tab 0     Sig: TAKE ONE TABLET BY MOUTH DAILY

## 2021-02-02 RX ORDER — LISINOPRIL 10 MG/1
10 TABLET ORAL DAILY
Qty: 90 TAB | Refills: 2 | OUTPATIENT
Start: 2021-02-02

## 2021-02-02 NOTE — TELEPHONE ENCOUNTER
Per VO of Dr. Yousuf Peace    LOV: 8/14/2020    Future Appointments   Date Time Provider Darron Vanegas   3/8/2021  1:40 PM Tori Cuello MD [facility]       Requested Prescriptions     Pending Prescriptions Disp Refills    lisinopriL (PRINIVIL, ZESTRIL) 10 mg tablet 90 Tab 2     Sig: Take 1 Tab by mouth daily.

## 2021-03-08 ENCOUNTER — OFFICE VISIT (OUTPATIENT)
Dept: CARDIOLOGY CLINIC | Age: 71
End: 2021-03-08
Payer: MEDICARE

## 2021-03-08 VITALS
WEIGHT: 240 LBS | DIASTOLIC BLOOD PRESSURE: 85 MMHG | OXYGEN SATURATION: 95 % | HEIGHT: 69 IN | BODY MASS INDEX: 35.55 KG/M2 | HEART RATE: 65 BPM | SYSTOLIC BLOOD PRESSURE: 135 MMHG

## 2021-03-08 DIAGNOSIS — I25.10 CORONARY ARTERY DISEASE INVOLVING NATIVE CORONARY ARTERY OF NATIVE HEART WITHOUT ANGINA PECTORIS: ICD-10-CM

## 2021-03-08 DIAGNOSIS — I48.0 PAF (PAROXYSMAL ATRIAL FIBRILLATION) (HCC): Primary | ICD-10-CM

## 2021-03-08 DIAGNOSIS — I10 ESSENTIAL HYPERTENSION: ICD-10-CM

## 2021-03-08 PROCEDURE — G8427 DOCREV CUR MEDS BY ELIG CLIN: HCPCS | Performed by: INTERNAL MEDICINE

## 2021-03-08 PROCEDURE — 93010 ELECTROCARDIOGRAM REPORT: CPT | Performed by: INTERNAL MEDICINE

## 2021-03-08 PROCEDURE — G8432 DEP SCR NOT DOC, RNG: HCPCS | Performed by: INTERNAL MEDICINE

## 2021-03-08 PROCEDURE — G0463 HOSPITAL OUTPT CLINIC VISIT: HCPCS | Performed by: INTERNAL MEDICINE

## 2021-03-08 PROCEDURE — 1101F PT FALLS ASSESS-DOCD LE1/YR: CPT | Performed by: INTERNAL MEDICINE

## 2021-03-08 PROCEDURE — 93005 ELECTROCARDIOGRAM TRACING: CPT | Performed by: INTERNAL MEDICINE

## 2021-03-08 PROCEDURE — G8536 NO DOC ELDER MAL SCRN: HCPCS | Performed by: INTERNAL MEDICINE

## 2021-03-08 PROCEDURE — 99214 OFFICE O/P EST MOD 30 MIN: CPT | Performed by: INTERNAL MEDICINE

## 2021-03-08 PROCEDURE — G8417 CALC BMI ABV UP PARAM F/U: HCPCS | Performed by: INTERNAL MEDICINE

## 2021-03-08 PROCEDURE — G8754 DIAS BP LESS 90: HCPCS | Performed by: INTERNAL MEDICINE

## 2021-03-08 PROCEDURE — 3017F COLORECTAL CA SCREEN DOC REV: CPT | Performed by: INTERNAL MEDICINE

## 2021-03-08 PROCEDURE — G8752 SYS BP LESS 140: HCPCS | Performed by: INTERNAL MEDICINE

## 2021-03-08 NOTE — PROGRESS NOTES
Chief Complaint   Patient presents with    Irregular Heart Beat    Coronary Artery Disease    Hypertension    Follow-up     6 month     Visit Vitals  /85 (BP 1 Location: Left upper arm, BP Patient Position: Sitting)   Pulse 65   Ht 5' 9\" (1.753 m)   Wt 240 lb (108.9 kg)   SpO2 95%   BMI 35.44 kg/m²     Chest pain denied   SOB denied  Dizziness denied  Swelling in hands/feet denied   Recent hospital stays denied

## 2021-03-08 NOTE — PROGRESS NOTES
Office Follow-up    NAME: Keya Hayden   :  1950  MRM:  [id number]    Date:  3/8/2021            Assessment:     Problem List  Date Reviewed: 2020          Codes Class Noted    Severe obesity ([address] ICD-10-CM: E66.01  ICD-9-CM: 278.01  9/10/2019        Neurogenic claudication due to lumbar spinal stenosis ICD-10-CM: M48.062  ICD-9-CM: 724.03  2017        Prediabetes (Chronic) ICD-10-CM: R73.03  ICD-9-CM: 790.29  10/4/2011        HTN (hypertension) ICD-10-CM: I10  ICD-9-CM: 401.9  10/4/2011        Mixed hyperlipidemia ICD-10-CM: F70.0  ICD-9-CM: 272.2  2011    Overview Addendum 2015  9:53 AM by Sal Velasco MD     Lipids 10/5/10 -   HDL 39  LDLp- 2157, LDL-C 102   Lipids 10/5/10 -   HDL 34   Lipids 10/16/10 -  LDL 31 HDL 33 ,              CAD (coronary artery disease) (Chronic) ICD-10-CM: I25.10  ICD-9-CM: 414.00  2011    Overview Addendum 2011  8:33 AM by Rodrick Avila MI 2004  CATH:  2004- 2v CAD. LAD prox 40%, OM 80-90%, %  - ------PCI RCA with  stent  --------Staged PCI OM Aug 2004  AP 2005  CATH: 2005- new prox circ subtotal occlusion s/p PCI x 2 stents (Cypher), patent OM and RCA stents, EF 45% Silver Shashank @ [facility])  CATH: 11: LVEDP 23, LVG EF 50%, moderate inferior HK, LM normal, LAD calcified prox 60%, circumflex prox stent patent, OM 1 prox stent patent, RCA long prox/mid stent with 99% in-stent restenosis, T2 flow. Distal short stent probably patent but slow filling hampers assessment  ------PCI of RCA with 3.0 x 18 mm Xience (mid) and 2.75 x 15 Xience (distal)                      Plan:     1. Atrial fibrillation: He is status post DCCV on 2019. Chads vas score is 3. He does not want to take anticoagulation due to cost of Eliquis as well as for the long term nature of the therapy. In past we offered Coumadin but not interested. Continue metoprolol.    2. CAD: This is stable. Continue current medications. There is residual LAD 60% lesion is stable without any ischemia on stress test last noted in 2017   3. Hypertension: Blood pressure is controlled. Continue Toprol-XL. 4. Dyslipidemia: Unable to take statins because of myalgias. Dietary control of cholesterol. FLP is follow-up primary care physician. 5. Sleep apnea: Uses dental device. 6. See Dr. Fannie Bynum in 6 months. He got   this year. Subjective:     Newton Frankel, a 70y.o. year-old who presents for followup. He has CAD, Afib s/p DCCV. No chest pain, SOB, palpitations, dizziness, presyncope or syncope. EKG in my office today demonstrated sinus rhythm. Exam:     Physical Exam:  Visit Vitals  /85 (BP 1 Location: Left upper arm, BP Patient Position: Sitting)   Pulse 65   Ht 5' 9\" (1.753 m)   Wt 240 lb (108.9 kg)   SpO2 95%   BMI 35.44 kg/m²     General appearance - alert, well appearing, and in no distress  Mental status - affect appropriate to mood  Eyes - sclera anicteric, moist mucous membranes  Neck - supple, no significant adenopathy    Medications:     Current Outpatient Medications   Medication Sig    metoprolol succinate (TOPROL-XL) 100 mg tablet TAKE ONE TABLET BY MOUTH DAILY    aspirin delayed-release 81 mg tablet Take 81 mg by mouth two (2) times a day.  lisinopril (PRINIVIL, ZESTRIL) 10 mg tablet Take 1 Tab by mouth daily.  ergocalciferol (ERGOCALCIFEROL) 50,000 unit capsule TAKE 1 CAPSULE BY MOUTH 3 TIMES A WEEK (Patient taking differently: Take 50,000 Units by mouth every seven (7) days.)    SILDENAFIL CITRATE (VIAGRA PO) Take 20 mg by mouth as needed.  naproxen (NAPROSYN) 250 mg tablet Take 250 mg by mouth as needed.  multivitamin (ONE A DAY) tablet Take 1 Tab by mouth daily.  metFORMIN ER (GLUCOPHAGE XR) 500 mg tablet Take  by mouth daily (with dinner). No current facility-administered medications for this visit.        Diagnostic Data Review:       4/3/17 Spect lexiscan: LVEF 55%; small area of mod intensity basal to mid inferior wall infarct with mild vesna-infarct ischemia. Stress test cardiolite:5/4/11- basal and mid inferior ischemia, EF 56%    Cardiac catheterization:9/29/2011- LVEDP 23, LVG EF 50%, moderate inferior HK, LM normal, LAD calcified prox 60%, circumflex prox stent patent, OM 1 prox stent patent, RCA long prox/mid stent with 99% in-stent restenosis, T2 flow. Distal short stent probably patent but slow filling hampers assessment   Stent insertion:9/29/11- PCI of RCA with 3.0 x 18 mm Xience (mid) and 2.75 x 15 Xience (distal)      Lab Review:     Lab Results   Component Value Date/Time    Cholesterol, total 163 09/30/2011 06:10 AM    Cholesterol, Total 159 05/22/2017 12:00 AM    HDL Cholesterol 43 09/30/2011 06:10 AM    LDL,Direct 45 10/05/2010 08:23 AM    LDL, calculated 84.2 09/30/2011 06:10 AM    Triglyceride 179 (H) 09/30/2011 06:10 AM    CHOL/HDL Ratio 3.8 09/30/2011 06:10 AM     Lab Results   Component Value Date/Time    Creatinine 0.94 12/16/2019 10:15 AM     Lab Results   Component Value Date/Time    BUN 11 12/16/2019 10:15 AM     Lab Results   Component Value Date/Time    Potassium 4.3 12/16/2019 10:15 AM     Lab Results   Component Value Date/Time    Hemoglobin A1c 6.3 (H) 10/16/2010 06:37 AM    Hemoglobin A1c, External 6.2 02/21/2015     Lab Results   Component Value Date/Time    HGB 14.5 11/21/2013 05:30 PM     Lab Results   Component Value Date/Time    PLATELET 232 13/65/5721 05:30 PM     No results for input(s): CPK, CKMB, TROIQ in the last 72 hours. No lab exists for component: CKQMB, CPKMB             ___________________________________________________    Joao Burrows.  Cally Collier MD, [facility] [place]

## 2021-03-22 ENCOUNTER — TELEPHONE (OUTPATIENT)
Dept: CARDIOLOGY CLINIC | Age: 71
End: 2021-03-22

## 2021-03-22 NOTE — TELEPHONE ENCOUNTER
Spoke with pt, I identified with name and birth date. Pt stated that last night he thinks he went back into A fib. He had a hard time sleeping and had palpitations. But this morning he feels normal again and his pulse id [id number], but he has a HA and feels tired. I advised him on how to perform some vagal maneuvers, (suggested he look them up on the Internet for more suggestions) should a fib reoccur. I further instructed him that if he maintains a HR of >110 despite maneuvers, he should go to the ED. Also if he has another episode to call the office, he may need to have another cardioversion. As for his other symptoms I suggested motrin or tylenol for the HA and take a nap, since he didn't sleep well. Pt expressed understanding. Pt has no further questions or concerns at this time.

## 2021-03-23 ENCOUNTER — TELEPHONE (OUTPATIENT)
Dept: CARDIOLOGY CLINIC | Age: 71
End: 2021-03-23

## 2021-03-23 ENCOUNTER — OFFICE VISIT (OUTPATIENT)
Dept: CARDIOLOGY CLINIC | Age: 71
End: 2021-03-23
Payer: MEDICARE

## 2021-03-23 VITALS
DIASTOLIC BLOOD PRESSURE: 86 MMHG | WEIGHT: 241 LBS | BODY MASS INDEX: 35.7 KG/M2 | HEART RATE: 88 BPM | OXYGEN SATURATION: 100 % | SYSTOLIC BLOOD PRESSURE: 128 MMHG | HEIGHT: 69 IN

## 2021-03-23 DIAGNOSIS — I48.0 PAF (PAROXYSMAL ATRIAL FIBRILLATION) (HCC): Primary | ICD-10-CM

## 2021-03-23 DIAGNOSIS — E78.2 MIXED HYPERLIPIDEMIA: ICD-10-CM

## 2021-03-23 DIAGNOSIS — I10 ESSENTIAL HYPERTENSION: ICD-10-CM

## 2021-03-23 DIAGNOSIS — I25.10 CORONARY ARTERY DISEASE INVOLVING NATIVE CORONARY ARTERY OF NATIVE HEART WITHOUT ANGINA PECTORIS: Chronic | ICD-10-CM

## 2021-03-23 PROCEDURE — G8417 CALC BMI ABV UP PARAM F/U: HCPCS | Performed by: INTERNAL MEDICINE

## 2021-03-23 PROCEDURE — G8752 SYS BP LESS 140: HCPCS | Performed by: INTERNAL MEDICINE

## 2021-03-23 PROCEDURE — 3017F COLORECTAL CA SCREEN DOC REV: CPT | Performed by: INTERNAL MEDICINE

## 2021-03-23 PROCEDURE — 93005 ELECTROCARDIOGRAM TRACING: CPT | Performed by: INTERNAL MEDICINE

## 2021-03-23 PROCEDURE — G8536 NO DOC ELDER MAL SCRN: HCPCS | Performed by: INTERNAL MEDICINE

## 2021-03-23 PROCEDURE — G0463 HOSPITAL OUTPT CLINIC VISIT: HCPCS | Performed by: INTERNAL MEDICINE

## 2021-03-23 PROCEDURE — G8428 CUR MEDS NOT DOCUMENT: HCPCS | Performed by: INTERNAL MEDICINE

## 2021-03-23 PROCEDURE — 1101F PT FALLS ASSESS-DOCD LE1/YR: CPT | Performed by: INTERNAL MEDICINE

## 2021-03-23 PROCEDURE — 93010 ELECTROCARDIOGRAM REPORT: CPT | Performed by: INTERNAL MEDICINE

## 2021-03-23 PROCEDURE — G8754 DIAS BP LESS 90: HCPCS | Performed by: INTERNAL MEDICINE

## 2021-03-23 PROCEDURE — G8510 SCR DEP NEG, NO PLAN REQD: HCPCS | Performed by: INTERNAL MEDICINE

## 2021-03-23 PROCEDURE — 99214 OFFICE O/P EST MOD 30 MIN: CPT | Performed by: INTERNAL MEDICINE

## 2021-03-23 RX ORDER — AMIODARONE HYDROCHLORIDE 400 MG/1
400 TABLET ORAL DAILY
Qty: 30 TAB | Refills: 0 | Status: SHIPPED | OUTPATIENT
Start: 2021-03-23 | End: 2021-03-23 | Stop reason: SDUPTHER

## 2021-03-23 RX ORDER — AMIODARONE HYDROCHLORIDE 200 MG/1
TABLET ORAL
Qty: 20 TAB | Refills: 0 | Status: SHIPPED | OUTPATIENT
Start: 2021-03-23 | End: 2021-03-29 | Stop reason: SDUPTHER

## 2021-03-23 NOTE — TELEPHONE ENCOUNTER
Returned pt call, ID [id number]. Pt had  called inquiring still going in and out of A fib, has tried vagal maneuvers. They help but his heart beat is still irregular. I scheduled him for this afternoon at 1320. Pt has no further questions or concerns at this time.

## 2021-03-23 NOTE — PROGRESS NOTES
Room 6    Visit Vitals  /86 (BP 1 Location: Left upper arm, BP Patient Position: Sitting, BP Cuff Size: Large adult)   Pulse 88   Ht 5' 9\" (1.753 m)   Wt 241 lb (109.3 kg)   SpO2 100%   BMI 35.59 kg/m²       Discuss ezetimibe, prescription sent by PCP with no discussion prior to.     Chest pain: no  Shortness of breath: YES with exertion   Edema: no  Palpitations: no  Dizziness: no    New diagnosis/Surgeries: no    ER/Hospitalizations: no    Refills: no

## 2021-03-23 NOTE — PROGRESS NOTES
All orders entered per VO of Dr. Blanco Cuff:      Amiodarone 400mg po twice daily- Dispense 30 tablets no refills. EKG visit on 3/29/2021. Per VO of Dr. Williams Swenson: 3/8/2021    Future Appointments   Date Time Provider Darron Vanegas   3/29/2021  1:00 PM MORIAH Gaines BS AMB   8/16/2021  3:00 PM Angela Cuello MD [facility] BS AMB       Requested Prescriptions     Pending Prescriptions Disp Refills    amiodarone (PACERONE) 400 mg tablet 30 Tab 0     Sig: Take 1 Tab by mouth daily.

## 2021-03-23 NOTE — TELEPHONE ENCOUNTER
Pharmacist from [facility] E Yevgeniy Simon called to confirm script on Amiodarone. She stated that Amio 400 mg is brand only. Confirmed that can take 2x 200 mg tablets of generic. Per Dr. Zandra Caldwell, \"Will start him on Amiodarone 400mg po BID for 5 days and recheck EKG and if he is in SR at that time then we will stop Amio at that time. \"    Redid script to show changes.

## 2021-03-23 NOTE — PROGRESS NOTES
Office Follow-up    NAME: Juanis Pang   :  1950  MRM:  [id number]    Date:  3/23/2021            Assessment:     Problem List  Date Reviewed: 2020          Codes Class Noted    Severe obesity ([address] ICD-10-CM: E66.01  ICD-9-CM: 278.01  9/10/2019        Neurogenic claudication due to lumbar spinal stenosis ICD-10-CM: M48.062  ICD-9-CM: 724.03  2017        Prediabetes (Chronic) ICD-10-CM: R73.03  ICD-9-CM: 790.29  10/4/2011        HTN (hypertension) ICD-10-CM: I10  ICD-9-CM: 401.9  10/4/2011        Mixed hyperlipidemia ICD-10-CM: S07.8  ICD-9-CM: 272.2  2011    Overview Addendum 2015  9:53 AM by Terry Dorantes MD     Lipids 10/5/10 -   HDL 39  LDLp- 2157, LDL-C 102   Lipids 10/5/10 -   HDL 34   Lipids 10/16/10 -  LDL 31 HDL 33 ,              CAD (coronary artery disease) (Chronic) ICD-10-CM: I25.10  ICD-9-CM: 414.00  2011    Overview Addendum 2011  8:33 AM by Marva Avila MI 2004  CATH:  2004- 2v CAD. LAD prox 40%, OM 80-90%, %  - ------PCI RCA with  stent  --------Staged PCI OM Aug 2004  AP 2005  CATH: 2005- new prox circ subtotal occlusion s/p PCI x 2 stents (Cypher), patent OM and RCA stents, EF 45% Iris Haile @ [facility])  CATH: 11: LVEDP 23, LVG EF 50%, moderate inferior HK, LM normal, LAD calcified prox 60%, circumflex prox stent patent, OM 1 prox stent patent, RCA long prox/mid stent with 99% in-stent restenosis, T2 flow. Distal short stent probably patent but slow filling hampers assessment  ------PCI of RCA with 3.0 x 18 mm Xience (mid) and 2.75 x 15 Xience (distal)                      Plan:     1. Atrial fibrillation: He is in Afib today. Chads vas score is 3. He does not want to take anticoagulation due to cost of Eliquis as well as for the long term nature of the therapy. In past we offered Coumadin but not interested. Continue metoprolol.  Cannot give Flecainide due to stress test showing infarct with mild periinfarct ischemia in 2017. Will start him on Amiodarone 400mg po BID for 5 days and recheck EKG and if he is in SR at that time then we will stop Amio at that time. 2. CAD: This is stable. Continue current medications. There is residual LAD 60% lesion is stable without any ischemia on stress test last noted in 2017   3. Hypertension: Blood pressure is controlled. Continue Toprol-XL. 4. Dyslipidemia: Unable to take statins because of myalgias. Dietary control of cholesterol. FLP is follow-up primary care physician. 5. Sleep apnea: Uses dental device. 6. See Dr. Nav Hoffmann in 6 months. He got   this year. Subjective:     Kin Kelly, a 70y.o. year-old who presents for followup. He felt he went into Afib over the weekend and his HR has been up and down. He has CAD, Afib s/p DCCV IN 2019. No chest pain, SOB, palpitations, dizziness, presyncope or syncope. EKG in my office today demonstrated Afib, nonspecific ST changes. Exam:     Physical Exam:  Visit Vitals  /86 (BP 1 Location: Left upper arm, BP Patient Position: Sitting, BP Cuff Size: Large adult)   Pulse 88   Ht 5' 9\" (1.753 m)   Wt 241 lb (109.3 kg)   SpO2 100%   BMI 35.59 kg/m²     General appearance - alert, well appearing, and in no distress  Mental status - affect appropriate to mood  Eyes - sclera anicteric, moist mucous membranes  Neck - supple, no significant adenopathy    Medications:     Current Outpatient Medications   Medication Sig    metoprolol succinate (TOPROL-XL) 100 mg tablet TAKE ONE TABLET BY MOUTH DAILY    aspirin delayed-release 81 mg tablet Take 81 mg by mouth two (2) times a day.  lisinopril (PRINIVIL, ZESTRIL) 10 mg tablet Take 1 Tab by mouth daily.  ergocalciferol (ERGOCALCIFEROL) 50,000 unit capsule TAKE 1 CAPSULE BY MOUTH 3 TIMES A WEEK (Patient taking differently: Take 50,000 Units by mouth.  Twice a week)    SILDENAFIL CITRATE (VIAGRA PO) Take 20 mg by mouth as needed.  naproxen (NAPROSYN) 250 mg tablet Take 250 mg by mouth as needed.  multivitamin (ONE A DAY) tablet Take 1 Tab by mouth daily. No current facility-administered medications for this visit. Diagnostic Data Review:       4/3/17 Spect lexiscan: LVEF 55%; small area of mod intensity basal to mid inferior wall infarct with mild vesna-infarct ischemia. Stress test cardiolite:5/4/11- basal and mid inferior ischemia, EF 56%    Cardiac catheterization:9/29/2011- LVEDP 23, LVG EF 50%, moderate inferior HK, LM normal, LAD calcified prox 60%, circumflex prox stent patent, OM 1 prox stent patent, RCA long prox/mid stent with 99% in-stent restenosis, T2 flow. Distal short stent probably patent but slow filling hampers assessment   Stent insertion:9/29/11- PCI of RCA with 3.0 x 18 mm Xience (mid) and 2.75 x 15 Xience (distal)      Lab Review:     Lab Results   Component Value Date/Time    Cholesterol, total 163 09/30/2011 06:10 AM    Cholesterol, Total 159 05/22/2017 12:00 AM    HDL Cholesterol 43 09/30/2011 06:10 AM    LDL,Direct 45 10/05/2010 08:23 AM    LDL, calculated 84.2 09/30/2011 06:10 AM    Triglyceride 179 (H) 09/30/2011 06:10 AM    CHOL/HDL Ratio 3.8 09/30/2011 06:10 AM     Lab Results   Component Value Date/Time    Creatinine 0.94 12/16/2019 10:15 AM     Lab Results   Component Value Date/Time    BUN 11 12/16/2019 10:15 AM     Lab Results   Component Value Date/Time    Potassium 4.3 12/16/2019 10:15 AM     Lab Results   Component Value Date/Time    Hemoglobin A1c 6.3 (H) 10/16/2010 06:37 AM    Hemoglobin A1c, External 6.2 02/21/2015     Lab Results   Component Value Date/Time    HGB 14.5 11/21/2013 05:30 PM     Lab Results   Component Value Date/Time    PLATELET 064 05/26/5276 05:30 PM     No results for input(s): CPK, CKMB, TROIQ in the last 72 hours. No lab exists for component: CKQMB, CPKMB             ___________________________________________________    Hayder Mike Kiana Waterman MD, [facility]

## 2021-03-24 RX ORDER — AMIODARONE HYDROCHLORIDE 200 MG/1
TABLET ORAL
Qty: 20 TAB | Refills: 0 | OUTPATIENT
Start: 2021-03-24

## 2021-03-24 NOTE — TELEPHONE ENCOUNTER
Per VO of Dr. Teofilo Hickey: 3/23/2021    Future Appointments   Date Time Provider Darron Vanegas   3/29/2021  1:00 PM Tegan Rehman, [facility] AMB   8/16/2021  3:00 PM Rand Cuello MD [facility] BS AMB       Requested Prescriptions     Pending Prescriptions Disp Refills    amiodarone (CORDARONE) 200 mg tablet [Pharmacy Med Name: AMIODARONE  MG TABLET] 20 Tab 0     Sig: TAKE TWO TABLETS BY MOUTH TWICE A DAY FOR 5 DAYS THEN REPEAT EKG AND RE-EVALUATE AT THAT TIME.  APPOINTMENT ON 3/29/21 MUST BE KEPT

## 2021-03-29 ENCOUNTER — OFFICE VISIT (OUTPATIENT)
Dept: CARDIOLOGY CLINIC | Age: 71
End: 2021-03-29
Payer: MEDICARE

## 2021-03-29 VITALS — BODY MASS INDEX: 35.99 KG/M2 | HEIGHT: 69 IN | OXYGEN SATURATION: 98 % | WEIGHT: 243 LBS

## 2021-03-29 DIAGNOSIS — I48.0 PAF (PAROXYSMAL ATRIAL FIBRILLATION) (HCC): Primary | ICD-10-CM

## 2021-03-29 DIAGNOSIS — Z01.812 PRE-PROCEDURAL LABORATORY EXAMINATIONS: ICD-10-CM

## 2021-03-29 DIAGNOSIS — I10 ESSENTIAL HYPERTENSION: ICD-10-CM

## 2021-03-29 PROCEDURE — G8756 NO BP MEASURE DOC: HCPCS | Performed by: NURSE PRACTITIONER

## 2021-03-29 PROCEDURE — G8432 DEP SCR NOT DOC, RNG: HCPCS | Performed by: NURSE PRACTITIONER

## 2021-03-29 PROCEDURE — G8536 NO DOC ELDER MAL SCRN: HCPCS | Performed by: NURSE PRACTITIONER

## 2021-03-29 PROCEDURE — 93005 ELECTROCARDIOGRAM TRACING: CPT | Performed by: NURSE PRACTITIONER

## 2021-03-29 PROCEDURE — G8417 CALC BMI ABV UP PARAM F/U: HCPCS | Performed by: NURSE PRACTITIONER

## 2021-03-29 PROCEDURE — 3017F COLORECTAL CA SCREEN DOC REV: CPT | Performed by: NURSE PRACTITIONER

## 2021-03-29 PROCEDURE — 1101F PT FALLS ASSESS-DOCD LE1/YR: CPT | Performed by: NURSE PRACTITIONER

## 2021-03-29 PROCEDURE — G0463 HOSPITAL OUTPT CLINIC VISIT: HCPCS | Performed by: NURSE PRACTITIONER

## 2021-03-29 PROCEDURE — 93010 ELECTROCARDIOGRAM REPORT: CPT | Performed by: NURSE PRACTITIONER

## 2021-03-29 PROCEDURE — 99214 OFFICE O/P EST MOD 30 MIN: CPT | Performed by: NURSE PRACTITIONER

## 2021-03-29 PROCEDURE — G8427 DOCREV CUR MEDS BY ELIG CLIN: HCPCS | Performed by: NURSE PRACTITIONER

## 2021-03-29 RX ORDER — AMIODARONE HYDROCHLORIDE 200 MG/1
200 TABLET ORAL DAILY
Qty: 30 TAB | Refills: 1 | Status: SHIPPED | OUTPATIENT
Start: 2021-03-29 | End: 2021-03-29 | Stop reason: SDUPTHER

## 2021-03-29 RX ORDER — AMIODARONE HYDROCHLORIDE 200 MG/1
200 TABLET ORAL DAILY
Qty: 30 TAB | Refills: 1 | Status: SHIPPED | OUTPATIENT
Start: 2021-03-29 | End: 2021-04-26 | Stop reason: SDUPTHER

## 2021-03-29 NOTE — PROGRESS NOTES
Office Follow-up    NAME:  Gloria Sutton   :  1950  MRM:  [id number]    Date:  3/29/2021     Last seen by Dr. Amber La 1 week ago following onset of AF. Started on Amiodarone 400 mg BID. Here today for further evaluation. Assessment:     Problem List  Date Reviewed: 3/29/2021          Codes Class Noted    PAF (paroxysmal atrial fibrillation) ([facility] ICD-10-CM: I48.0  ICD-9-CM: 427.31  3/29/2021        Severe obesity ([facility] [address] [address] ICD-10-CM: E66.01  ICD-9-CM: 278.01  9/10/2019        Neurogenic claudication due to lumbar spinal stenosis ICD-10-CM: M48.062  ICD-9-CM: 724.03  2017        Prediabetes (Chronic) ICD-10-CM: R73.03  ICD-9-CM: 790.29  10/4/2011        HTN (hypertension) ICD-10-CM: I10  ICD-9-CM: 401.9  10/4/2011        Mixed hyperlipidemia ICD-10-CM: P97.1  ICD-9-CM: 272.2  2011    Overview Addendum 2015  9:53 AM by Naman Garner MD     Lipids 10/5/10 -   HDL 39  LDLp- 2157, LDL-C 102   Lipids 10/5/10 -   HDL 34   Lipids 10/16/10 -  LDL 31 HDL 33 ,              CAD (coronary artery disease) (Chronic) ICD-10-CM: I25.10  ICD-9-CM: 414.00  2011    Overview Addendum 2011  8:33 AM by Carlos Ibrahim     Inferior MI 2004  CATH:  2004- 2v CAD. LAD prox 40%, OM 80-90%, %  - ------PCI RCA with  stent  --------Staged PCI OM Aug 2004  AP 2005  CATH: 2005- new prox circ subtotal occlusion s/p PCI x 2 stents (Cypher), patent OM and RCA stents, EF 45% Rio Soliman @ [facility])  CATH: 11: LVEDP 23, LVG EF 50%, moderate inferior HK, LM normal, LAD calcified prox 60%, circumflex prox stent patent, OM 1 prox stent patent, RCA long prox/mid stent with 99% in-stent restenosis, T2 flow. Distal short stent probably patent but slow filling hampers assessment  ------PCI of RCA with 3.0 x 18 mm Xience (mid) and 2.75 x 15 Xience (distal)                      Plan:     1. Persistent Atrial fibrillation - by exam and EKG today.   Rate controlled. No s/sxs of RVR. No overt HF sxs; Cx BRAYDEN is unchanged. CHADSVASc score is 3, but he has declined anticoagulation (2/2 cost and long term side effects). Last persistent episode of AF was in 2019. This episode required Cardioversion as well. - Schedule MOE +/- DCCv to restore SR. Update BMP, Mg and COVID screen prior to proceeding. Pre-procedure instructions per RN.    - REDUCE Amiodarone to 200 mg daily. - Continue Toprol  mg daily. Unable to prescribe Flecainide due to stress test showing infarct with mild periinfarct ischemia in 2017. 2. CAD - s/p PCI to RCA x 2. Residual LAD 60% lesion is stable without any ischemia on stress test last noted in 2017. No exertional angina at a good functional capacity. - Continue ASA and BB     3. Hypertension - normotensive on current medication regimen.     - Continue current medication   - Low sodium diet    4. Dyslipidemia - : Unable to take statins because of myalgias. Dietary control of cholesterol. FLP is follow-up primary care physician. 5. Sleep apnea: Uses dental device. Subjective:     Mr. Tayler Hester denies any significant interval change. He continues to feel intermittent palpitations. These episodes cause a sense of anxiety. He denies any tachycardia or lightheadedness. No syncope or near syncope. His activity has been reduced due to mild exertional dyspnea. He denies any cough or wheezing. No exertional chest pain. He notes chronic ankle edema, right greater than left. PCP has recommended MARTIN hose in the past, but he prefers not to use them. His appetite is intact. No post prandial nausea. No abdominal pain or distention. He reports that he has discontinued all caffeine. Sleeping well. No orthopnea or PND. He reports compliance with Amiodarone as prescribed.         Exam:     Visit Vitals  Ht 5' 9\" (1.753 m)   Wt 243 lb (110.2 kg)   SpO2 98%   BMI 35.88 kg/m²      General appearance - alert, well appearing, and in no distress  Mental status - affect appropriate to mood  Eyes - sclera anicteric, moist mucous membranes  Neck - supple, no significant adenopathy  Cardiac - Irregular, no murmur, rubs or gallop. Trace ankle edema bilaterally. 2+ pulses. No JVD  Lungs - CTA bilaterally     Medications:     Current Outpatient Medications   Medication Sig    amiodarone (CORDARONE) 200 mg tablet Take 1 Tab by mouth daily.  metoprolol succinate (TOPROL-XL) 100 mg tablet TAKE ONE TABLET BY MOUTH DAILY    aspirin delayed-release 81 mg tablet Take 81 mg by mouth two (2) times a day.  lisinopril (PRINIVIL, ZESTRIL) 10 mg tablet Take 1 Tab by mouth daily.  ergocalciferol (ERGOCALCIFEROL) 50,000 unit capsule TAKE 1 CAPSULE BY MOUTH 3 TIMES A WEEK (Patient taking differently: Take 50,000 Units by mouth. Twice a week)    SILDENAFIL CITRATE (VIAGRA PO) Take 20 mg by mouth as needed.  naproxen (NAPROSYN) 250 mg tablet Take 250 mg by mouth as needed.  multivitamin (ONE A DAY) tablet Take 1 Tab by mouth daily. No current facility-administered medications for this visit. Diagnostic Data Review:       4/3/17 Spect lexiscan: LVEF 55%; small area of mod intensity basal to mid inferior wall infarct with mild vesna-infarct ischemia. Stress test cardiolite:5/4/11- basal and mid inferior ischemia, EF 56%    Cardiac catheterization:9/29/2011- LVEDP 23, LVG EF 50%, moderate inferior HK, LM normal, LAD calcified prox 60%, circumflex prox stent patent, OM 1 prox stent patent, RCA long prox/mid stent with 99% in-stent restenosis, T2 flow.  Distal short stent probably patent but slow filling hampers assessment   Stent insertion:9/29/11- PCI of RCA with 3.0 x 18 mm Xience (mid) and 2.75 x 15 Xience (distal)      Lab Review:     Lab Results   Component Value Date/Time    Cholesterol, total 163 09/30/2011 06:10 AM    Cholesterol, Total 159 05/22/2017 12:00 AM    HDL Cholesterol 43 09/30/2011 06:10 AM LDL,Direct 45 10/05/2010 08:23 AM    LDL, calculated 84.2 09/30/2011 06:10 AM    Triglyceride 179 (H) 09/30/2011 06:10 AM    CHOL/HDL Ratio 3.8 09/30/2011 06:10 AM     Lab Results   Component Value Date/Time    Creatinine 0.94 12/16/2019 10:15 AM     Lab Results   Component Value Date/Time    BUN 11 12/16/2019 10:15 AM     Lab Results   Component Value Date/Time    Potassium 4.3 12/16/2019 10:15 AM     Lab Results   Component Value Date/Time    Hemoglobin A1c 6.3 (H) 10/16/2010 06:37 AM    Hemoglobin A1c, External 6.2 02/21/2015     Lab Results   Component Value Date/Time    HGB 14.5 11/21/2013 05:30 PM     Lab Results   Component Value Date/Time    PLATELET 215 98/16/9256 05:30 PM     No results for input(s): CPK, CKMB, TROIQ in the last 72 hours.     No lab exists for component: CKQMB, [address], [place]

## 2021-03-29 NOTE — H&P (VIEW-ONLY)
Office Follow-up NAME:  Kelvin Maza :  1950 MRM:  [id number] Date:  3/29/2021 Last seen by Dr. Morgan Navas 1 week ago following onset of AF. Started on Amiodarone 400 mg BID. Here today for further evaluation. Assessment:  
 
Problem List  Date Reviewed: 3/29/2021 Codes Class Noted PAF (paroxysmal atrial fibrillation) (HCC) ICD-10-CM: I48.0 ICD-9-CM: 427.31  3/29/2021 Severe obesity ([address] ICD-10-CM: E66.01 
ICD-9-CM: 278.01  9/10/2019 Neurogenic claudication due to lumbar spinal stenosis ICD-10-CM: Y56.209 
ICD-9-CM: 724.03  2017 Prediabetes (Chronic) ICD-10-CM: R73.03 
ICD-9-CM: 790.29  10/4/2011 HTN (hypertension) ICD-10-CM: I10 
ICD-9-CM: 401.9  10/4/2011 Mixed hyperlipidemia ICD-10-CM: E78.2 ICD-9-CM: 272.2  2011 Overview Addendum 2015  9:53 AM by Franck Jo MD  
  Lipids 10/5/10 -   HDL 39  LDLp- 2157, LDL-C 102 Lipids 10/5/10 -   HDL 34  Lipids 10/16/10 -  LDL 31 HDL 33 ,  CAD (coronary artery disease) (Chronic) ICD-10-CM: I25.10 ICD-9-CM: 414.00  2011 Overview Addendum 2011  8:33 AM by Michael Asif Inferior MI 2004 CATH:  2004- 2v CAD. LAD prox 40%, OM 80-90%, % - ------PCI RCA with  stent 
--------Staged PCI OM Aug 2004 AP 2005 CATH: 2005- new prox circ subtotal occlusion s/p PCI x 2 stents (Cypher), patent OM and RCA stents, EF 45% Maryam Zhao @ [facility]) CATH: 11: LVEDP 23, LVG EF 50%, moderate inferior HK, LM normal, LAD calcified prox 60%, circumflex prox stent patent, OM 1 prox stent patent, RCA long prox/mid stent with 99% in-stent restenosis, T2 flow. Distal short stent probably patent but slow filling hampers assessment 
------PCI of RCA with 3.0 x 18 mm Xience (mid) and 2.75 x 15 Xience (distal) Plan: 1. Persistent Atrial fibrillation - by exam and EKG today.   Rate controlled. No s/sxs of RVR. No overt HF sxs; Cx BRAYDEN is unchanged. CHADSVASc score is 3, but he has declined anticoagulation (2/2 cost and long term side effects). Last persistent episode of AF was in 2019. This episode required Cardioversion as well. - Schedule MOE +/- DCCv to restore SR. Update BMP, Mg and COVID screen prior to proceeding. Pre-procedure instructions per RN.  
 - REDUCE Amiodarone to 200 mg daily. - Continue Toprol  mg daily. Unable to prescribe Flecainide due to stress test showing infarct with mild periinfarct ischemia in 2017. 2. CAD - s/p PCI to RCA x 2. Residual LAD 60% lesion is stable without any ischemia on stress test last noted in 2017. No exertional angina at a good functional capacity. - Continue ASA and BB 3. Hypertension - normotensive on current medication regimen.   
 - Continue current medication - Low sodium diet 4. Dyslipidemia - : Unable to take statins because of myalgias. Dietary control of cholesterol. FLP is follow-up primary care physician. 5. Sleep apnea: Uses dental device. Subjective:  
 
Mr. Inna Canales denies any significant interval change. He continues to feel intermittent palpitations. These episodes cause a sense of anxiety. He denies any tachycardia or lightheadedness. No syncope or near syncope. His activity has been reduced due to mild exertional dyspnea. He denies any cough or wheezing. No exertional chest pain. He notes chronic ankle edema, right greater than left. PCP has recommended MARTIN hose in the past, but he prefers not to use them. His appetite is intact. No post prandial nausea. No abdominal pain or distention. He reports that he has discontinued all caffeine. Sleeping well. No orthopnea or PND. He reports compliance with Amiodarone as prescribed. Exam:  
 
Visit Vitals Ht 5' 9\" (1.753 m) Wt 243 lb (110.2 kg) SpO2 98% BMI 35.88 kg/m² General appearance - alert, well appearing, and in no distress Mental status - affect appropriate to mood Eyes - sclera anicteric, moist mucous membranes Neck - supple, no significant adenopathy Cardiac - Irregular, no murmur, rubs or gallop. Trace ankle edema bilaterally. 2+ pulses. No JVD Lungs - CTA bilaterally Medications:  
 
Current Outpatient Medications Medication Sig  
 amiodarone (CORDARONE) 200 mg tablet Take 1 Tab by mouth daily.  metoprolol succinate (TOPROL-XL) 100 mg tablet TAKE ONE TABLET BY MOUTH DAILY  aspirin delayed-release 81 mg tablet Take 81 mg by mouth two (2) times a day.  lisinopril (PRINIVIL, ZESTRIL) 10 mg tablet Take 1 Tab by mouth daily.  ergocalciferol (ERGOCALCIFEROL) 50,000 unit capsule TAKE 1 CAPSULE BY MOUTH 3 TIMES A WEEK (Patient taking differently: Take 50,000 Units by mouth. Twice a week)  SILDENAFIL CITRATE (VIAGRA PO) Take 20 mg by mouth as needed.  naproxen (NAPROSYN) 250 mg tablet Take 250 mg by mouth as needed.  multivitamin (ONE A DAY) tablet Take 1 Tab by mouth daily. No current facility-administered medications for this visit. Diagnostic Data Review:  
 
 
4/3/17 Spect lexiscan: LVEF 55%; small area of mod intensity basal to mid inferior wall infarct with mild vesna-infarct ischemia. Stress test cardiolite:5/4/11- basal and mid inferior ischemia, EF 56% Cardiac catheterization:9/29/2011- LVEDP 23, LVG EF 50%, moderate inferior HK, LM normal, LAD calcified prox 60%, circumflex prox stent patent, OM 1 prox stent patent, RCA long prox/mid stent with 99% in-stent restenosis, T2 flow. Distal short stent probably patent but slow filling hampers assessment Stent insertion:9/29/11- PCI of RCA with 3.0 x 18 mm Xience (mid) and 2.75 x 15 Xience (distal) Lab Review:  
 
Lab Results Component Value Date/Time  Cholesterol, total 163 09/30/2011 06:10 AM  
 Cholesterol, Total 159 05/22/2017 12:00 AM  
 HDL Cholesterol 43 09/30/2011 06:10 AM LDL,Direct 45 10/05/2010 08:23 AM  
 LDL, calculated 84.2 09/30/2011 06:10 AM  
 Triglyceride 179 (H) 09/30/2011 06:10 AM  
 CHOL/HDL Ratio 3.8 09/30/2011 06:10 AM  
 
Lab Results Component Value Date/Time Creatinine 0.94 12/16/2019 10:15 AM  
 
Lab Results Component Value Date/Time BUN 11 12/16/2019 10:15 AM  
 
Lab Results Component Value Date/Time Potassium 4.3 12/16/2019 10:15 AM  
 
Lab Results Component Value Date/Time Hemoglobin A1c 6.3 (H) 10/16/2010 06:37 AM  
 Hemoglobin A1c, External 6.2 02/21/2015 Lab Results Component Value Date/Time HGB 14.5 11/21/2013 05:30 PM  
 
Lab Results Component Value Date/Time PLATELET 287 95/06/0590 05:30 PM  
 
No results for input(s): CPK, CKMB, TROIQ in the last 72 hours. No lab exists for component: CKQMB, CPKMB Diandra Burgos NP

## 2021-03-29 NOTE — PROGRESS NOTES
Per VO of Dr. Dani Peck: 3/23/2021    Future Appointments   Date Time Provider Darron Romina   8/16/2021  3:00 PM Diandra Cuello MD [facility] AMB       Requested Prescriptions     Pending Prescriptions Disp Refills    amiodarone (CORDARONE) 200 mg tablet 30 Tab 1     Sig: Take 1 Tab by mouth daily. Signed Prescriptions Disp Refills    amiodarone (CORDARONE) 200 mg tablet 30 Tab 1     Sig: Take 1 Tab by mouth daily.

## 2021-03-29 NOTE — PROGRESS NOTES
Chief Complaint   Patient presents with    Follow-up     1 week;  Amio therapy    Cardiac Testing     Visit Vitals  Ht 5' 9\" (1.753 m)   Wt 243 lb (110.2 kg)   SpO2 98%   BMI 35.88 kg/m²     Chest pain denied   SOB after mild exertion; it was an effort coming in today (walking from car)  Palpitations denied   Swelling in hands/feet denied   Dizziness denied   Recent hospital stays denied   Refills denied

## 2021-03-30 ENCOUNTER — HOSPITAL ENCOUNTER (OUTPATIENT)
Dept: LAB | Age: 71
Discharge: HOME | End: 2021-03-30
Payer: MEDICARE

## 2021-03-30 ENCOUNTER — TRANSCRIBE ORDER (OUTPATIENT)
Dept: REGISTRATION | Age: 71
End: 2021-03-30

## 2021-03-30 DIAGNOSIS — I48.0 PAROXYSMAL ATRIAL FIBRILLATION (HCC): Primary | ICD-10-CM

## 2021-03-30 DIAGNOSIS — Z01.812 PRE-PROCEDURAL LABORATORY EXAMINATIONS: Primary | ICD-10-CM

## 2021-03-30 DIAGNOSIS — Z01.812 PRE-PROCEDURAL LABORATORY EXAMINATIONS: ICD-10-CM

## 2021-03-30 PROCEDURE — U0003 INFECTIOUS AGENT DETECTION BY NUCLEIC ACID (DNA OR RNA); SEVERE ACUTE RESPIRATORY SYNDROME CORONAVIRUS 2 (SARS-COV-2) (CORONAVIRUS DISEASE [COVID-19]), AMPLIFIED PROBE TECHNIQUE, MAKING USE OF HIGH THROUGHPUT TECHNOLOGIES AS DESCRIBED BY CMS-2020-01-R: HCPCS

## 2021-03-30 RX ORDER — SODIUM CHLORIDE 0.9 % (FLUSH) 0.9 %
5-40 SYRINGE (ML) INJECTION EVERY 8 HOURS
Status: CANCELLED | OUTPATIENT
Start: 2021-04-01

## 2021-03-30 RX ORDER — SODIUM CHLORIDE 0.9 % (FLUSH) 0.9 %
5-40 SYRINGE (ML) INJECTION AS NEEDED
Status: CANCELLED | OUTPATIENT
Start: 2021-04-01

## 2021-03-30 NOTE — PROGRESS NOTES
Spoke with pt concerning MOE/DCCV procedure. (Pt IDx2). Instructions given to pt per VO of Dr. Suzy Tavera. Pt NPO after midnight; take all meds with sip of water in AM have someone available to drive pt to and from procedure; pack a bag in case an overnight stay is warranted. The pt has a MOE/DCCV scheduled for 1200 on Thursday April 1, 2021. Pt advised to arrive 1.5hrs prior to procedure for prep. Labs Mg, BMP. Pt expressed understanding. He may have some difficulty arranging a . Will let us know if he needs to reschedule. Opportunities for questions, clarifications, and concerns provided.

## 2021-03-31 LAB — SARS-COV-2, COV2NT: NOT DETECTED

## 2021-04-01 ENCOUNTER — HOSPITAL ENCOUNTER (OUTPATIENT)
Dept: NON INVASIVE DIAGNOSTICS | Age: 71
Discharge: HOME | End: 2021-04-01
Attending: NURSE PRACTITIONER | Admitting: INTERNAL MEDICINE
Payer: MEDICARE

## 2021-04-01 VITALS
DIASTOLIC BLOOD PRESSURE: 45 MMHG | HEIGHT: 69 IN | SYSTOLIC BLOOD PRESSURE: 124 MMHG | WEIGHT: 243 LBS | BODY MASS INDEX: 35.99 KG/M2 | OXYGEN SATURATION: 95 % | RESPIRATION RATE: 16 BRPM | HEART RATE: 55 BPM

## 2021-04-01 DIAGNOSIS — I48.0 PAROXYSMAL ATRIAL FIBRILLATION (HCC): ICD-10-CM

## 2021-04-01 DIAGNOSIS — I48.0 PAF (PAROXYSMAL ATRIAL FIBRILLATION) (HCC): ICD-10-CM

## 2021-04-01 LAB
ANION GAP SERPL CALC-SCNC: 4 MMOL/L (ref 5–15)
BUN SERPL-MCNC: 18 MG/DL (ref 6–20)
BUN/CREAT SERPL: 19 (ref 12–20)
CALCIUM SERPL-MCNC: 8.8 MG/DL (ref 8.5–10.1)
CHLORIDE SERPL-SCNC: 106 MMOL/L (ref 97–108)
CO2 SERPL-SCNC: 27 MMOL/L (ref 21–32)
CREAT SERPL-MCNC: 0.94 MG/DL (ref 0.7–1.3)
GLUCOSE SERPL-MCNC: 121 MG/DL (ref 65–100)
MAGNESIUM SERPL-MCNC: 2.1 MG/DL (ref 1.6–2.4)
POTASSIUM SERPL-SCNC: 4.3 MMOL/L (ref 3.5–5.1)
SODIUM SERPL-SCNC: 137 MMOL/L (ref 136–145)

## 2021-04-01 PROCEDURE — 74011000250 HC RX REV CODE- 250: Performed by: INTERNAL MEDICINE

## 2021-04-01 PROCEDURE — 99152 MOD SED SAME PHYS/QHP 5/>YRS: CPT | Performed by: INTERNAL MEDICINE

## 2021-04-01 PROCEDURE — 93312 ECHO TRANSESOPHAGEAL: CPT

## 2021-04-01 PROCEDURE — 36415 COLL VENOUS BLD VENIPUNCTURE: CPT

## 2021-04-01 PROCEDURE — 83735 ASSAY OF MAGNESIUM: CPT

## 2021-04-01 PROCEDURE — 74011250636 HC RX REV CODE- 250/636: Performed by: INTERNAL MEDICINE

## 2021-04-01 PROCEDURE — 99152 MOD SED SAME PHYS/QHP 5/>YRS: CPT

## 2021-04-01 PROCEDURE — 80048 BASIC METABOLIC PNL TOTAL CA: CPT

## 2021-04-01 PROCEDURE — 77030018729 HC ELECTRD DEFIB PAD CARD -B

## 2021-04-01 RX ORDER — MIDAZOLAM HYDROCHLORIDE 1 MG/ML
1-10 INJECTION, SOLUTION INTRAMUSCULAR; INTRAVENOUS
Status: DISCONTINUED | OUTPATIENT
Start: 2021-04-01 | End: 2021-04-01 | Stop reason: HOSPADM

## 2021-04-01 RX ORDER — LIDOCAINE HYDROCHLORIDE 20 MG/ML
15 SOLUTION OROPHARYNGEAL
Status: DISCONTINUED | OUTPATIENT
Start: 2021-04-01 | End: 2021-04-01 | Stop reason: HOSPADM

## 2021-04-01 RX ORDER — SODIUM CHLORIDE 9 MG/ML
10 INJECTION INTRAMUSCULAR; INTRAVENOUS; SUBCUTANEOUS
Status: DISCONTINUED | OUTPATIENT
Start: 2021-04-01 | End: 2021-04-01 | Stop reason: HOSPADM

## 2021-04-01 RX ORDER — LIDOCAINE 50 MG/G
OINTMENT TOPICAL
Status: DISCONTINUED | OUTPATIENT
Start: 2021-04-01 | End: 2021-04-01 | Stop reason: HOSPADM

## 2021-04-01 RX ORDER — FENTANYL CITRATE 50 UG/ML
25-200 INJECTION, SOLUTION INTRAMUSCULAR; INTRAVENOUS
Status: DISCONTINUED | OUTPATIENT
Start: 2021-04-01 | End: 2021-04-01 | Stop reason: HOSPADM

## 2021-04-01 RX ORDER — SODIUM CHLORIDE 0.9 % (FLUSH) 0.9 %
5-40 SYRINGE (ML) INJECTION EVERY 8 HOURS
Status: DISCONTINUED | OUTPATIENT
Start: 2021-04-01 | End: 2021-04-01 | Stop reason: HOSPADM

## 2021-04-01 RX ORDER — SODIUM CHLORIDE 0.9 % (FLUSH) 0.9 %
5-40 SYRINGE (ML) INJECTION AS NEEDED
Status: DISCONTINUED | OUTPATIENT
Start: 2021-04-01 | End: 2021-04-01 | Stop reason: HOSPADM

## 2021-04-01 RX ADMIN — FENTANYL CITRATE 25 MCG: 50 INJECTION, SOLUTION INTRAMUSCULAR; INTRAVENOUS at 12:21

## 2021-04-01 RX ADMIN — MIDAZOLAM 1 MG: 1 INJECTION INTRAMUSCULAR; INTRAVENOUS at 12:24

## 2021-04-01 RX ADMIN — BENZOCAINE: 200 SPRAY DENTAL; ORAL; PERIODONTAL at 12:12

## 2021-04-01 RX ADMIN — MIDAZOLAM 2 MG: 1 INJECTION INTRAMUSCULAR; INTRAVENOUS at 12:21

## 2021-04-01 RX ADMIN — LIDOCAINE HYDROCHLORIDE 15 ML: 20 SOLUTION ORAL; TOPICAL at 11:53

## 2021-04-01 RX ADMIN — LIDOCAINE: 50 OINTMENT TOPICAL at 11:57

## 2021-04-01 RX ADMIN — MIDAZOLAM 1 MG: 1 INJECTION INTRAMUSCULAR; INTRAVENOUS at 12:28

## 2021-04-01 RX ADMIN — MIDAZOLAM 1 MG: 1 INJECTION INTRAMUSCULAR; INTRAVENOUS at 12:30

## 2021-04-01 RX ADMIN — FENTANYL CITRATE 25 MCG: 50 INJECTION, SOLUTION INTRAMUSCULAR; INTRAVENOUS at 12:28

## 2021-04-01 NOTE — PROGRESS NOTES
Patient arrived to Non-Invasive Cardiology Lab for Out Patient MOE/CV Procedure. Staff introduced to patient. Patient identifiers verified with Name and Date of Birth. Procedure verified with patient. Consent forms reviewed and signed by patient or authorized representative and verified. Allergies verified. Patient informed of procedure and plan of care. Questions answered with review. Patient on cardiac monitor, non-invasive blood pressure, SPO2 monitor. On RA. Patient is A&Ox3. Patient reports no complaints. Patient on stretcher, in low position, with side rails up. Patient instructed to call for assistance as needed. Valuables left with son, Benjamin Zavaleta. Family in waiting room.

## 2021-04-01 NOTE — INTERVAL H&P NOTE
Update History & Physical 
 
The Patient's History and Physical of March 29,  
MOE and DCCV was reviewed with the patient and I examined the patient. There was no change. The surgical site was confirmed by the patient and me. Plan:  The risk, benefits, expected outcome, and alternative to the recommended procedure have been discussed with the patient. Patient understands and wants to proceed with the procedure.  
 
Electronically signed by Sridevi Dasilva MD on 4/1/2021 at 12:04 PM

## 2021-04-01 NOTE — PROGRESS NOTES
Discharge instructions reviewed with patient and son. Allowed adequate time to ask questions, all questions answered. Printed copy of AVS given to patient. All belongings gathered, IV and tele discontinued. Transported via wheelchair to main entrance and into care of family.

## 2021-04-26 RX ORDER — AMIODARONE HYDROCHLORIDE 200 MG/1
200 TABLET ORAL DAILY
Qty: 90 TAB | Refills: 3 | Status: SHIPPED | OUTPATIENT
Start: 2021-04-26 | End: 2021-05-10 | Stop reason: SDUPTHER

## 2021-04-26 NOTE — TELEPHONE ENCOUNTER
Per VO of Dr. Yocasta Plasencia: 4/20/2021    Future Appointments   Date Time Provider Darron Vanegas   5/10/2021  2:40 PM Yessi Cuello MD [facility]   8/16/2021  3:00 PM Yessi Cuello MD [facility] AM[facility]       Requested Prescriptions     Pending Prescriptions Disp Refills    amiodarone (CORDARONE) 200 mg tablet 30 Tab 1     Sig: Take 1 Tab by mouth daily.

## 2021-05-10 ENCOUNTER — OFFICE VISIT (OUTPATIENT)
Dept: CARDIOLOGY CLINIC | Age: 71
End: 2021-05-10
Payer: MEDICARE

## 2021-05-10 VITALS
HEIGHT: 69 IN | WEIGHT: 233 LBS | DIASTOLIC BLOOD PRESSURE: 84 MMHG | BODY MASS INDEX: 34.51 KG/M2 | SYSTOLIC BLOOD PRESSURE: 136 MMHG | OXYGEN SATURATION: 99 % | HEART RATE: 56 BPM

## 2021-05-10 DIAGNOSIS — I48.0 PAF (PAROXYSMAL ATRIAL FIBRILLATION) (HCC): Primary | ICD-10-CM

## 2021-05-10 DIAGNOSIS — I10 ESSENTIAL HYPERTENSION: ICD-10-CM

## 2021-05-10 PROCEDURE — G8752 SYS BP LESS 140: HCPCS | Performed by: INTERNAL MEDICINE

## 2021-05-10 PROCEDURE — G8754 DIAS BP LESS 90: HCPCS | Performed by: INTERNAL MEDICINE

## 2021-05-10 PROCEDURE — 99213 OFFICE O/P EST LOW 20 MIN: CPT | Performed by: INTERNAL MEDICINE

## 2021-05-10 PROCEDURE — G0463 HOSPITAL OUTPT CLINIC VISIT: HCPCS | Performed by: INTERNAL MEDICINE

## 2021-05-10 PROCEDURE — 93010 ELECTROCARDIOGRAM REPORT: CPT | Performed by: INTERNAL MEDICINE

## 2021-05-10 PROCEDURE — 3017F COLORECTAL CA SCREEN DOC REV: CPT | Performed by: INTERNAL MEDICINE

## 2021-05-10 PROCEDURE — 1101F PT FALLS ASSESS-DOCD LE1/YR: CPT | Performed by: INTERNAL MEDICINE

## 2021-05-10 PROCEDURE — G8536 NO DOC ELDER MAL SCRN: HCPCS | Performed by: INTERNAL MEDICINE

## 2021-05-10 PROCEDURE — 93005 ELECTROCARDIOGRAM TRACING: CPT | Performed by: INTERNAL MEDICINE

## 2021-05-10 PROCEDURE — G8417 CALC BMI ABV UP PARAM F/U: HCPCS | Performed by: INTERNAL MEDICINE

## 2021-05-10 PROCEDURE — G8428 CUR MEDS NOT DOCUMENT: HCPCS | Performed by: INTERNAL MEDICINE

## 2021-05-10 PROCEDURE — G8510 SCR DEP NEG, NO PLAN REQD: HCPCS | Performed by: INTERNAL MEDICINE

## 2021-05-10 RX ORDER — AMIODARONE HYDROCHLORIDE 100 MG/1
100 TABLET ORAL DAILY
Qty: 90 TAB | Refills: 3 | Status: SHIPPED
Start: 2021-05-10 | End: 2022-02-14 | Stop reason: ALTCHOICE

## 2021-05-10 NOTE — PROGRESS NOTES
Room 5      Visit Vitals  /84 (BP 1 Location: Left upper arm, BP Patient Position: Sitting)   Pulse (!) 56   Ht 5' 9\" (1.753 m)   Wt 233 lb (105.7 kg)   SpO2 99%   BMI 34.41 kg/m²     Chest pain: no  Shortness of breath: no  Edema: no  Palpitations: no  Dizziness: no    New diagnosis/Surgeries: saw neurosurgeon for muscle issue, rule out clot, had MRI    ER/Hospitalizations: no    Refills: Lisinop 90 days, and amio

## 2021-05-10 NOTE — PROGRESS NOTES
All orders entered per VO of Dr. Sanju Carter:        Reduce the dose of Amiodarone to 100mg po daily. See Dr. Sanju Carter in 3 months. Per VO of Dr. Yocasta Plasencia: 4/20/2021    Future Appointments   Date Time Provider Darron Vanegas   8/10/2021  1:00 PM Yessi Cuello MD [facility]   8/16/2021  3:00 PM Yessi Cuello MD [facility]       Requested Prescriptions     Pending Prescriptions Disp Refills    amiodarone (PACERONE) 100 mg tablet 90 Tab 3     Sig: Take 1 Tab by mouth daily.

## 2021-05-10 NOTE — PROGRESS NOTES
Office Follow-up    NAME: Veronica Martinez   :  1950  MRM:  [id number]    Date:  5/10/2021            Assessment:     Problem List  Date Reviewed: 3/29/2021          Codes Class Noted    PAF (paroxysmal atrial fibrillation) ([facility] ICD-10-CM: I48.0  ICD-9-CM: 427.31  3/29/2021        Severe obesity ([facility] ICD-10-CM: E66.01  ICD-9-CM: 278.01  9/10/2019        Neurogenic claudication due to lumbar spinal stenosis ICD-10-CM: M48.062  ICD-9-CM: 724.03  2017        Prediabetes (Chronic) ICD-10-CM: R73.03  ICD-9-CM: 790.29  10/4/2011        HTN (hypertension) ICD-10-CM: I10  ICD-9-CM: 401.9  10/4/2011        Mixed hyperlipidemia ICD-10-CM: R76.1  ICD-9-CM: 272.2  2011    Overview Addendum 2015  9:53 AM by Bri De Los Santos MD     Lipids 10/5/10 -   HDL 39  LDLp- 2157, LDL-C 102   Lipids 10/5/10 -   HDL 34   Lipids 10/16/10 -  LDL 31 HDL 33 ,              CAD (coronary artery disease) (Chronic) ICD-10-CM: I25.10  ICD-9-CM: 414.00  2011    Overview Addendum 2011  8:33 AM by Armin Prater     Inferior MI 2004  CATH:  2004- 2v CAD. LAD prox 40%, OM 80-90%, %  - ------PCI RCA with  stent  --------Staged PCI OM Aug 2004  AP 2005  CATH: 2005- new prox circ subtotal occlusion s/p PCI x 2 stents (Cypher), patent OM and RCA stents, EF 45% Jeffry [place] @ [facility])  CATH: 11: LVEDP 23, LVG EF 50%, moderate inferior HK, LM normal, LAD calcified prox 60%, circumflex prox stent patent, OM 1 prox stent patent, RCA long prox/mid stent with 99% in-stent restenosis, T2 flow. Distal short stent probably patent but slow filling hampers assessment  ------PCI of RCA with 3.0 x 18 mm Xience (mid) and 2.75 x 15 Xience (distal)                      Plan:     1. Atrial fibrillation status post DCCV 2021, prior DCCV 2019: He is now in sinus rhythm with sinus bradycardia.   We will reduce the dosage of amiodarone to 100 mg p.o. daily along with continue metoprolol  mg p.o. daily. Is not on chronic anticoagulation as he cannot afford Eliquis and in the past we had offered him Coumadin but he is not interested. He is currently on aspirin 81 mg p.o. daily which he will continue. 2. CAD: This is stable. Continue current medications. There is residual LAD 60% lesion is stable without any ischemia on stress test last noted in 2017   3. Hypertension: Blood pressure is controlled. Continue Toprol-XL. 4. Dyslipidemia: Unable to take statins because of myalgias. Dietary control of cholesterol. FLP is follow-up primary care physician. 5. Sleep apnea: Uses dental device. 6. See Dr. David Cheatham in 3 months. He got   in 2019 on May 16th. Subjective:     Dennis Wilson, a 70y.o. year-old who presents for followup. He recently underwent A. fib cardioversion on April 1, 2021. This was his second cardioversion. He converted to sinus rhythm. Since then he has felt better. His last cardioversion was in 2019. Currently has no symptoms of chest pain, shortness of breath, palpitation, achiness or dizziness. He is currently on amiodarone 200 p.o. daily along with metoprolol 100 mg p.o. daily. EKG in our office today demonstrated sinus rhythm with sinus bradycardia with heart rate of 53 bpm with poor R wave progression with left axis deviation. There is IVCD. Exam:     Physical Exam:  Visit Vitals  /84 (BP 1 Location: Left upper arm, BP Patient Position: Sitting)   Pulse (!) 56   Ht 5' 9\" (1.753 m)   Wt 233 lb (105.7 kg)   SpO2 99%   BMI 34.41 kg/m²     General appearance - alert, well appearing, and in no distress  Mental status - affect appropriate to mood  Eyes - sclera anicteric, moist mucous membranes  Neck - supple, no significant adenopathy    Medications:     Current Outpatient Medications   Medication Sig    amiodarone (CORDARONE) 200 mg tablet Take 1 Tab by mouth daily.     metoprolol succinate (TOPROL-XL) 100 mg tablet TAKE ONE TABLET BY MOUTH DAILY    aspirin delayed-release 81 mg tablet Take 81 mg by mouth two (2) times a day.  lisinopril (PRINIVIL, ZESTRIL) 10 mg tablet Take 1 Tab by mouth daily.  ergocalciferol (ERGOCALCIFEROL) 50,000 unit capsule TAKE 1 CAPSULE BY MOUTH 3 TIMES A WEEK (Patient taking differently: Take 50,000 Units by mouth. Twice a week)    SILDENAFIL CITRATE (VIAGRA PO) Take 20 mg by mouth as needed.  naproxen (NAPROSYN) 250 mg tablet Take 250 mg by mouth as needed.  multivitamin (ONE A DAY) tablet Take 1 Tab by mouth daily. No current facility-administered medications for this visit. Diagnostic Data Review:       4/3/17 Spect lexiscan: LVEF 55%; small area of mod intensity basal to mid inferior wall infarct with mild vesna-infarct ischemia. Stress test cardiolite:5/4/11- basal and mid inferior ischemia, EF 56%    Cardiac catheterization:9/29/2011- LVEDP 23, LVG EF 50%, moderate inferior HK, LM normal, LAD calcified prox 60%, circumflex prox stent patent, OM 1 prox stent patent, RCA long prox/mid stent with 99% in-stent restenosis, T2 flow.  Distal short stent probably patent but slow filling hampers assessment   Stent insertion:9/29/11- PCI of RCA with 3.0 x 18 mm Xience (mid) and 2.75 x 15 Xience (distal)      Lab Review:     Lab Results   Component Value Date/Time    Cholesterol, total 163 09/30/2011 06:10 AM    Cholesterol, Total 159 05/22/2017 12:00 AM    HDL Cholesterol 43 09/30/2011 06:10 AM    LDL,Direct 45 10/05/2010 08:23 AM    LDL, calculated 84.2 09/30/2011 06:10 AM    Triglyceride 179 (H) 09/30/2011 06:10 AM    CHOL/HDL Ratio 3.8 09/30/2011 06:10 AM     Lab Results   Component Value Date/Time    Creatinine 0.94 04/01/2021 10:45 AM     Lab Results   Component Value Date/Time    BUN 18 04/01/2021 10:45 AM     Lab Results   Component Value Date/Time    Potassium 4.3 04/01/2021 10:45 AM     Lab Results   Component Value Date/Time    Hemoglobin A1c 6.3 (H) 10/16/2010 06:37 AM    Hemoglobin A1c, External 6.2 02/21/2015     Lab Results   Component Value Date/Time    HGB 14.5 11/21/2013 05:30 PM     Lab Results   Component Value Date/Time    PLATELET 486 26/15/3626 05:30 PM     No results for input(s): CPK, CKMB, TROIQ in the last 72 hours. No lab exists for component: CKQMB, CPKMB             ___________________________________________________    Reji Mcgee.  Viola Quinones MD, [facility] [place]

## 2021-08-10 ENCOUNTER — OFFICE VISIT (OUTPATIENT)
Dept: CARDIOLOGY CLINIC | Age: 71
End: 2021-08-10
Payer: MEDICARE

## 2021-08-10 VITALS
BODY MASS INDEX: 33.77 KG/M2 | HEIGHT: 69 IN | WEIGHT: 228 LBS | HEART RATE: 52 BPM | DIASTOLIC BLOOD PRESSURE: 81 MMHG | SYSTOLIC BLOOD PRESSURE: 135 MMHG | OXYGEN SATURATION: 97 %

## 2021-08-10 DIAGNOSIS — I25.10 CORONARY ARTERY DISEASE INVOLVING NATIVE CORONARY ARTERY OF NATIVE HEART WITHOUT ANGINA PECTORIS: ICD-10-CM

## 2021-08-10 DIAGNOSIS — I48.0 PAF (PAROXYSMAL ATRIAL FIBRILLATION) (HCC): Primary | ICD-10-CM

## 2021-08-10 DIAGNOSIS — I10 ESSENTIAL HYPERTENSION: ICD-10-CM

## 2021-08-10 PROCEDURE — 1101F PT FALLS ASSESS-DOCD LE1/YR: CPT | Performed by: INTERNAL MEDICINE

## 2021-08-10 PROCEDURE — 3017F COLORECTAL CA SCREEN DOC REV: CPT | Performed by: INTERNAL MEDICINE

## 2021-08-10 PROCEDURE — G0463 HOSPITAL OUTPT CLINIC VISIT: HCPCS | Performed by: INTERNAL MEDICINE

## 2021-08-10 PROCEDURE — G8432 DEP SCR NOT DOC, RNG: HCPCS | Performed by: INTERNAL MEDICINE

## 2021-08-10 PROCEDURE — G8536 NO DOC ELDER MAL SCRN: HCPCS | Performed by: INTERNAL MEDICINE

## 2021-08-10 PROCEDURE — 99213 OFFICE O/P EST LOW 20 MIN: CPT | Performed by: INTERNAL MEDICINE

## 2021-08-10 PROCEDURE — G8752 SYS BP LESS 140: HCPCS | Performed by: INTERNAL MEDICINE

## 2021-08-10 PROCEDURE — G8428 CUR MEDS NOT DOCUMENT: HCPCS | Performed by: INTERNAL MEDICINE

## 2021-08-10 PROCEDURE — 93010 ELECTROCARDIOGRAM REPORT: CPT | Performed by: INTERNAL MEDICINE

## 2021-08-10 PROCEDURE — G8417 CALC BMI ABV UP PARAM F/U: HCPCS | Performed by: INTERNAL MEDICINE

## 2021-08-10 PROCEDURE — G8754 DIAS BP LESS 90: HCPCS | Performed by: INTERNAL MEDICINE

## 2021-08-10 PROCEDURE — 93005 ELECTROCARDIOGRAM TRACING: CPT | Performed by: INTERNAL MEDICINE

## 2021-08-10 RX ORDER — METOPROLOL SUCCINATE 100 MG/1
TABLET, EXTENDED RELEASE ORAL
Qty: 90 TABLET | Refills: 3 | Status: SHIPPED | OUTPATIENT
Start: 2021-08-10 | End: 2022-09-19

## 2021-08-10 RX ORDER — LISINOPRIL 10 MG/1
10 TABLET ORAL DAILY
Qty: 90 TABLET | Refills: 3 | Status: SHIPPED | OUTPATIENT
Start: 2021-08-10 | End: 2022-06-20

## 2021-08-10 NOTE — PROGRESS NOTES
Chief Complaint   Patient presents with    Irregular Heart Beat     PAF    Hypertension    Follow-up     3 month     Visit Vitals  /81 (BP 1 Location: Left upper arm, BP Patient Position: Sitting)   Pulse (!) 52   Ht 5' 9\" (1.753 m)   Wt 228 lb (103.4 kg)   SpO2 97%   BMI 33.67 kg/m²     Chest pain denied   Palpations denied  SOB denied  Dizziness denied  Swelling in hands/feet OCCASIONALLYIN THE RIGHT FOOT  Recent hospital stays denied     BLL WEAKNESS, WITH STAIRS, NO SOB  BALANCE ISSUES ( SEEING HIS NEUROLOGIST)

## 2021-08-10 NOTE — PROGRESS NOTES
Office Follow-up    NAME: Osvaldo Stuart   :  1950  MRM:  [id number]    Date:  8/10/2021            Assessment:     Problem List  Date Reviewed: 3/29/2021        Codes Class Noted    PAF (paroxysmal atrial fibrillation) ([facility] ICD-10-CM: I48.0  ICD-9-CM: 427.31  3/29/2021        Severe obesity ([facility] ICD-10-CM: E66.01  ICD-9-CM: 278.01  9/10/2019        Neurogenic claudication due to lumbar spinal stenosis ICD-10-CM: M48.062  ICD-9-CM: 724.03  2017        Prediabetes (Chronic) ICD-10-CM: R73.03  ICD-9-CM: 790.29  10/4/2011        HTN (hypertension) ICD-10-CM: I10  ICD-9-CM: 401.9  10/4/2011        Mixed hyperlipidemia ICD-10-CM: E47.2  ICD-9-CM: 272.2  2011    Overview Addendum 2015  9:53 AM by Jesusita Vallejo MD     Lipids 10/5/10 -   HDL 39  LDLp- 2157, LDL-C 102   Lipids 10/5/10 -   HDL 34   Lipids 10/16/10 -  LDL 31 HDL 33 ,              CAD (coronary artery disease) (Chronic) ICD-10-CM: I25.10  ICD-9-CM: 414.00  2011    Overview Addendum 2011  8:33 AM by Kim Anaya     Inferior MI 2004  CATH:  2004- 2v CAD. LAD prox 40%, OM 80-90%, %  - ------PCI RCA with  stent  --------Staged PCI OM Aug 2004  AP 2005  CATH: 2005- new prox circ subtotal occlusion s/p PCI x 2 stents (Cypher), patent OM and RCA stents, EF 45% Vaibhav March @ [facility])  CATH: 11: LVEDP 23, LVG EF 50%, moderate inferior HK, LM normal, LAD calcified prox 60%, circumflex prox stent patent, OM 1 prox stent patent, RCA long prox/mid stent with 99% in-stent restenosis, T2 flow. Distal short stent probably patent but slow filling hampers assessment  ------PCI of RCA with 3.0 x 18 mm Xience (mid) and 2.75 x 15 Xience (distal)                      Plan:     1. Atrial fibrillation status post DCCV 2021, prior DCCV 2019: He is now in sinus rhythm with sinus bradycardia. Continue amiodarone to 100 mg p.o. daily along with metoprolol  mg p.o. daily. Is not on chronic anticoagulation as he cannot afford Eliquis and in the past we had offered him Coumadin but he is not interested. He is currently on aspirin 81 mg p.o. daily which he will continue. 2. CAD: This is stable. Continue current medications. There is residual LAD 60% lesion is stable without any ischemia on stress test last noted in 2017   3. Hypertension: Blood pressure is controlled. Continue Toprol-XL. 4. Dyslipidemia: Unable to take statins because of myalgias. Dietary control of cholesterol. FLP is follow-up primary care physician. 5. Sleep apnea: Uses dental device. 6. See Dr. Erwin Escobar in 6 months. He got   in 2019 on May 16th. Subjective:     Gui Peres, a 70y.o. year-old who presents for followup. He recently underwent A. fib cardioversion on April 1, 2021. This was his second cardioversion. He converted to sinus rhythm. Since then he has felt better. His last cardioversion was in 2019. Currently has no symptoms of chest pain, shortness of breath, palpitation, achiness or dizziness. He is currently on amiodarone 100 p.o. daily along with metoprolol 100 mg p.o. daily. EKG in our office today demonstrated sinus rhythm with sinus bradycardia with heart rate of 58 bpm with poor R wave progression with left axis deviation. Exam:     Physical Exam:  Visit Vitals  /81 (BP 1 Location: Left upper arm, BP Patient Position: Sitting)   Pulse (!) 52   Ht 5' 9\" (1.753 m)   Wt 228 lb (103.4 kg)   SpO2 97%   BMI 33.67 kg/m²     General appearance - alert, well appearing, and in no distress  Mental status - affect appropriate to mood  Eyes - sclera anicteric, moist mucous membranes  Neck - supple, no significant adenopathy    Medications:     Current Outpatient Medications   Medication Sig    amiodarone (PACERONE) 100 mg tablet Take 1 Tab by mouth daily.     metoprolol succinate (TOPROL-XL) 100 mg tablet TAKE ONE TABLET BY MOUTH DAILY  aspirin delayed-release 81 mg tablet Take 81 mg by mouth two (2) times a day.  lisinopril (PRINIVIL, ZESTRIL) 10 mg tablet Take 1 Tab by mouth daily.  ergocalciferol (ERGOCALCIFEROL) 50,000 unit capsule TAKE 1 CAPSULE BY MOUTH 3 TIMES A WEEK (Patient taking differently: Take 50,000 Units by mouth. Twice a week)    SILDENAFIL CITRATE (VIAGRA PO) Take 20 mg by mouth as needed.  naproxen (NAPROSYN) 250 mg tablet Take 250 mg by mouth as needed.  multivitamin (ONE A DAY) tablet Take 1 Tab by mouth daily. No current facility-administered medications for this visit. Diagnostic Data Review:       4/3/17 Spect lexiscan: LVEF 55%; small area of mod intensity basal to mid inferior wall infarct with mild vesna-infarct ischemia. Stress test cardiolite:5/4/11- basal and mid inferior ischemia, EF 56%    Cardiac catheterization:9/29/2011- LVEDP 23, LVG EF 50%, moderate inferior HK, LM normal, LAD calcified prox 60%, circumflex prox stent patent, OM 1 prox stent patent, RCA long prox/mid stent with 99% in-stent restenosis, T2 flow.  Distal short stent probably patent but slow filling hampers assessment   Stent insertion:9/29/11- PCI of RCA with 3.0 x 18 mm Xience (mid) and 2.75 x 15 Xience (distal)      Lab Review:     Lab Results   Component Value Date/Time    Cholesterol, total 163 09/30/2011 06:10 AM    Cholesterol, Total 159 05/22/2017 12:00 AM    HDL Cholesterol 43 09/30/2011 06:10 AM    LDL,Direct 45 10/05/2010 08:23 AM    LDL, calculated 84.2 09/30/2011 06:10 AM    Triglyceride 179 (H) 09/30/2011 06:10 AM    CHOL/HDL Ratio 3.8 09/30/2011 06:10 AM     Lab Results   Component Value Date/Time    Creatinine 0.94 04/01/2021 10:45 AM     Lab Results   Component Value Date/Time    BUN 18 04/01/2021 10:45 AM     Lab Results   Component Value Date/Time    Potassium 4.3 04/01/2021 10:45 AM     Lab Results   Component Value Date/Time    Hemoglobin A1c 6.3 (H) 10/16/2010 06:37 AM    Hemoglobin A1c, External 6.2 02/21/2015 12:00 AM     Lab Results   Component Value Date/Time    HGB 14.5 11/21/2013 05:30 PM     Lab Results   Component Value Date/Time    PLATELET 708 16/15/3510 05:30 PM     No results for input(s): CPK, CKMB, TROIQ in the last 72 hours. No lab exists for component: CKQMB, CPKMB             ___________________________________________________    [facility].  Fran Joshi MD, [facility] [place]

## 2021-08-10 NOTE — PROGRESS NOTES
All orders entered per verbal orders of Dr. Les Carr Lisinopril and Metoprolol. See Dr. Stefan Bloch in 6 months. Refill per VO of Dr. Nilo Adkins  Last appt: 5/10/2021  No future appointments. Requested Prescriptions     Pending Prescriptions Disp Refills    lisinopriL (PRINIVIL, ZESTRIL) 10 mg tablet 90 Tablet 3     Sig: Take 1 Tablet by mouth daily.     metoprolol succinate (TOPROL-XL) 100 mg tablet 90 Tablet 3     Sig: Take one tablet by  Mouth daily

## 2022-02-14 ENCOUNTER — OFFICE VISIT (OUTPATIENT)
Dept: CARDIOLOGY CLINIC | Age: 72
End: 2022-02-14
Payer: MEDICARE

## 2022-02-14 VITALS
HEART RATE: 54 BPM | OXYGEN SATURATION: 99 % | SYSTOLIC BLOOD PRESSURE: 180 MMHG | BODY MASS INDEX: 34.07 KG/M2 | WEIGHT: 230 LBS | HEIGHT: 69 IN | DIASTOLIC BLOOD PRESSURE: 80 MMHG

## 2022-02-14 DIAGNOSIS — E78.2 MIXED HYPERLIPIDEMIA: ICD-10-CM

## 2022-02-14 DIAGNOSIS — I25.10 CORONARY ARTERY DISEASE INVOLVING NATIVE CORONARY ARTERY OF NATIVE HEART WITHOUT ANGINA PECTORIS: ICD-10-CM

## 2022-02-14 DIAGNOSIS — I10 ESSENTIAL HYPERTENSION: ICD-10-CM

## 2022-02-14 DIAGNOSIS — I48.0 PAF (PAROXYSMAL ATRIAL FIBRILLATION) (HCC): Primary | ICD-10-CM

## 2022-02-14 PROCEDURE — G8753 SYS BP > OR = 140: HCPCS | Performed by: INTERNAL MEDICINE

## 2022-02-14 PROCEDURE — G8417 CALC BMI ABV UP PARAM F/U: HCPCS | Performed by: INTERNAL MEDICINE

## 2022-02-14 PROCEDURE — 99214 OFFICE O/P EST MOD 30 MIN: CPT | Performed by: INTERNAL MEDICINE

## 2022-02-14 PROCEDURE — 1101F PT FALLS ASSESS-DOCD LE1/YR: CPT | Performed by: INTERNAL MEDICINE

## 2022-02-14 PROCEDURE — G8536 NO DOC ELDER MAL SCRN: HCPCS | Performed by: INTERNAL MEDICINE

## 2022-02-14 PROCEDURE — 93005 ELECTROCARDIOGRAM TRACING: CPT | Performed by: INTERNAL MEDICINE

## 2022-02-14 PROCEDURE — G0463 HOSPITAL OUTPT CLINIC VISIT: HCPCS | Performed by: INTERNAL MEDICINE

## 2022-02-14 PROCEDURE — 3017F COLORECTAL CA SCREEN DOC REV: CPT | Performed by: INTERNAL MEDICINE

## 2022-02-14 PROCEDURE — G8754 DIAS BP LESS 90: HCPCS | Performed by: INTERNAL MEDICINE

## 2022-02-14 PROCEDURE — G8428 CUR MEDS NOT DOCUMENT: HCPCS | Performed by: INTERNAL MEDICINE

## 2022-02-14 PROCEDURE — 93010 ELECTROCARDIOGRAM REPORT: CPT | Performed by: INTERNAL MEDICINE

## 2022-02-14 PROCEDURE — G8432 DEP SCR NOT DOC, RNG: HCPCS | Performed by: INTERNAL MEDICINE

## 2022-02-14 RX ORDER — CHLORTHALIDONE 25 MG/1
25 TABLET ORAL DAILY
Qty: 30 TABLET | Refills: 5 | Status: SHIPPED | OUTPATIENT
Start: 2022-02-14 | End: 2022-10-18

## 2022-02-14 NOTE — PROGRESS NOTES
Orders for Stop taking Amiodarone. Start Chlorthalidone 25mg po daily. See Dr. Nayely Galaviz in 6 months.     per Dr. Gonzalo Gross VO.

## 2022-02-14 NOTE — PROGRESS NOTES
Mandy Churchill is a 67 y.o. male    Visit Vitals  BP (!) 180/80 (BP 1 Location: Left upper arm, BP Patient Position: Sitting, BP Cuff Size: Adult)   Pulse (!) 54   Ht 5' 9\" (1.753 m)   Wt 230 lb (104.3 kg)   SpO2 99%   BMI 33.97 kg/m²       Chief Complaint   Patient presents with    Cholesterol Problem    Coronary Artery Disease    Hypertension    Irregular Heart Beat     PAF       Chest pain NO  SOB NO  Dizziness NO  Swelling RIGHT LEG  Recent hospital visit NO  Refills NO  COVID VACCINE STATUS YES  HAD COVID?  NO

## 2022-03-19 PROBLEM — M48.062 NEUROGENIC CLAUDICATION DUE TO LUMBAR SPINAL STENOSIS: Status: ACTIVE | Noted: 2017-02-27

## 2022-03-19 PROBLEM — E66.01 SEVERE OBESITY (HCC): Status: ACTIVE | Noted: 2019-09-10

## 2022-03-19 PROBLEM — I48.0 PAF (PAROXYSMAL ATRIAL FIBRILLATION) (HCC): Status: ACTIVE | Noted: 2021-03-29

## 2022-04-26 ENCOUNTER — OFFICE VISIT (OUTPATIENT)
Dept: NEUROLOGY | Age: 72
End: 2022-04-26
Payer: MEDICARE

## 2022-04-26 VITALS
HEIGHT: 69 IN | DIASTOLIC BLOOD PRESSURE: 74 MMHG | BODY MASS INDEX: 34.07 KG/M2 | OXYGEN SATURATION: 97 % | HEART RATE: 78 BPM | WEIGHT: 230 LBS | SYSTOLIC BLOOD PRESSURE: 124 MMHG | RESPIRATION RATE: 16 BRPM

## 2022-04-26 DIAGNOSIS — R47.81 SLURRED SPEECH: ICD-10-CM

## 2022-04-26 DIAGNOSIS — R27.8 WORSENED HANDWRITING: ICD-10-CM

## 2022-04-26 DIAGNOSIS — R13.10 DYSPHAGIA, UNSPECIFIED TYPE: ICD-10-CM

## 2022-04-26 DIAGNOSIS — R45.89 FLAT AFFECT: ICD-10-CM

## 2022-04-26 DIAGNOSIS — G20 PARKINSON'S DISEASE (HCC): ICD-10-CM

## 2022-04-26 DIAGNOSIS — R26.9 GAIT DIFFICULTY: Primary | ICD-10-CM

## 2022-04-26 DIAGNOSIS — R29.898 WEAKNESS OF BOTH LEGS: ICD-10-CM

## 2022-04-26 PROCEDURE — 99205 OFFICE O/P NEW HI 60 MIN: CPT | Performed by: PSYCHIATRY & NEUROLOGY

## 2022-04-26 PROCEDURE — G8754 DIAS BP LESS 90: HCPCS | Performed by: PSYCHIATRY & NEUROLOGY

## 2022-04-26 PROCEDURE — 3017F COLORECTAL CA SCREEN DOC REV: CPT | Performed by: PSYCHIATRY & NEUROLOGY

## 2022-04-26 PROCEDURE — 1101F PT FALLS ASSESS-DOCD LE1/YR: CPT | Performed by: PSYCHIATRY & NEUROLOGY

## 2022-04-26 PROCEDURE — G8417 CALC BMI ABV UP PARAM F/U: HCPCS | Performed by: PSYCHIATRY & NEUROLOGY

## 2022-04-26 PROCEDURE — G8752 SYS BP LESS 140: HCPCS | Performed by: PSYCHIATRY & NEUROLOGY

## 2022-04-26 PROCEDURE — G8536 NO DOC ELDER MAL SCRN: HCPCS | Performed by: PSYCHIATRY & NEUROLOGY

## 2022-04-26 PROCEDURE — G8510 SCR DEP NEG, NO PLAN REQD: HCPCS | Performed by: PSYCHIATRY & NEUROLOGY

## 2022-04-26 PROCEDURE — G8427 DOCREV CUR MEDS BY ELIG CLIN: HCPCS | Performed by: PSYCHIATRY & NEUROLOGY

## 2022-04-26 RX ORDER — CARBIDOPA AND LEVODOPA 25; 100 MG/1; MG/1
TABLET ORAL
Qty: 249 TABLET | Refills: 0 | Status: SHIPPED | OUTPATIENT
Start: 2022-04-26 | End: 2022-06-07

## 2022-04-26 NOTE — PROGRESS NOTES
Chief Complaint   Patient presents with    New Patient     patient states he has a balance issue, he has had a fall, his wife states he can be very wobblely, and going up the stairs are a problem, his wife states he shuffles when walking, this has been ongoing for about 18 months per wife and patient states 8 months     Visit Vitals  /74   Pulse 78   Resp 16   Ht 5' 9\" (1.753 m)   Wt 104.3 kg (230 lb)   SpO2 97%   BMI 33.97 kg/m²

## 2022-04-26 NOTE — PROGRESS NOTES
Dejah Arenas (1950) is a 67 y.o. male, new patient, here for evaluation of the following     Chief complaint(s):   Chief Complaint   Patient presents with    New Patient     patient states he has a balance issue, he has had a fall, his wife states he can be very wobblely, and going up the stairs are a problem, his wife states he shuffles when walking, this has been ongoing for about 18 months per wife and patient states 8 months       HPI: 67 y.o. male      Patient presents to discuss gait issues. I had seen him previously (last seen in 2017) for chronic back pain/ radicular pain. Underwent L4-5 spine surgery in 2018. Accompanied by Wife today. They both contribute to history. Wife estimates that for the past 18 months his balance has been getting worse, he has trouble getting out of a chair some push down with his arms to stand up, or sometimes has to scoot and make multiple attempts to stand up. He notes having a harder time raising the right leg to get into his truck. He finds it hard stairs and feels like his feet do not push down strong enough. He notes that his walking speed is reduced and some days feel heavy and tired. Sometimes it feels like his foot or feet are stuck on the ground. Wife notes that he seems better in the morning than the evenings. She says he seems more shuffling later in the day his facial expressions or affect seem flat, not as expressive. When she first met him in 2017 says he was much more outgoing. She notes that he seems to have some delayed reaction time. She also notes that when they are talking he may look to the side, not directly at her, which is different for him. He notes that his handwriting is getting worse, smaller, not as clear. He also reports feeling like he chokes on dry food. Deny that he has any resting tremors or any tremors when he is holding things. No difficulty feeding himself or holding things.       Review of Systems: CAD/heart disease, depression, leg swelling, diabetes, high blood pressure, neuropathy, shortness of breath, muscle pain muscle weakness, falls, fatigue    ==================================================    Allergies   Allergen Reactions    Metformin Unknown (comments)    Niaspan [Niacin] Other (comments)     Flushing and itching    Statins-Hmg-Coa Reductase Inhibitors Myalgia       Current Outpatient Medications   Medication Sig Dispense Refill    carbidopa-levodopa (SINEMET)  mg per tablet Take 0.5 Tablets by mouth three (3) times daily for 14 days, THEN 1 Tablet three (3) times daily for 76 days. For Parkinson's 249 Tablet 0    chlorthalidone (HYGROTON) 25 mg tablet Take 1 Tablet by mouth daily. 30 Tablet 5    lisinopriL (PRINIVIL, ZESTRIL) 10 mg tablet Take 1 Tablet by mouth daily. 90 Tablet 3    metoprolol succinate (TOPROL-XL) 100 mg tablet Take one tablet by  Mouth daily 90 Tablet 3    aspirin delayed-release 81 mg tablet Take 81 mg by mouth two (2) times a day.  ergocalciferol (ERGOCALCIFEROL) 50,000 unit capsule TAKE 1 CAPSULE BY MOUTH 3 TIMES A WEEK (Patient taking differently: Take 50,000 Units by mouth. Twice a week) 36 Cap 0    naproxen (NAPROSYN) 250 mg tablet Take 250 mg by mouth as needed.  multivitamin (ONE A DAY) tablet Take 1 Tab by mouth daily.  SILDENAFIL CITRATE (VIAGRA PO) Take 20 mg by mouth as needed. (Patient not taking: Reported on 2/14/2022)         Past Medical History:   Diagnosis Date    CAD (coronary artery disease)     Diabetes ([facility] [address]     Essential hypertension     Hyperlipidemia     Hypertension     Ill-defined condition     lumbar stenosis       Past Surgical History:   Procedure Laterality Date    CARDIAC CATHETERIZATION  9/29/2011    LVEDP 23, LVG EF 50%, moderate inferior HK, LM normal, LAD calcified prox 60%, circumflex prox stent patent, OM 1 prox stent patent, RCA long prox/mid stent with 99% in-stent restenosis, T2 flow.  Distal short stent probably patent but slow filling hampers assessment    COLONOSCOPY N/A 7/25/2019    COLONOSCOPY performed by Sophia Momin MD at [address]  2004, 2011    x2  and then [address]    HX OTHER SURGICAL      tracheotomy at age 3   Tomdany Grater HX PTCA      2 stents with  2004 then another stent  2005    HX TONSILLECTOMY      STENT INSERTION  9/29/11    PCI of RCA with 3.0 x 18 mm Xience (mid) and 2.75 x 15 Xience (distal)    STRESS TEST CARDIOLITE  5/4/11    11 min, basal and mid inferior ischemia, EF 56%       family history includes Coronary Art Dis in his father and mother. reports that he has never smoked. He has never used smokeless tobacco. He reports current alcohol use of about 2.0 standard drinks of alcohol per week. He reports that he does not use drugs. PHYSICAL EXAM    Vitals:    04/26/22 0850   BP: 124/74   Pulse: 78   Resp: 16   Height: 5' 9\" (1.753 m)   Weight: 104.3 kg (230 lb)   SpO2: 97%     General    Head: atraumatic. Eyes: Conjunctivae clear. Vascular/ Carotid Arteries: not examined. Extremities: no edema. Skin: no rashes. Neurologic Exam:  Speech/ Language: mild dysarthria, no aphasia. Alertness: oriented x 3.  CNs: Smell: not tested. Visual Fields (II): full to confrontation. Pupils (II): equal, round. Funduscopic: not tested. Extraocular movements (III, IV, VI): conjugate in all directions, no MARIA ESTHER. Ptosis (III, VII): none. Facial Sensation (V): intact to LT on both sides. Facial Movements (VII): symmetric at rest and on activation, but globally reduced  Hearing (VIII): normal.  Soft palate elevation (IX): symmetric, no droop. Shoulder shrug (XI): symmetric, strong. Tongue protrusion (XII): midline; no fasicuclations    Motor:  5/5 in all extremities. No spasticity. Sensory: intact LT, temp, vibration in all exts. Intact proprioception in feet. Cerebellar: no resting, postural, or intention tremors.  No cogwheel rigidity. Deep Tendon Reflexes: trace to absent patellars, achilles and biceps. Plantar response: not tested. Gait: stands independently. Ambulates independently, no shuffling, but doesn't lift feet fully (steppage gait). Romberg: negative      ==================================================    ASSESSMENT/ PLAN:       ICD-10-CM ICD-9-CM    1. Gait difficulty  R26.9 781.2 MRI BRAIN W WO CONT      MRI CERV SPINE W WO CONT      ACETYLCHOLINE RECEPTOR PANEL      EMG LIMITED   2. Flat affect  R45.89 799.29 MRI BRAIN W WO CONT   3. Slurred speech  R47.81 784. 61 MRI BRAIN W WO CONT      ACETYLCHOLINE RECEPTOR PANEL   4. Worsened handwriting  R27.8 781.3 MRI BRAIN W WO CONT   5. Weakness of both legs  R29.898 729.89 MRI BRAIN W WO CONT      MRI CERV SPINE W WO CONT      ACETYLCHOLINE RECEPTOR PANEL      EMG LIMITED      CK   6. Dysphagia, unspecified type  R13.10 787.20 MRI BRAIN W WO CONT      ACETYLCHOLINE RECEPTOR PANEL   7. Parkinson's disease ([facility] [address]  G20 332.0 MRI BRAIN W WO CONT      carbidopa-levodopa (SINEMET)  mg per tablet      D/w patient-Wife that the combination of reduced size of handwriting, flattened facial affect, balance difficulty, unsteady gait is most suggestive of Parkinson's. D/w checking Brain MRI (to eval for NPH as an alternate cause), Cervical MRI (to eval for cervical myelopathy as a cause), EMG one arm/ leg and muscle enzymes (CK), Acetylcholine receptor antibodies to look for alternate causes. Discussed trial of Carbidopa-Levodopa (Sinemet) to see if this improves gait/ balance (doesn't have tremors). He was agreeable to trying that. Sent Rx to pharmacy (starting with HALF tab TID x 2 weeks, then one full tablet TID thereafter). Follow up to go over test results, response to Sinemet        An electronic signature was used to authenticate this note.   -- Marybel Alfred MD

## 2022-05-03 LAB
ACHR BIND AB SER-SCNC: <0.03 NMOL/L (ref 0–0.24)
ACHR BLOCK AB SER-ACNC: 18 % (ref 0–25)
ACHR MOD AB/ACHR TOTAL SFR SER: NORMAL %
CK SERPL-CCNC: 204 U/L (ref 41–331)

## 2022-05-10 ENCOUNTER — HOSPITAL ENCOUNTER (OUTPATIENT)
Dept: MRI IMAGING | Age: 72
Discharge: HOME | End: 2022-05-10
Attending: PSYCHIATRY & NEUROLOGY
Payer: MEDICARE

## 2022-05-10 DIAGNOSIS — R45.89 FLAT AFFECT: ICD-10-CM

## 2022-05-10 DIAGNOSIS — R29.898 WEAKNESS OF BOTH LEGS: ICD-10-CM

## 2022-05-10 DIAGNOSIS — R13.10 DYSPHAGIA, UNSPECIFIED TYPE: ICD-10-CM

## 2022-05-10 DIAGNOSIS — R27.8 WORSENED HANDWRITING: ICD-10-CM

## 2022-05-10 DIAGNOSIS — R47.81 SLURRED SPEECH: ICD-10-CM

## 2022-05-10 DIAGNOSIS — G20 PARKINSON'S DISEASE (HCC): ICD-10-CM

## 2022-05-10 DIAGNOSIS — R26.9 GAIT DIFFICULTY: ICD-10-CM

## 2022-05-10 PROCEDURE — 72156 MRI NECK SPINE W/O & W/DYE: CPT

## 2022-05-10 PROCEDURE — 74011250636 HC RX REV CODE- 250/636: Performed by: PSYCHIATRY & NEUROLOGY

## 2022-05-10 PROCEDURE — A9576 INJ PROHANCE MULTIPACK: HCPCS | Performed by: PSYCHIATRY & NEUROLOGY

## 2022-05-10 PROCEDURE — 70553 MRI BRAIN STEM W/O & W/DYE: CPT

## 2022-05-10 RX ADMIN — GADOTERIDOL 20 ML: 279.3 INJECTION, SOLUTION INTRAVENOUS at 10:31

## 2022-05-23 ENCOUNTER — OFFICE VISIT (OUTPATIENT)
Dept: NEUROLOGY | Age: 72
End: 2022-05-23
Payer: MEDICARE

## 2022-05-23 DIAGNOSIS — M54.16 LUMBAR RADICULOPATHY, RIGHT: ICD-10-CM

## 2022-05-23 DIAGNOSIS — G56.21 NEUROPATHY OF RIGHT ULNAR NERVE AT WRIST: ICD-10-CM

## 2022-05-23 DIAGNOSIS — G56.01 MILD CARPAL TUNNEL SYNDROME OF RIGHT WRIST: Primary | ICD-10-CM

## 2022-05-23 PROCEDURE — 95886 MUSC TEST DONE W/N TEST COMP: CPT | Performed by: PSYCHIATRY & NEUROLOGY

## 2022-05-23 PROCEDURE — 95912 NRV CNDJ TEST 11-12 STUDIES: CPT | Performed by: PSYCHIATRY & NEUROLOGY

## 2022-05-23 NOTE — PROGRESS NOTES
No severe spinal canal stenosis or spinal cord signal abnormality to account for gait changes. Has multi-level cervical spondylosis (disc degeneration/ arthritic change) which is not the cause of any gait issues.

## 2022-05-23 NOTE — PROGRESS NOTES
Normal age-related cerebral atrophy and mild chronic cerebrovascular small vessel ischemic changes. No evidence of hydrocephalus (ie NPH) or other structural abnormality to cause progressive gait abnormality.

## 2022-06-07 ENCOUNTER — OFFICE VISIT (OUTPATIENT)
Dept: NEUROLOGY | Age: 72
End: 2022-06-07
Payer: MEDICARE

## 2022-06-07 VITALS
OXYGEN SATURATION: 99 % | WEIGHT: 230 LBS | SYSTOLIC BLOOD PRESSURE: 128 MMHG | DIASTOLIC BLOOD PRESSURE: 78 MMHG | RESPIRATION RATE: 16 BRPM | HEIGHT: 69 IN | HEART RATE: 90 BPM | BODY MASS INDEX: 34.07 KG/M2

## 2022-06-07 DIAGNOSIS — G20 PARKINSON'S DISEASE (HCC): Primary | ICD-10-CM

## 2022-06-07 PROCEDURE — G8536 NO DOC ELDER MAL SCRN: HCPCS | Performed by: PSYCHIATRY & NEUROLOGY

## 2022-06-07 PROCEDURE — G8752 SYS BP LESS 140: HCPCS | Performed by: PSYCHIATRY & NEUROLOGY

## 2022-06-07 PROCEDURE — 1101F PT FALLS ASSESS-DOCD LE1/YR: CPT | Performed by: PSYCHIATRY & NEUROLOGY

## 2022-06-07 PROCEDURE — 3017F COLORECTAL CA SCREEN DOC REV: CPT | Performed by: PSYCHIATRY & NEUROLOGY

## 2022-06-07 PROCEDURE — G8432 DEP SCR NOT DOC, RNG: HCPCS | Performed by: PSYCHIATRY & NEUROLOGY

## 2022-06-07 PROCEDURE — G8417 CALC BMI ABV UP PARAM F/U: HCPCS | Performed by: PSYCHIATRY & NEUROLOGY

## 2022-06-07 PROCEDURE — G8754 DIAS BP LESS 90: HCPCS | Performed by: PSYCHIATRY & NEUROLOGY

## 2022-06-07 PROCEDURE — 99214 OFFICE O/P EST MOD 30 MIN: CPT | Performed by: PSYCHIATRY & NEUROLOGY

## 2022-06-07 PROCEDURE — 1123F ACP DISCUSS/DSCN MKR DOCD: CPT | Performed by: PSYCHIATRY & NEUROLOGY

## 2022-06-07 PROCEDURE — G8427 DOCREV CUR MEDS BY ELIG CLIN: HCPCS | Performed by: PSYCHIATRY & NEUROLOGY

## 2022-06-07 RX ORDER — CARBIDOPA AND LEVODOPA 25; 100 MG/1; MG/1
2 TABLET ORAL 3 TIMES DAILY
Qty: 540 TABLET | Refills: 0 | Status: SHIPPED | OUTPATIENT
Start: 2022-06-07 | End: 2022-07-19

## 2022-06-07 NOTE — PROGRESS NOTES
Richard Camargo (1950) is a 67 y.o. male, established patient, here for evaluation of the following     Chief complaint(s):   Chief Complaint   Patient presents with    Results     results of EMG, labs and imaging        SUBJECTIVE/ OBJECTIVE:    HPI: 67 y.o. male      Wife accompanies patient today    Discussed with him that the Brain MRI shows normal age-related cerebral atrophy and mild chronic cerebrovascular small vessel ischemic changes. No evidence of hydrocephalus (ie NPH) or other structural abnormality to cause progressive gait abnormality. MRI C-spine does not show any severe spinal canal stenosis or spinal cord signal abnormality to account for gait changes. Has multi-level cervical spondylosis (disc degeneration/ arthritic change) which is not the cause of any gait issues. EMG of right arm and leg shows:   1. Mild right median neuropathy at wrist (ie, CTS)  2. Borderline/ very mild left ulnar neuropathy at wrist  3. No electrodiagnostic evidence of right cervical motor radiculopathy  4. Mild right S1 lumbar motor radiculopathy  5. No electrodiagnostic evidence of large fiber neuropathy, or myopathic process      CK level was normal at 204. Acetylcholine receptor antibodies were negative. Started on Sinemet  at last visit and patient is worked up to 1 tablet 3 times a day as instructed. Patient and wife have not really seen much difference with his ability to stand up/ambulate or his dysphagia (suspected Parkinson's). He did not have any tremors to begin with.   He has not noticed any side effects from the medication.      ========================================    Brief Hx:     Initial Visit: 4/26/2022 (see for full details)      Allergies   Allergen Reactions    Metformin Unknown (comments)    Niaspan [Niacin] Other (comments)     Flushing and itching    Statins-Hmg-Coa Reductase Inhibitors Myalgia         Current Outpatient Medications:     carbidopa-levodopa (SINEMET)  mg per tablet, Take 2 Tablets by mouth three (3) times daily for 90 days. For Parkinson's, Disp: 540 Tablet, Rfl: 0    chlorthalidone (HYGROTON) 25 mg tablet, Take 1 Tablet by mouth daily. , Disp: 30 Tablet, Rfl: 5    lisinopriL (PRINIVIL, ZESTRIL) 10 mg tablet, Take 1 Tablet by mouth daily. , Disp: 90 Tablet, Rfl: 3    metoprolol succinate (TOPROL-XL) 100 mg tablet, Take one tablet by  Mouth daily, Disp: 90 Tablet, Rfl: 3    aspirin delayed-release 81 mg tablet, Take 81 mg by mouth two (2) times a day., Disp: , Rfl:     ergocalciferol (ERGOCALCIFEROL) 50,000 unit capsule, TAKE 1 CAPSULE BY MOUTH 3 TIMES A WEEK (Patient taking differently: Take 50,000 Units by mouth. Twice a week), Disp: 36 Cap, Rfl: 0    naproxen (NAPROSYN) 250 mg tablet, Take 250 mg by mouth as needed. , Disp: , Rfl:     multivitamin (ONE A DAY) tablet, Take 1 Tab by mouth daily. , Disp: , Rfl:     SILDENAFIL CITRATE (VIAGRA PO), Take 20 mg by mouth as needed. (Patient not taking: Reported on 2/14/2022), Disp: , Rfl:      has a past medical history of CAD (coronary artery disease), Diabetes ([facility] [address] Essential hypertension, Hyperlipidemia, Hypertension, and Ill-defined condition. has a past surgical history that includes hx ptca; stress test cardiolite (5/4/11); cardiac catheterization (9/29/2011); stent insertion (9/29/11); hx heart catheterization (2004, 2011); hx tonsillectomy; hx other surgical; and colonoscopy (N/A, 7/25/2019).       Physical Exam:    Vitals:    06/07/22 1517   BP: 128/78   Pulse: 90   Resp: 16   Height: 5' 9\" (1.753 m)   Weight: 104.3 kg (230 lb)   SpO2: 99%     Awake alert resting conversant  Speech has mild low volume  No resting tremors no postural tremors no cogwheel rigidity  5 out of 5 strength all extremities  Stands independently  Ambulates with slight forward leaning, nonshuffling gait, not fully  his feet  ========================================    ASSESSMENT/ PLAN:       ICD-10-CM ICD-9-CM    1. Parkinson's disease ([facility] [address]  G20 332.0 carbidopa-levodopa (SINEMET)  mg per tablet      REFERRAL TO NEUROLOGY      REFERRAL TO PHYSICAL THERAPY        Increased Sinemet to 2 tabs TID  Referred to PT for LVST-BIG protocol PT  Referred to [facility]/ Dr Michial Holstein for 2nd opinion on new dx of Parkinson's    Will forward all notes / test results for Dr Michial Holstein to review      An electronic signature was used to authenticate this note.   -- Ralph Pisano MD

## 2022-06-07 NOTE — PROGRESS NOTES
Chief Complaint   Patient presents with    Results     results of EMG, labs and imaging      Visit Vitals  /78   Pulse 90   Resp 16   Ht 5' 9\" (1.753 m)   Wt 104.3 kg (230 lb)   SpO2 99%   BMI 33.97 kg/m²

## 2022-06-08 ENCOUNTER — TELEPHONE (OUTPATIENT)
Dept: NEUROLOGY | Age: 72
End: 2022-06-08

## 2022-06-08 DIAGNOSIS — G20 PARKINSON'S DISEASE (HCC): Primary | ICD-10-CM

## 2022-06-08 NOTE — TELEPHONE ENCOUNTER
Pt states referral was written for [facility] but he is unable to get an appt until 2/2023. He is requesting a new referral to different provider. Please call back.

## 2022-06-20 ENCOUNTER — TELEPHONE (OUTPATIENT)
Dept: NEUROLOGY | Age: 72
End: 2022-06-20

## 2022-06-20 RX ORDER — LISINOPRIL 10 MG/1
TABLET ORAL
Qty: 90 TABLET | Refills: 2 | Status: SHIPPED | OUTPATIENT
Start: 2022-06-20

## 2022-06-20 NOTE — TELEPHONE ENCOUNTER
Refill per VO of Dr. Hightower Dub:    Last appt: 2/14/2022    Future Appointments   Date Time Provider Darron Romina   7/6/2022 10:45 AM UZMA Restrepo[facility]   8/17/2022  2:20 PM Alfa Cuello MD [facility]   9/7/2022  1:30 PM Abelardo Givens MD [facility]       Requested Prescriptions     Pending Prescriptions Disp Refills    lisinopriL (PRINIVIL, ZESTRIL) 10 mg tablet [Pharmacy Med Name: LISINOPRIL 10 MG TABLET] 90 Tablet 3     Sig: TAKE ONE TABLET BY MOUTH DAILY

## 2022-06-20 NOTE — TELEPHONE ENCOUNTER
CALLED PATIENT TO ADVISE TO SEE DR Tomás Ibanez FOR 2ND OPINION SENT REFERRAL AND NOTES TO DR Helen Tierney

## 2022-07-06 ENCOUNTER — HOSPITAL ENCOUNTER (OUTPATIENT)
Dept: PHYSICAL THERAPY | Age: 72
Discharge: HOME | End: 2022-07-06
Payer: MEDICARE

## 2022-07-06 PROCEDURE — 97535 SELF CARE MNGMENT TRAINING: CPT

## 2022-07-06 PROCEDURE — 97162 PT EVAL MOD COMPLEX 30 MIN: CPT

## 2022-07-06 NOTE — PROGRESS NOTES
PT INITIAL EVALUATION NOTE - [facility] 2-15    Patient Name: Frandy Gomez  Date:2022  : 1950  [x]  Patient  Verified  [place]or: [place] MED[place] / Plan: [place] MEDICARE PART A & B / Product Type: Medicare /    In time: 10:50  Out time: 11:50  Total Treatment Time (min): 60  Total Timed Codes (min): 15  1:1 Treatment Time ( only): 15   Visit #: 1     Treatment Area: Parkinson's disease [G20]    SUBJECTIVE  Pain Level (0-10 scale): Current- 0    Any medication changes, allergies to medications, adverse drug reactions, diagnosis change, or new procedure performed?: [] No    [x] Yes (see summary sheet for update)  Subjective:  CC of weakness and balance issues. He reports that in  he noticed problems on the stairstepper, \"It felt like climbing a mountain. \"  He was diagnosed with spinal stenosis, tried conservative care, but ended up having a decompression L3-5 in 2018. 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 Early last year he noticed that the leg weakness and balance issues started, almost falling at work. He was getting dizzy at night and was slurring his speech and believed it was due to medications. He changed meds and timing and noted some imporvement with that. His PCP referred him to Dr. Michelle Minaya for ataxia who suspects it may be early stages of PD. Patient also notes flat affect toward the end of the day. He reports that he rarely exercises and often feels tired and stiff. He has started taking Sinemet but does not notice improvement. A few weeks ago he felt like he pulled something in his back and has seen a DO for this, prescribed stretches which have helped. He is avoiding lifting at this time. He also reports history of L sided RC tear which does not often bother him.         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  Mechanism of Injury: insidious onset   Previous Treatment/Compliance: Sinemet, previous back surgery with PT  PMHx/Surgical Hx: HTN, hyperlipidemia, CAD, DM, s/p cardiac catheterization (, ), stent placement (, , )  Work Hx: Retired, worked as a teacher and an RN  Living Situation: Lives with his wife, stairs at home and daughter and grandchildren planning to move in temporarily soon   Pt Goals:  To walk better consistently and be less stiff   Barriers: Chronicity   Motivation: Motivated   Substance use: None noted  Cognition: A & O x 4        OBJECTIVE/EXAMINATION  Gait:  Decreased arm swing on R compared to L, notes that today is a \"good day\" but sometimes feels much stiffer and weaker and overly pronated, unstable through gait cycle     Tremor/dyskinesia:  None      Rigidity: none     LE MMT:     R L  Hip flexion  3+ 4+  Knee extension 5 5  Knee flexion  4 4+  DF  5 5  PF   5 5      Gross UE ROM: WNL    Gross LE ROM: WNL                             Sensation: Intact and equal bilaterally     Coordination:    Dysdiadochokinesia: increased difficulty on R at faster speed    Finger to nose: decreased force on L     Functional Tests:       Five time sit to stand: 11 seconds           3 step TUG   TU seconds    Manual: 14 seconds    Cognitive: 11 seconds           10 min Self Care/Home Management:  [x]  See flow sheet : Explanation of PD, typical deficits and sensory feedback errors, benefit of program in very early stages and application to general movement disorders, benefit from general exercise and aerobic activity to slow progression of PD   Rationale: improve understanding   to improve the patients ability to commit to and perform LSVT BIG protocol             With   [] TE   [] TA   [] Neuro   [] SC   [] other: Patient Education: [x] Review HEP    [] Progressed/Changed HEP based on:   [] positioning   [] body mechanics   [] transfers   [] heat/ice application    [] other:      Other Objective/Functional Measures: FOTO Functional Measure: 53/100                      Pain Level (0-10 scale) post treatment: 0    ASSESSMENT/Changes in Function:     [x]  See Plan of Desi Espinosa 27, DPT 7/6/2022

## 2022-07-08 NOTE — THERAPY EVALUATION
Physical Therapy at [facility],   a part of  [facility]  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 Assessment/ key information: Patient presents with fear of falling and gait instability 2/2 recent diagnosis of PD. Notes difficulty with fine motor tasks including handwriting as well as gross motor difficulties with sit to stand transfers. Basil Lee is an excellent candidate for treatment with the LSVT BIG® Protocol to improve gait speed and step length, balance, and overall functional mobility for all ADL's. Evaluation Complexity History HIGH Complexity :3+ comorbidities / personal factors will impact the outcome/ POC ; Examination MEDIUM Complexity : 3 Standardized tests and measures addressing body structure, function, activity limitation and / or participation in recreation  ;Presentation MEDIUM Complexity : Evolving with changing characteristics  ; Clinical Decision Making MEDIUM Complexity : FOTO score of 26-74  Overall Complexity Rating: MEDIUM    Problem List: decrease ROM, decrease strength, impaired gait/ balance, decrease ADL/ functional abilitiies, decrease activity tolerance, decrease flexibility/ joint mobility and decrease transfer abilities   Treatment Plan may include any combination of the following: Therapeutic exercise, Therapeutic activities, Neuromuscular re-education, Physical agent/modality, Gait/balance training, Manual therapy, Patient education, Self Care training, Functional mobility training, Home safety training and Stair training  Patient / Family readiness to learn indicated by: asking questions, trying to perform skills and interest  Persons(s) to be included in education: patient (P)  Barriers to Learning/Limitations: None  Patient Goal (s): To walk better consistently and be less stiff   Patient Self Reported Health Status: fair  Rehabilitation Potential: excellent    Short Term Goals: To be accomplished in 16 treatments:  1. Patient will report 0 LOB in 1 week to increase safety with household mobility. 2. Patient will consistently be able to perform sit to stand transfer on first attempt without UE assist to rising from sofa. 3. Patient will be able to write a full sentence with consistent lettering size to ease writting tasks and increase legibility. Frequency / Duration: Patient to be seen 4x/week for 4 weeks to complete the LSVT BIG protocol. Patient/ Caregiver education and instruction: self care, activity modification and exercises    [x]  Plan of care has been reviewed with PTA        Certification Period: 7/6/22-10/6/22  Castro Clark DPT 7/8/2022     ________________________________________________________________________    I certify that the above Therapy Services are being furnished while the patient is under my care. I agree with the treatment plan and certify that this therapy is necessary.     Physician's Signature:____________________  Date:____________Time: _________         Negrito Banks MD

## 2022-08-17 ENCOUNTER — OFFICE VISIT (OUTPATIENT)
Dept: CARDIOLOGY CLINIC | Age: 72
End: 2022-08-17
Payer: MEDICARE

## 2022-08-17 VITALS
HEART RATE: 58 BPM | BODY MASS INDEX: 34.51 KG/M2 | DIASTOLIC BLOOD PRESSURE: 88 MMHG | OXYGEN SATURATION: 98 % | HEIGHT: 69 IN | SYSTOLIC BLOOD PRESSURE: 139 MMHG | WEIGHT: 233 LBS

## 2022-08-17 DIAGNOSIS — E78.2 MIXED HYPERLIPIDEMIA: ICD-10-CM

## 2022-08-17 DIAGNOSIS — I25.10 CORONARY ARTERY DISEASE INVOLVING NATIVE CORONARY ARTERY OF NATIVE HEART WITHOUT ANGINA PECTORIS: ICD-10-CM

## 2022-08-17 DIAGNOSIS — I10 ESSENTIAL HYPERTENSION: ICD-10-CM

## 2022-08-17 DIAGNOSIS — I48.0 PAF (PAROXYSMAL ATRIAL FIBRILLATION) (HCC): Primary | ICD-10-CM

## 2022-08-17 PROCEDURE — G8536 NO DOC ELDER MAL SCRN: HCPCS | Performed by: INTERNAL MEDICINE

## 2022-08-17 PROCEDURE — G8754 DIAS BP LESS 90: HCPCS | Performed by: INTERNAL MEDICINE

## 2022-08-17 PROCEDURE — G8752 SYS BP LESS 140: HCPCS | Performed by: INTERNAL MEDICINE

## 2022-08-17 PROCEDURE — G8428 CUR MEDS NOT DOCUMENT: HCPCS | Performed by: INTERNAL MEDICINE

## 2022-08-17 PROCEDURE — 99214 OFFICE O/P EST MOD 30 MIN: CPT | Performed by: INTERNAL MEDICINE

## 2022-08-17 PROCEDURE — 1123F ACP DISCUSS/DSCN MKR DOCD: CPT | Performed by: INTERNAL MEDICINE

## 2022-08-17 PROCEDURE — G8417 CALC BMI ABV UP PARAM F/U: HCPCS | Performed by: INTERNAL MEDICINE

## 2022-08-17 PROCEDURE — G8432 DEP SCR NOT DOC, RNG: HCPCS | Performed by: INTERNAL MEDICINE

## 2022-08-17 PROCEDURE — 3017F COLORECTAL CA SCREEN DOC REV: CPT | Performed by: INTERNAL MEDICINE

## 2022-08-17 PROCEDURE — 1101F PT FALLS ASSESS-DOCD LE1/YR: CPT | Performed by: INTERNAL MEDICINE

## 2022-08-17 PROCEDURE — 93005 ELECTROCARDIOGRAM TRACING: CPT | Performed by: INTERNAL MEDICINE

## 2022-08-17 PROCEDURE — 93010 ELECTROCARDIOGRAM REPORT: CPT | Performed by: INTERNAL MEDICINE

## 2022-08-17 PROCEDURE — G0463 HOSPITAL OUTPT CLINIC VISIT: HCPCS | Performed by: INTERNAL MEDICINE

## 2022-08-17 NOTE — PROGRESS NOTES
Chief Complaint   Patient presents with    Follow-up     6 MONTHS    Cholesterol Problem    Hypertension    Coronary Artery Disease    Irregular Heart Beat     PAF     Visit Vitals  /88 (BP 1 Location: Left upper arm, BP Patient Position: Sitting)   Pulse (!) 58   Ht 5' 9\" (1.753 m)   Wt 233 lb (105.7 kg)   SpO2 98%   BMI 34.41 kg/m²     Chest pain denied     Palpations denied    SOB denied    Dizziness denied    Swelling in hands/feet HAS GOTTEN BETTER    Recent hospital stays denied     BEING WORKED UP FOR [address] [place] [address] 107 January, NEUROLOGY

## 2022-08-17 NOTE — PROGRESS NOTES
Office Follow-up    NAME: Hayder Buchanan   :  1950  MRM:  [id number]    Date:  2022            Assessment and Plan:     Atrial fibrillation (DCCV 2021, prior DCCV ): He is now in sinus rhythm with sinus bradycardia. Amio was stopped 2022. Continue metoprolol  mg p.o. daily. Is not on chronic anticoagulation as he cannot afford Eliquis and in the past we had offered him Coumadin but he is not interested. He is currently on aspirin 81 mg p.o. daily which he will continue. CAD: Restented RCA in : This is stable. Continue current medications. There is residual LAD 60% lesion is stable without any ischemia on stress test last noted in    Hypertension: Blood pressure is elevated today. Repeat BP in office was 139/88. Continue Lisinopril, HCTZ 25mg po daily and Toprol-XL. Dyslipidemia: Unable to take statins because of myalgias. Dietary control of cholesterol. [facility] is follow-up primary care physician. Sleep apnea: Uses dental device. Slow mobility: Possible Parkinsons. Await second opinion. See Dr. Blanca Cristina in 6 months. He got   in 2019 on May 16th. He is a nurse and retired in . ATTENTION:   This medical record was transcribed using an electronic medical records/speech recognition system. Although proofread, it may and can contain electronic, spelling and other errors. Corrections may be executed at a later time. Please feel free to contact us for any clarifications as needed. Subjective:     Hayder Buchanan, a 70y.o. year-old who presents for followup. He recently underwent A. fib cardioversion on 2021. This was his second cardioversion. He converted to sinus rhythm. Has been feeling fatigued and also have some balance issues. No chest pain or SOB.        EKG in our office today demonstrated sinus rhythm with sinus bradycardia with heart rate of 56 bpm with poor R wave progression with left axis deviation. Exam:     Physical Exam:  Visit Vitals  /88 (BP 1 Location: Left upper arm, BP Patient Position: Sitting)   Pulse (!) 58   Ht 5' 9\" (1.753 m)   Wt 233 lb (105.7 kg)   SpO2 98%   BMI 34.41 kg/m²     General appearance - alert, well appearing, and in no distress  Mental status - affect appropriate to mood  Eyes - sclera anicteric, moist mucous membranes  Neck - supple, no significant adenopathy  Chest - clear to auscultation, no wheezes, rales or rhonchi  Heart - normal rate, regular rhythm, normal S1, S2, no murmurs, rubs, clicks or gallops  Abdomen - soft, nontender, nondistended, no masses or organomegaly  Extremities - peripheral pulses normal, no pedal edema  Skin - normal coloration  no rashes    Medications:     Current Outpatient Medications   Medication Sig    carbidopa-levodopa (SINEMET)  mg per tablet Take 2 Tablets by mouth three (3) times daily for 90 days. For Parkinson's. lisinopriL (PRINIVIL, ZESTRIL) 10 mg tablet TAKE ONE TABLET BY MOUTH DAILY    chlorthalidone (HYGROTON) 25 mg tablet Take 1 Tablet by mouth daily. metoprolol succinate (TOPROL-XL) 100 mg tablet Take one tablet by  Mouth daily    aspirin delayed-release 81 mg tablet Take 81 mg by mouth two (2) times a day. ergocalciferol (ERGOCALCIFEROL) 50,000 unit capsule TAKE 1 CAPSULE BY MOUTH 3 TIMES A WEEK (Patient taking differently: Take 50,000 Units by mouth. Twice a week)    SILDENAFIL CITRATE (VIAGRA PO) Take 20 mg by mouth as needed. naproxen (NAPROSYN) 250 mg tablet Take 250 mg by mouth as needed. multivitamin (ONE A DAY) tablet Take 1 Tab by mouth daily. No current facility-administered medications for this visit. Diagnostic Data Review:       4/3/17 Spect lexiscan: LVEF 55%; small area of mod intensity basal to mid inferior wall infarct with mild vesna-infarct ischemia.      Stress test cardiolite:5/4/11- basal and mid inferior ischemia, EF 56%    Cardiac catheterization:9/29/2011- LVEDP 23, LVG EF 50%, moderate inferior HK, LM normal, LAD calcified prox 60%, circumflex prox stent patent, OM 1 prox stent patent, RCA long prox/mid stent with 99% in-stent restenosis, T2 flow. Distal short stent probably patent but slow filling hampers assessment   Stent insertion:9/29/11- PCI of RCA with 3.0 x 18 mm Xience (mid) and 2.75 x 15 Xience (distal)      Lab Review:     Lab Results   Component Value Date/Time    Cholesterol, total 163 09/30/2011 06:10 AM    Cholesterol, Total 159 05/22/2017 12:00 AM    HDL Cholesterol 43 09/30/2011 06:10 AM    LDL,Direct 45 10/05/2010 08:23 AM    LDL, calculated 84.2 09/30/2011 06:10 AM    Triglyceride 179 (H) 09/30/2011 06:10 AM    CHOL/HDL Ratio 3.8 09/30/2011 06:10 AM     Lab Results   Component Value Date/Time    Creatinine 0.94 04/01/2021 10:45 AM     Lab Results   Component Value Date/Time    BUN 18 04/01/2021 10:45 AM     Lab Results   Component Value Date/Time    Potassium 4.3 04/01/2021 10:45 AM     Lab Results   Component Value Date/Time    Hemoglobin A1c 6.3 (H) 10/16/2010 06:37 AM    Hemoglobin A1c, External 6.2 02/21/2015 12:00 AM     Lab Results   Component Value Date/Time    HGB 14.5 11/21/2013 05:30 PM     Lab Results   Component Value Date/Time    PLATELET 346 07/32/2205 05:30 PM     No results for input(s): CPK, CKMB, TROIQ in the last 72 hours. No lab exists for component: CKQMB, CPKMB  -           ___________________________________________________    Clau Pierson.  Fannie Bynum MD, [facility]

## 2022-09-12 ENCOUNTER — HOSPITAL ENCOUNTER (OUTPATIENT)
Dept: PHYSICAL THERAPY | Age: 72
Discharge: HOME | End: 2022-09-12
Payer: MEDICARE

## 2022-09-12 PROCEDURE — 97112 NEUROMUSCULAR REEDUCATION: CPT

## 2022-09-12 PROCEDURE — 97110 THERAPEUTIC EXERCISES: CPT

## 2022-09-13 ENCOUNTER — HOSPITAL ENCOUNTER (OUTPATIENT)
Dept: PHYSICAL THERAPY | Age: 72
Discharge: HOME | End: 2022-09-13
Payer: MEDICARE

## 2022-09-13 PROCEDURE — 97112 NEUROMUSCULAR REEDUCATION: CPT

## 2022-09-13 PROCEDURE — 97110 THERAPEUTIC EXERCISES: CPT

## 2022-09-13 NOTE — PROGRESS NOTES
Physical Therapy LSVT BIG DAILY NOTE    Patient Name: Reilly Adkins  Date:2022  : 1950  [x]  Patient  Verified  Payor: Ritchie Butcher / Plan: [place] / Product Type: Medicare /    In time:11:52 a  Out time: 12:48  Total Treatment Time (min): 56  Total Timed Codes (min): 56  1:1 Treatment Time ( [address] only): 64  Visit #:  3    Treatment Area: Parkinson's disease [G20]    SUBJECTIVE  Pain Level (0-10 scale): 0  Any medication changes, allergies to medications, adverse drug reactions, diagnosis change, or new procedure performed?: [x] No    [x] Yes (see summary sheet for update)  Subjective functional status/changes:   [] No changes reported  Patient reports that yesterday was \"good but tiring. \"  He was able to fill out his functional task form. His HEP.        OBJECTIVE      30 min Therapeutic Exercise:  [x] See flow sheet :   Rationale: increase ROM and increase strength to improve the patients ability to sit, stand, lift, carry, reach, ambulate and complete ADL's    26 min Neuromuscular Re-education:  [x]  See flow sheet :    Rationale: improve coordination, improve balance, and increase proprioception  to improve the patients ability to sit, stand, lift, carry, reach, ambulate and complete ADL's          With   [] TE   [] TA   [] neuro   [] other: Patient Education: [x] Review HEP    [] Progressed/Changed HEP based on:   [] positioning  [] body mechanics   [] transfers   [] heat/ice application    [] other:      Other Objective/Functional Measures:   Standing rotation and reach exercise performed in parallel bars today    Cues for small excursion of step with increased effort into step/height/stomp increased stability with all steps    Patient requiring intermittent CGA to correct minor LOB    Patient with good performance of sit to stands, next session ready to progress to lower or noncompliant surface     Patient to bring pajama pants to step into next session    BIG walking well performed, ready to increase duration of ambulation or cognitive challenge next session        Daily Exercises/Functional Components: 46 minutes    Big Walking/Hierarchy Tasks: 10 minutes    Carryover Assignment: Gather pajama pants to bring next session     Pain Level (0-10 scale) post treatment: 0    Level of Perceived Exertion post treatment: 7-8    ASSESSMENT/Changes in Function: Patient with tendency to flex L pinky, improved performance on BIG hands throughout session but continues to require verbal cues intermittently. Tendency to swing L arm into abduction rather than extension, improved with cues as well. Patient will benefit from skilled PT services to modify and progress therapeutic interventions, address functional mobility deficits, address ROM deficits, address strength deficits, analyze and cue movement patterns, analyze and modify body mechanics/ergonomics, assess and modify postural abnormalities, address imbalance/dizziness, and instruct in home and community integration to address rehab goals per plan of care    Progress towards goals / Updated goals:   Increased balance with performance of stepping exercises    PLAN  [x]  Upgrade activities as tolerated     [x]  Continue plan of care  [x]  Update interventions per flow sheet       []  Discharge due to:_  []  Other:_      Melany Dallas DPT 9/13/2022

## 2022-09-14 ENCOUNTER — HOSPITAL ENCOUNTER (OUTPATIENT)
Dept: PHYSICAL THERAPY | Age: 72
Discharge: HOME | End: 2022-09-14
Payer: MEDICARE

## 2022-09-14 PROCEDURE — 97112 NEUROMUSCULAR REEDUCATION: CPT

## 2022-09-14 PROCEDURE — 97110 THERAPEUTIC EXERCISES: CPT

## 2022-09-14 NOTE — PROGRESS NOTES
Physical Therapy LSVT BIG DAILY NOTE    Patient Name: Thony Buitrago  Date:2022  : 1950  [x]  Patient  Verified  Payor: [place] MEDICARE / Plan: [place] MEDICARE PART A & B / Product Type: Medicare /    In time:11:58 a  Out time: 12:54  Total Treatment Time (min): 56  Total Timed Codes (min): 56  1:1 Treatment Time ( [address] only): 64  Visit #:  4    Treatment Area: Parkinson's disease [G20]    SUBJECTIVE  Pain Level (0-10 scale): 0  Any medication changes, allergies to medications, adverse drug reactions, diagnosis change, or new procedure performed?: [x] No    [x] Yes (see summary sheet for update)  Subjective functional status/changes:   [] No changes reported  Patient reports he has been doing a little better with stepping exercises with less LOB.        OBJECTIVE      30 min Therapeutic Exercise:  [x] See flow sheet :   Rationale: increase ROM and increase strength to improve the patients ability to sit, stand, lift, carry, reach, ambulate and complete ADL's    26 min Neuromuscular Re-education:  [x]  See flow sheet :    Rationale: improve coordination, improve balance, and increase proprioception  to improve the patients ability to sit, stand, lift, carry, reach, ambulate and complete ADL's          With   [] TE   [] TA   [] neuro   [] other: Patient Education: [x] Review HEP    [] Progressed/Changed HEP based on:   [] positioning  [] body mechanics   [] transfers   [] heat/ice application    [] other:      Other Objective/Functional Measures:   Standing rotation and reach exercise performed in parallel bars today, cue for full rotation st hips and feet and for complete movements    Pt rushing through several exercises today, cues required for full controled movements to prevent LOB    Cues for small excursion of step with increased effort into step/height/stomp increased stability with all steps    SBA-CGA for correcting minor LOB, able to prevent LOB with verbal cues for effort over large step excursions    Patient with good performance of sit to stand on lower block surface with and without foam, min challenge reported with foam, removed and increased effort required, may add pillow next visit for increasing compliance of surface without increasing height. Pt initiated stepping into pajama pants with verbal cues for quick and high effort to push LE through pant leg, increased difficulty with balance over mechanisms of action, min difficulty with elevating LE with increased fatigue    BIG walking well performed with cognitive challenge of counting back by 3's, decreased speed of ambulation and effort of stomps, arm swing and big hands noted, mod verbal cues required to correct    Daily Exercises/Functional Components: 46 minutes    Big Walking/Hierarchy Tasks: 10 minutes    Carryover Assignment: BIG hands with walking and stretching while at choir     Pain Level (0-10 scale) post treatment: 0    Level of Perceived Exertion post treatment: 7-8    ASSESSMENT/Changes in Function: Patient with tendency to flex R>L pinky, improved performance on BIG hands throughout session but continues to require verbal cues intermittently. Patient will benefit from skilled PT services to modify and progress therapeutic interventions, address functional mobility deficits, address ROM deficits, address strength deficits, analyze and cue movement patterns, analyze and modify body mechanics/ergonomics, assess and modify postural abnormalities, address imbalance/dizziness, and instruct in home and community integration to address rehab goals per plan of care    Progress towards goals / Updated goals:  Improved performance of all standing interventions with verbal cues required for hand and complete BIG movements throughout as patient attempted to rush through activities today.     PLAN  [x]  Upgrade activities as tolerated     [x]  Continue plan of care  [x]  Update interventions per flow sheet       []  Discharge due to:_  []  Other:_      Cholo Parker, PTA 9/14/2022

## 2022-09-15 ENCOUNTER — HOSPITAL ENCOUNTER (OUTPATIENT)
Dept: PHYSICAL THERAPY | Age: 72
Discharge: HOME | End: 2022-09-15
Payer: MEDICARE

## 2022-09-15 PROCEDURE — 97112 NEUROMUSCULAR REEDUCATION: CPT

## 2022-09-15 PROCEDURE — 97110 THERAPEUTIC EXERCISES: CPT

## 2022-09-15 NOTE — PROGRESS NOTES
Physical Therapy LSVT BIG DAILY NOTE    Patient Name: Anel Diggs  Date:9/15/2022  : 1950  [x]  Patient  Verified  Payor: [place] MEDICARE / Plan: [place] MEDICARE PART A & B / Product Type: Medicare /    In time:11:50 a  Out time: 12:55  Total Treatment Time (min): 65  Total Timed Codes (min): 65  1:1 Treatment Time ( [address] only): 65  Visit #:  5    Treatment Area: Parkinson's disease [G20]    SUBJECTIVE  Pain Level (0-10 scale): 0  Any medication changes, allergies to medications, adverse drug reactions, diagnosis change, or new procedure performed?: [x] No    [x] Yes (see summary sheet for update)  Subjective functional status/changes:   [] No changes reported  Patient reports that he is noticing improvement in getting in and out of the car and going up and down the stairs. His daughter noticed how his sit to stand was different.        OBJECTIVE      30 min Therapeutic Exercise:  [x] See flow sheet :   Rationale: increase ROM and increase strength to improve the patients ability to sit, stand, lift, carry, reach, ambulate and complete ADL's    35 min Neuromuscular Re-education:  [x]  See flow sheet :    Rationale: improve coordination, improve balance, and increase proprioception  to improve the patients ability to sit, stand, lift, carry, reach, ambulate and complete ADL's          With   [] TE   [] TA   [] neuro   [] other: Patient Education: [x] Review HEP    [] Progressed/Changed HEP based on:   [] positioning  [] body mechanics   [] transfers   [] heat/ice application    [] other:      Other Objective/Functional Measures:     BIG walking well performed with cognitive challenge of listing, noting decreased step length, speed, and R>L arm swing and finger spread     Sit to stand from 10\" (?) block with pillow, 1 retro LOB when pt did not lean    Marching with doubled height ute     Able to perform all exercises without UE and outside of // bars       Daily Exercises/Functional Components: 50 minutes    Big Walking/Hierarchy Tasks: 15 minutes    Carryover Assignment: See homework sheet     Pain Level (0-10 scale) post treatment: 0    Level of Perceived Exertion post treatment: 7-8    ASSESSMENT/Changes in Function: Patient with improvement in all stepping exercises observed, no LOB and able to progress sideways rock and reach outside // bars today. Able to maneuver stairs without difficulty. Patient planning to bring more difficult pants next session. Patient was reminded of HEP performance 2x/day over the weekend. Patient will benefit from skilled PT services to modify and progress therapeutic interventions, address functional mobility deficits, address ROM deficits, address strength deficits, analyze and cue movement patterns, analyze and modify body mechanics/ergonomics, assess and modify postural abnormalities, address imbalance/dizziness, and instruct in home and community integration to address rehab goals per plan of care    Progress towards goals / Updated goals:  Able to maneuver stairs without difficulty.      PLAN  [x]  Upgrade activities as tolerated     [x]  Continue plan of care  [x]  Update interventions per flow sheet       []  Discharge due to:_  []  Other:_      Iban Elam DPT 9/15/2022

## 2022-09-19 ENCOUNTER — HOSPITAL ENCOUNTER (OUTPATIENT)
Dept: PHYSICAL THERAPY | Age: 72
Discharge: HOME | End: 2022-09-19
Payer: MEDICARE

## 2022-09-19 PROCEDURE — 97112 NEUROMUSCULAR REEDUCATION: CPT

## 2022-09-19 PROCEDURE — 97110 THERAPEUTIC EXERCISES: CPT

## 2022-09-19 RX ORDER — METOPROLOL SUCCINATE 100 MG/1
TABLET, EXTENDED RELEASE ORAL
Qty: 90 TABLET | Refills: 1 | Status: SHIPPED | OUTPATIENT
Start: 2022-09-19

## 2022-09-19 NOTE — PROGRESS NOTES
Physical Therapy LSVT BIG DAILY NOTE    Patient Name: Ruby Norton  GVFT:[id number]  : 1950  [x]  Patient  Verified  Payor: Aidan Nicole / Plan: [place] MEDICARE PART A & B / Product Type: Medicare /    In time:10:50 a  Out time: 11:55a  Total Treatment Time (min): 65  Total Timed Codes (min): 65  1:1 Treatment Time ( [address] only): 72  Visit #:  6    Treatment Area: Parkinson's disease [G20]    SUBJECTIVE  Pain Level (0-10 scale): 0  Any medication changes, allergies to medications, adverse drug reactions, diagnosis change, or new procedure performed?: [x] No    [x] Yes (see summary sheet for update)  Subjective functional status/changes:   [] No changes reported  Patient reports not sleeping well last night and is a little more tired this morning. Patient reports he continues to have occasional stiffness in the mornings. Patient reports he was able to get to the exercises each day, but Friday. Patient states his wife made a comment on how well he was doing getting dressed for [employer]orship yesterday. Patient states he went to a festival Saturday and had balance difficulties maneuvering around the crowd and obstacles.       OBJECTIVE      30 min Therapeutic Exercise:  [x] See flow sheet :   Rationale: increase ROM and increase strength to improve the patients ability to sit, stand, lift, carry, reach, ambulate and complete ADL's    35 min Neuromuscular Re-education:  [x]  See flow sheet :    Rationale: improve coordination, improve balance, and increase proprioception  to improve the patients ability to sit, stand, lift, carry, reach, ambulate and complete ADL's          With   [] TE   [] TA   [] neuro   [] other: Patient Education: [x] Review HEP    [] Progressed/Changed HEP based on:   [] positioning  [] body mechanics   [] transfers   [] heat/ice application    [] other:      Other Objective/Functional Measures:     BIG walking well performed with cognitive challenge of listing, noting decreased step length, speed, and R>L arm swing and finger spread     Sit to stand from 10-12\" block with pillow, 1 retro LOB when pt did not lean    Obstacle course set up with step up and over, hurdles and cone maneuvering, pt required verbal cues for decreased speed to improve control and decrease LOB and instability with improvements noted, pt prefers to circumduct BLE over level 2 hurdles, verbal cues to correct with 50% improvements, verbal cues for big hands and arm swing throughout course. Able to perform all exercises without UE and outside of // bars, several episodes of lightheadedness reported with standing interventions, may be secondary to medications, however this improved with breath coordination with movements      Daily Exercises/Functional Components: 50 minutes    Big Walking/Hierarchy Tasks: 15 minutes    Carryover Assignment: Foot to knee to don/doff shoes/socks    Pain Level (0-10 scale) post treatment: 0    Level of Perceived Exertion post treatment: 7-8    ASSESSMENT/Changes in Function: Patient with improvement in all stepping exercises observed, no LOB and able to progress sideways rock and reach outside // bars today. Able to maneuver full flight of stairs with verbal cues for arm swing to ascend steps. Patient planning to bring more difficult pants next session. Patient was reminded of HEP performance. Patient will benefit from skilled PT services to modify and progress therapeutic interventions, address functional mobility deficits, address ROM deficits, address strength deficits, analyze and cue movement patterns, analyze and modify body mechanics/ergonomics, assess and modify postural abnormalities, address imbalance/dizziness, and instruct in home and community integration to address rehab goals per plan of care    Progress towards goals / Updated goals:  Able to maneuver obstacle course with mod instability, improved with verbal cuing.     PLAN  [x]  Upgrade activities as tolerated     [x]  Continue plan of care  [x]  Update interventions per flow sheet       []  Discharge due to:_  []  Other:_      Shoshana Martino, PTA 9/19/2022

## 2022-09-20 ENCOUNTER — HOSPITAL ENCOUNTER (OUTPATIENT)
Dept: PHYSICAL THERAPY | Age: 72
Discharge: HOME | End: 2022-09-20
Payer: MEDICARE

## 2022-09-20 PROCEDURE — 97110 THERAPEUTIC EXERCISES: CPT

## 2022-09-20 PROCEDURE — 97112 NEUROMUSCULAR REEDUCATION: CPT

## 2022-09-20 NOTE — PROGRESS NOTES
Physical Therapy LSVT BIG DAILY NOTE    Patient Name: Regina Plaza  Date:2022  : 1950  [x]  Patient  Verified  Payor: Adamaris Burrows / Plan: [place] MEDICARE PART A & B / Product Type: Medicare /    In time:11:45 a  Out time: 12:45 p  Total Treatment Time (min): 60  Total Timed Codes (min): 60  1:1 Treatment Time ([facility] only): 60  Visit #:  7    Treatment Area: Parkinson's disease [G20]    SUBJECTIVE  Pain Level (0-10 scale): 0  Any medication changes, allergies to medications, adverse drug reactions, diagnosis change, or new procedure performed?: [x] No    [x] Yes (see summary sheet for update)  Subjective functional status/changes:   [] No changes reported  Patient reports that he had a tough night last night due to family issues. He was able to arise from a low lounge chair outside without difficulty but still struggles with getting out of bed. He is tight in the mornings.       OBJECTIVE      30 min Therapeutic Exercise:  [x] See flow sheet :   Rationale: increase ROM and increase strength to improve the patients ability to sit, stand, lift, carry, reach, ambulate and complete ADL's    30 min Neuromuscular Re-education:  [x]  See flow sheet :    Rationale: improve coordination, improve balance, and increase proprioception  to improve the patients ability to sit, stand, lift, carry, reach, ambulate and complete ADL's          With   [] TE   [] TA   [] neuro   [] other: Patient Education: [x] Review HEP    [] Progressed/Changed HEP based on:   [] positioning  [] body mechanics   [] transfers   [] heat/ice application    [] other:      Other Objective/Functional Measures:     Stepping and rock and reach exercises performed on 1 mat layer, required cues for slower more effortful performance     Improved effort with cues to step leg through pants leg in 1 go, performed with pajama pants again     Patient without difficulty donning shoes, trading out foot to knee for handwriting exercise     BIG walking well performed with cognitive challenge of finding numbered chairs in tight spaces    1 episode of tripping over his feet when performing a quick 180 degree turn, reviewed intentional 1/4 steps       Daily Exercises/Functional Components: 45 minutes    Big Walking/Hierarchy Tasks: 15 minutes    Carryover Assignment: BIG bed mobility     Pain Level (0-10 scale) post treatment: 0    Level of Perceived Exertion post treatment: 7-8    ASSESSMENT/Changes in Function: Patient without lightheadedness throughout session, able to advance difficulty and repetitions of many exercises. Patient will benefit from skilled PT services to modify and progress therapeutic interventions, address functional mobility deficits, address ROM deficits, address strength deficits, analyze and cue movement patterns, analyze and modify body mechanics/ergonomics, assess and modify postural abnormalities, address imbalance/dizziness, and instruct in home and community integration to address rehab goals per plan of care    Progress towards goals / Updated goals:  Handwriting added to functional component tasks.      PLAN  [x]  Upgrade activities as tolerated     [x]  Continue plan of care  [x]  Update interventions per flow sheet       []  Discharge due to:_  []  Other:_      Rock Salomon DPT 9/20/2022

## 2022-09-21 ENCOUNTER — HOSPITAL ENCOUNTER (OUTPATIENT)
Dept: PHYSICAL THERAPY | Age: 72
Discharge: HOME | End: 2022-09-21
Payer: MEDICARE

## 2022-09-21 PROCEDURE — 97112 NEUROMUSCULAR REEDUCATION: CPT

## 2022-09-21 PROCEDURE — 97110 THERAPEUTIC EXERCISES: CPT

## 2022-09-21 NOTE — PROGRESS NOTES
Physical Therapy LSVT BIG DAILY NOTE    Patient Name: Ruby Norton  Date:2022  : 1950  [x]  Patient  Verified  Payor: Aidan Nicole / Plan: [place] / Product Type: Medicare /    In time:10:50 a  Out time: 11:55 p  Total Treatment Time (min): 65  Total Timed Codes (min): 65  1:1 Treatment Time ( [address] only): 65  Visit #:  8    Treatment Area: Parkinson's disease [G20]    SUBJECTIVE  Pain Level (0-10 scale): 0  Any medication changes, allergies to medications, adverse drug reactions, diagnosis change, or new procedure performed?: [x] No    [x] Yes (see summary sheet for update)  Subjective functional status/changes:   [] No changes reported  Patient reports completing his HEP this morning and is feeling a little better today. OBJECTIVE      30 min Therapeutic Exercise:  [x] See flow sheet :   Rationale: increase ROM and increase strength to improve the patients ability to sit, stand, lift, carry, reach, ambulate and complete ADL's    35 min Neuromuscular Re-education:  [x]  See flow sheet :    Rationale: improve coordination, improve balance, and increase proprioception  to improve the patients ability to sit, stand, lift, carry, reach, ambulate and complete ADL's          With   [] TE   [] TA   [] neuro   [] other: Patient Education: [x] Review HEP    [] Progressed/Changed HEP based on:   [] positioning  [] body mechanics   [] transfers   [] heat/ice application    [] other:      Other Objective/Functional Measures:      All standing exercises performed on 1 mat layer with min increased balance difficulties, 1-2 LOB with backwards step and forward/side rock and reach,improved breath coordination with verbal cues with decreased SOB reported,     Big walking performed with 2# weights UE with cognitive challenge; obstacle course also performed with scanning surroundings, cone weave forward/backward/sideways, and sit to stand to cone weave, pt required verbal cues for pace of task accomplishment to prevent LOB, deliberate 1/4 turning, deliberate big movements, big hands and arm swing with UE weighted ambulation      Writing functional task performed well, may trial writing with lined paper to assess legibility writing in smaller spaces    Pt did not bring difficult to alejandro pants to visit today      Daily Exercises/Functional Components: 45 minutes    Big Walking/Hierarchy Tasks: 20 minutes    Carryover Assignment: BIG side step to sit at lunch     Pain Level (0-10 scale) post treatment: 0    Level of Perceived Exertion post treatment: 7-8    ASSESSMENT/Changes in Function: Patient without lightheadedness throughout session, able to advance difficulty and repetitions of many exercises. Patient will benefit from skilled PT services to modify and progress therapeutic interventions, address functional mobility deficits, address ROM deficits, address strength deficits, analyze and cue movement patterns, analyze and modify body mechanics/ergonomics, assess and modify postural abnormalities, address imbalance/dizziness, and instruct in home and community integration to address rehab goals per plan of care    Progress towards goals / Updated goals:  Good tolerance for advanced reps of several exercises and addition of balance challenge with daily exercises.      PLAN  [x]  Upgrade activities as tolerated     [x]  Continue plan of care  [x]  Update interventions per flow sheet       []  Discharge due to:_  []  Other:_      Lillian Lomeli, PTA 9/21/2022

## 2022-09-22 ENCOUNTER — HOSPITAL ENCOUNTER (OUTPATIENT)
Dept: PHYSICAL THERAPY | Age: 72
Discharge: HOME | End: 2022-09-22
Payer: MEDICARE

## 2022-09-22 PROCEDURE — 97110 THERAPEUTIC EXERCISES: CPT

## 2022-09-22 PROCEDURE — 97112 NEUROMUSCULAR REEDUCATION: CPT

## 2022-09-22 NOTE — PROGRESS NOTES
Physical Therapy LSVT BIG DAILY NOTE    Patient Name: Thony Buitrago  Date:2022  : 1950  [x]  Patient  Verified  Payor: [place] MEDICARE / Plan: [place] MEDICARE PART A & B / Product Type: Medicare /    In time:11:50 a  Out time: 12:55 p  Total Treatment Time (min): 65  Total Timed Codes (min): 65  1:1 Treatment Time ( [address] only): 72  Visit #:  9    Treatment Area: Parkinson's disease [G20]    SUBJECTIVE  Pain Level (0-10 scale): 0  Any medication changes, allergies to medications, adverse drug reactions, diagnosis change, or new procedure performed?: [x] No    [x] Yes (see summary sheet for update)  Subjective functional status/changes:   [] No changes reported  Patient reports that he was out-walking his wife yesterday. OBJECTIVE      30 min Therapeutic Exercise:  [x] See flow sheet :   Rationale: increase ROM and increase strength to improve the patients ability to sit, stand, lift, carry, reach, ambulate and complete ADL's    35 min Neuromuscular Re-education:  [x]  See flow sheet :    Rationale: improve coordination, improve balance, and increase proprioception  to improve the patients ability to sit, stand, lift, carry, reach, ambulate and complete ADL's          With   [] TE   [] TA   [] neuro   [] other: Patient Education: [x] Review HEP    [] Progressed/Changed HEP based on:   [] positioning  [] body mechanics   [] transfers   [] heat/ice application    [] other:      Other Objective/Functional Measures:      All standing exercises performed on 1 mat layer with min increased balance difficulties, only LOB with sideways rock and reach    Performed tow taps with distance, big walking across unstable targets     Writing performed both with sentences and note taking task    Unable to stand from step and 5 pillows, will further progress sit to stand next session    Pt did not bring difficult to alejandro pants to visit today    Daily Exercises/Functional Components: 45 minutes    Big Walking/Hierarchy Tasks: 20 minutes    Carryover Assignment: note taking to the news, BIG walking, Big bed mobility, make your own     Pain Level (0-10 scale) post treatment: 0    Level of Perceived Exertion post treatment: 7-8    ASSESSMENT/Changes in Function:     Patient will benefit from skilled PT services to modify and progress therapeutic interventions, address functional mobility deficits, address ROM deficits, address strength deficits, analyze and cue movement patterns, analyze and modify body mechanics/ergonomics, assess and modify postural abnormalities, address imbalance/dizziness, and instruct in home and community integration to address rehab goals per plan of care    Progress towards goals / Updated goals:  Able to perform weight shift and toe tap with good control, will advance to donning pants when he brings them to therapy    PLAN  [x]  Upgrade activities as tolerated     [x]  Continue plan of care  [x]  Update interventions per flow sheet       []  Discharge due to:_  []  Other:_      Tommy Bravo DPT 9/22/2022

## 2022-09-26 ENCOUNTER — HOSPITAL ENCOUNTER (OUTPATIENT)
Dept: PHYSICAL THERAPY | Age: 72
Discharge: HOME | End: 2022-09-26
Payer: MEDICARE

## 2022-09-26 PROCEDURE — 97112 NEUROMUSCULAR REEDUCATION: CPT

## 2022-09-26 PROCEDURE — 97110 THERAPEUTIC EXERCISES: CPT

## 2022-09-26 NOTE — PROGRESS NOTES
Physical Therapy LSVT BIG DAILY NOTE    Patient Name: Trevor Sanchezure  Date:2022  : 1950  [x]  Patient  Verified  Payor:  Rashiwandy / Plan: [place] MEDICARE PART A & B / Product Type: Medicare /    In time:10:50a  Out time: 11:55p  Total Treatment Time (min): 65  Total Timed Codes (min): 65  1:1 Treatment Time ( [address] only): 72  Visit #:  10    Treatment Area: Parkinson's disease [G20]    SUBJECTIVE  Pain Level (0-10 scale): 0  Any medication changes, allergies to medications, adverse drug reactions, diagnosis change, or new procedure performed?: [x] No    [x] Yes (see summary sheet for update)  Subjective functional status/changes:   [] No changes reported  Patient reports his family noticed how he is able to get out of low chairs better and overall movements are getting bigger and easier. OBJECTIVE      30 min Therapeutic Exercise:  [x] See flow sheet :   Rationale: increase ROM and increase strength to improve the patients ability to sit, stand, lift, carry, reach, ambulate and complete ADL's    35 min Neuromuscular Re-education:  [x]  See flow sheet :    Rationale: improve coordination, improve balance, and increase proprioception  to improve the patients ability to sit, stand, lift, carry, reach, ambulate and complete ADL's          With   [] TE   [] TA   [] neuro   [] other: Patient Education: [x] Review HEP    [] Progressed/Changed HEP based on:   [] positioning  [] body mechanics   [] transfers   [] heat/ice application    [] other:      Other Objective/Functional Measures:      All standing exercises performed on 2 mat layer with min increased balance difficulties    Sit to stand performed with feet on 3 mat layers with standing from bosu, mod to max difficulty with several retro falls secondary to decrease trunk flexion throughout movement     Writing performed while holding conversation, mod difficulty    Big walking through obstacles including soft/uneven surfaces, hurdles, and cones    Donning difficulty pants performed in seated with mod difficulty secondary to foot catching on material, performed with big forceful movements as appropriate      Daily Exercises/Functional Components: 45 minutes    Big Walking/Hierarchy Tasks: 20 minutes    Carryover Assignment: big walking and good turns cleaning up yard    Pain Level (0-10 scale) post treatment: 0    Level of Perceived Exertion post treatment: 7-8    ASSESSMENT/Changes in Function:     Patient will benefit from skilled PT services to modify and progress therapeutic interventions, address functional mobility deficits, address ROM deficits, address strength deficits, analyze and cue movement patterns, analyze and modify body mechanics/ergonomics, assess and modify postural abnormalities, address imbalance/dizziness, and instruct in home and community integration to address rehab goals per plan of care    Progress towards goals / Updated goals:  Patient making steady progress through LSVT program with evidence of good carry over and integration into home activities and ADL's.      PLAN  [x]  Upgrade activities as tolerated     [x]  Continue plan of care  [x]  Update interventions per flow sheet       []  Discharge due to:_  []  Other:_      Carmela Mendez, PTA 9/26/2022

## 2022-09-27 ENCOUNTER — HOSPITAL ENCOUNTER (OUTPATIENT)
Dept: PHYSICAL THERAPY | Age: 72
Discharge: HOME | End: 2022-09-27
Payer: MEDICARE

## 2022-09-27 PROCEDURE — 97110 THERAPEUTIC EXERCISES: CPT

## 2022-09-27 PROCEDURE — 97112 NEUROMUSCULAR REEDUCATION: CPT

## 2022-09-27 NOTE — PROGRESS NOTES
Physical Therapy LSVT BIG DAILY NOTE    Patient Name: Yoana Chu  Date:2022  : 1950  [x]  Patient  Verified  Payor: Narciso Cardozo / Plan: [place] MEDICARE PART A & B / Product Type: Medicare /    In time:10:45 a  Out time: 11:45 p  Total Treatment Time (min): 60  Total Timed Codes (min): 60  1:1 Treatment Time ( [address] only): 60  Visit #:  11    Treatment Area: Parkinson's disease [G20]    SUBJECTIVE  Pain Level (0-10 scale): 0  Any medication changes, allergies to medications, adverse drug reactions, diagnosis change, or new procedure performed?: [x] No    [x] Yes (see summary sheet for update)  Subjective functional status/changes:   [] No changes reported  Patient reports that he was stiff this morning when going up the stairs. OBJECTIVE      30 min Therapeutic Exercise:  [x] See flow sheet :   Rationale: increase ROM and increase strength to improve the patients ability to sit, stand, lift, carry, reach, ambulate and complete ADL's    30 min Neuromuscular Re-education:  [x]  See flow sheet :    Rationale: improve coordination, improve balance, and increase proprioception  to improve the patients ability to sit, stand, lift, carry, reach, ambulate and complete ADL's          With   [] TE   [] TA   [] neuro   [] other: Patient Education: [x] Review HEP    [] Progressed/Changed HEP based on:   [] positioning  [] body mechanics   [] transfers   [] heat/ice application    [] other:      Other Objective/Functional Measures:      All standing exercises performed on 2 mat layer with min increased balance difficulties, particularly on forward rock and reach      Writing performed with timing, repeating phrase able to reduce time significantly without reducing size of letters     Big walking with counting steps between a distance, able to maintain reduced distance with cognitive challenge     Step ups on 2 stack-able steps with mod-max difficulty for 5 reps       Daily Exercises/Functional Components: 40 minutes    Big Walking/Hierarchy Tasks: 20 minutes    Carryover Assignment: big walking and good turns cleaning up yard    Pain Level (0-10 scale) post treatment: 0    Level of Perceived Exertion post treatment: 7-8    ASSESSMENT/Changes in Function:     Patient will benefit from skilled PT services to modify and progress therapeutic interventions, address functional mobility deficits, address ROM deficits, address strength deficits, analyze and cue movement patterns, analyze and modify body mechanics/ergonomics, assess and modify postural abnormalities, address imbalance/dizziness, and instruct in home and community integration to address rehab goals per plan of care    Progress towards goals / Updated goals:  Short Term Goals: To be accomplished in 16 treatments:  1. Patient will report 0 LOB in 1 week to increase safety with household mobility. Progressing toward   2. Patient will consistently be able to perform sit to stand transfer on first attempt without UE assist to rising from sofa. MET  3. Patient will be able to write a full sentence with consistent lettering size to ease writting tasks and increase legibility.  Progressing toward     PLAN  [x]  Upgrade activities as tolerated     [x]  Continue plan of care  [x]  Update interventions per flow sheet       []  Discharge due to:_  []  Other:_      Delvin Barnhart DPT 9/27/2022

## 2022-09-27 NOTE — PROGRESS NOTES
Physical Therapy at [facility],   a part of  [facility]  [place]  [facility], [address]  Phone: [phone]      Fax:  [phone]    Progress Note    Name: Isaiah John   : 1950   MD: Anthony Ruiz MD       Treatment Diagnosis: Parkinson's disease [G20]  Start of Care: 22    Visits from Start of Care: 11  Missed Visits: 0    Summary of Care:gait training, therapeutic exercise, neuromuscular re-education     Assessment / Recommendations: At time of reassessment, patient is half way through the LSVT BIG protocol and he demonstrates excellent progress toward goals. He reports improvements in functional tasks including bed mobility, car transfers, sit to stand transfers from low soft surfaces, and ambulation on uneven terrain. He demonstrates improvement in maintaining larger script with handwriting but continues to struggle with dual tasking with handwriting and ambulation tasks. He will benefit from continued skilled services 4x/week for 2 weeks to complete the LSVT BIG protocol and achieve outstanding goals. Short Term Goals: To be accomplished in 16 treatments:  1. Patient will report 0 LOB in 1 week to increase safety with household mobility. Progressing toward   2. Patient will consistently be able to perform sit to stand transfer on first attempt without UE assist to rising from sofa. MET  3. Patient will be able to write a full sentence with consistent lettering size to ease writting tasks and increase legibility.  Progressing toward         Cyrus Everett DPT 2022

## 2022-09-28 ENCOUNTER — HOSPITAL ENCOUNTER (OUTPATIENT)
Dept: PHYSICAL THERAPY | Age: 72
Discharge: HOME | End: 2022-09-28
Payer: MEDICARE

## 2022-09-28 PROCEDURE — 97110 THERAPEUTIC EXERCISES: CPT

## 2022-09-28 PROCEDURE — 97112 NEUROMUSCULAR REEDUCATION: CPT

## 2022-09-28 NOTE — PROGRESS NOTES
Physical Therapy LSVT BIG DAILY NOTE    Patient Name: Bertha Carpenter  Date:2022  : 1950  [x]  Patient  Verified  Payor: Misbah Landrum / Plan: [place] MEDICARE PART A & B / Product Type: Medicare /    In time:10:50a  Out time: 11:50a  Total Treatment Time (min): 60  Total Timed Codes (min): 60  1:1 Treatment Time ([facility] only): 60  Visit #:  12    Treatment Area: Parkinson's disease [G20]    SUBJECTIVE  Pain Level (0-10 scale): 0  Any medication changes, allergies to medications, adverse drug reactions, diagnosis change, or new procedure performed?: [x] No    [x] Yes (see summary sheet for update)  Subjective functional status/changes:   [] No changes reported  Patient reports waking up in some \"arthritic pain\" this morning as he forgot to take his medication last night, but is feeling better after taking it this morning. OBJECTIVE      30 min Therapeutic Exercise:  [x] See flow sheet :   Rationale: increase ROM and increase strength to improve the patients ability to sit, stand, lift, carry, reach, ambulate and complete ADL's    30 min Neuromuscular Re-education:  [x]  See flow sheet :    Rationale: improve coordination, improve balance, and increase proprioception  to improve the patients ability to sit, stand, lift, carry, reach, ambulate and complete ADL's          With   [] TE   [] TA   [] neuro   [] other: Patient Education: [x] Review HEP    [] Progressed/Changed HEP based on:   [] positioning  [] body mechanics   [] transfers   [] heat/ice application    [] other:      Other Objective/Functional Measures:      All standing exercises performed on 2 mat layer with min increased balance difficulties, particularly on forward rock and reach    Big rolling to improve pt ability to get out of bed      Big walking with counting steps between a distance, able to maintain reduced distance with cognitive challenge     Step ups on 2 stack-able steps with mod-max difficulty for 5 reps       Daily Exercises/Functional Components: 40 minutes    Big Walking/Hierarchy Tasks: 20 minutes    Carryover Assignment: big roll to get out of bed in the morning    Pain Level (0-10 scale) post treatment: 0    Level of Perceived Exertion post treatment: 7-8    ASSESSMENT/Changes in Function:     Patient will benefit from skilled PT services to modify and progress therapeutic interventions, address functional mobility deficits, address ROM deficits, address strength deficits, analyze and cue movement patterns, analyze and modify body mechanics/ergonomics, assess and modify postural abnormalities, address imbalance/dizziness, and instruct in home and community integration to address rehab goals per plan of care    Progress towards goals / Updated goals:  Short Term Goals: To be accomplished in 16 treatments:  1. Patient will report 0 LOB in 1 week to increase safety with household mobility. Progressing toward   2. Patient will consistently be able to perform sit to stand transfer on first attempt without UE assist to rising from sofa. MET  3. Patient will be able to write a full sentence with consistent lettering size to ease writting tasks and increase legibility.  Progressing toward     PLAN  [x]  Upgrade activities as tolerated     [x]  Continue plan of care  [x]  Update interventions per flow sheet       []  Discharge due to:_  []  Other:_      Renetta Tan, PTA 9/28/2022

## 2022-09-29 ENCOUNTER — APPOINTMENT (OUTPATIENT)
Dept: PHYSICAL THERAPY | Age: 72
End: 2022-09-29
Payer: MEDICARE

## 2022-10-03 ENCOUNTER — HOSPITAL ENCOUNTER (OUTPATIENT)
Dept: PHYSICAL THERAPY | Age: 72
Discharge: HOME | End: 2022-10-03
Payer: MEDICARE

## 2022-10-03 PROCEDURE — 97110 THERAPEUTIC EXERCISES: CPT

## 2022-10-03 PROCEDURE — 97112 NEUROMUSCULAR REEDUCATION: CPT

## 2022-10-03 NOTE — PROGRESS NOTES
Physical Therapy LSVT BIG DAILY NOTE    Patient Name: Francheska Nichols  TGIC:  : 1950  [x]  Patient  Verified  Payor: Pedro Zbigniewaxel / Plan: [place] MEDICARE PART A & B / Product Type: Medicare /    In time:10:50a  Out time: 11:50a  Total Treatment Time (min): 60  Total Timed Codes (min): 60  1:1 Treatment Time ( [address] only): 60  Visit #:  13    Treatment Area: Parkinson's disease [G20]    SUBJECTIVE  Pain Level (0-10 scale): 0  Any medication changes, allergies to medications, adverse drug reactions, diagnosis change, or new procedure performed?: [x] No    [x] Yes (see summary sheet for update)  Subjective functional status/changes:   [] No changes reported  Patient reports he has been busy, but things are going well. OBJECTIVE      30 min Therapeutic Exercise:  [x] See flow sheet :   Rationale: increase ROM and increase strength to improve the patients ability to sit, stand, lift, carry, reach, ambulate and complete ADL's    30 min Neuromuscular Re-education:  [x]  See flow sheet :    Rationale: improve coordination, improve balance, and increase proprioception  to improve the patients ability to sit, stand, lift, carry, reach, ambulate and complete ADL's          With   [] TE   [] TA   [] neuro   [] other: Patient Education: [x] Review HEP    [] Progressed/Changed HEP based on:   [] positioning  [] body mechanics   [] transfers   [] heat/ice application    [x] other: LSVT [place] program     Other Objective/Functional Measures:      All standing exercises performed on 3 mat layer with mod increased balance difficulties, particularly on forward rock and reach, backwards step; rotation performed on 2 mat layers       Big walking with LE and UE weights with increased muscle fatigue      Daily Exercises/Functional Components: 40 minutes    Big Walking/Hierarchy Tasks: 20 minutes    Carryover Assignment: big pull to open doors with shopping    Pain Level (0-10 scale) post treatment: 0    Level of Perceived Exertion post treatment: 7-8    ASSESSMENT/Changes in Function:     Patient will benefit from skilled PT services to modify and progress therapeutic interventions, address functional mobility deficits, address ROM deficits, address strength deficits, analyze and cue movement patterns, analyze and modify body mechanics/ergonomics, assess and modify postural abnormalities, address imbalance/dizziness, and instruct in home and community integration to address rehab goals per plan of care    Progress towards goals / Updated goals:  Short Term Goals: To be accomplished in 16 treatments:  1. Patient will report 0 LOB in 1 week to increase safety with household mobility. Progressing toward   2. Patient will consistently be able to perform sit to stand transfer on first attempt without UE assist to rising from sofa. MET  3. Patient will be able to write a full sentence with consistent lettering size to ease writting tasks and increase legibility.  Progressing toward     PLAN  [x]  Upgrade activities as tolerated     [x]  Continue plan of care  [x]  Update interventions per flow sheet       []  Discharge due to:_  []  Other:_      Yari Valenzuela, PTA 10/3/2022

## 2022-10-05 ENCOUNTER — HOSPITAL ENCOUNTER (OUTPATIENT)
Dept: PHYSICAL THERAPY | Age: 72
Discharge: HOME | End: 2022-10-05
Payer: MEDICARE

## 2022-10-05 PROCEDURE — 97110 THERAPEUTIC EXERCISES: CPT

## 2022-10-05 PROCEDURE — 97112 NEUROMUSCULAR REEDUCATION: CPT

## 2022-10-05 NOTE — PROGRESS NOTES
Physical Therapy LSVT BIG DAILY NOTE    Patient Name: Bertha Carpenter  Date:10/5/2022  : 1950  [x]  Patient  Verified  Payor: Misbah Landrum / Plan: [place] MED[place] & B / Product Type: Medicare /    In time:10:45a  Out time: 11:40a  Total Treatment Time (min): 55  Total Timed Codes (min): 55  1:1 Treatment Time ( [address] only): 54  Visit #:  14    Treatment Area: Parkinson's disease [G20]    SUBJECTIVE  Pain Level (0-10 scale): 0  Any medication changes, allergies to medications, adverse drug reactions, diagnosis change, or new procedure performed?: [x] No    [x] Yes (see summary sheet for update)  Subjective functional status/changes:   [] No changes reported  Patient reports his neck started to bother him last night and has a HA this morning. OBJECTIVE      25 min Therapeutic Exercise:  [x] See flow sheet :   Rationale: increase ROM and increase strength to improve the patients ability to sit, stand, lift, carry, reach, ambulate and complete ADL's    30 min Neuromuscular Re-education:  [x]  See flow sheet :    Rationale: improve coordination, improve balance, and increase proprioception  to improve the patients ability to sit, stand, lift, carry, reach, ambulate and complete ADL's          With   [] TE   [] TA   [] neuro   [] other: Patient Education: [x] Review HEP    [] Progressed/Changed HEP based on:   [] positioning  [] body mechanics   [] transfers   [] heat/ice application    [] other: gym program/equipment     Other Objective/Functional Measures:      All standing exercises performed on 3 mat layer with mod increased balance difficulties, particularly on forward rock and reach, backwards step; rotation performed on 2 mat layers       Big walking with LE and UE weights with increased muscle fatigue; performed with cognitive challenge and directional turns today with min verbal cues for big hands and arm swing, several minor LOB with increased fatigue      Daily Exercises/Functional Components: 35 minutes    Big Walking/Hierarchy Tasks: 20 minutes    Carryover Assignment: Big walk on gravel driveway, big stand from car    Pain Level (0-10 scale) post treatment: 0    Level of Perceived Exertion post treatment: 7-8    ASSESSMENT/Changes in Function:     Patient will benefit from skilled PT services to modify and progress therapeutic interventions, address functional mobility deficits, address ROM deficits, address strength deficits, analyze and cue movement patterns, analyze and modify body mechanics/ergonomics, assess and modify postural abnormalities, address imbalance/dizziness, and instruct in home and community integration to address rehab goals per plan of care    Progress towards goals / Updated goals:  Short Term Goals: To be accomplished in 16 treatments:  1. Patient will report 0 LOB in 1 week to increase safety with household mobility. Progressing toward   2. Patient will consistently be able to perform sit to stand transfer on first attempt without UE assist to rising from sofa. MET  3. Patient will be able to write a full sentence with consistent lettering size to ease writting tasks and increase legibility.  Progressing toward     PLAN  [x]  Upgrade activities as tolerated     [x]  Continue plan of care  [x]  Update interventions per flow sheet       []  Discharge due to:_  []  Other:_      Deanna Pepper, PTA 10/5/2022

## 2022-10-06 ENCOUNTER — HOSPITAL ENCOUNTER (OUTPATIENT)
Dept: PHYSICAL THERAPY | Age: 72
Discharge: HOME | End: 2022-10-06
Payer: MEDICARE

## 2022-10-06 PROCEDURE — 97112 NEUROMUSCULAR REEDUCATION: CPT

## 2022-10-06 PROCEDURE — 97110 THERAPEUTIC EXERCISES: CPT

## 2022-10-06 NOTE — PROGRESS NOTES
Physical Therapy LSVT BIG DAILY NOTE    Patient Name: Danny Mckinnon  Date:10/6/2022  : 1950  [x]  Patient  Verified  Payor: Kevan Allen / Plan: [place] MEDICARE PART A & B / Product Type: Medicare /    In time:11:50a  Out time: 12:45p  Total Treatment Time (min): 55  Total Timed Codes (min): 55  1:1 Treatment Time ( [address] only): 54  Visit #:  15    Treatment Area: Parkinson's disease [G20]    SUBJECTIVE  Pain Level (0-10 scale): 0  Any medication changes, allergies to medications, adverse drug reactions, diagnosis change, or new procedure performed?: [x] No    [x] Yes (see summary sheet for update)  Subjective functional status/changes:   [] No changes reported  Patient reports feeling much better today compared to yesterday. Patient states his neck pain and HA continued to improve after resting yesterday.     OBJECTIVE      25 min Therapeutic Exercise:  [x] See flow sheet :   Rationale: increase ROM and increase strength to improve the patients ability to sit, stand, lift, carry, reach, ambulate and complete ADL's    30 min Neuromuscular Re-education:  [x]  See flow sheet :    Rationale: improve coordination, improve balance, and increase proprioception  to improve the patients ability to sit, stand, lift, carry, reach, ambulate and complete ADL's          With   [] TE   [] TA   [] neuro   [] other: Patient Education: [x] Review HEP    [] Progressed/Changed HEP based on:   [] positioning  [] body mechanics   [] transfers   [] heat/ice application    [] other:      Other Objective/Functional Measures:   MMT:                                                  R  L     Hip flexion   4+  5  Knee extension  5  5  Knee flexion   5  5  Dorsiflexion   5  5  Plantarflexion   5  5    5X STS: 9 seconds    TUG 3 step:   TU seconds   Manual: 9 seconds   Cognitive: 8 seconds    All standing exercises performed on 3 mat layer with mod increased balance difficulties, particularly on forward rock and reach, backwards step; rotation performed on 2 mat layers       Big walking with LE and UE weights with increased muscle fatigue; performed with cognitive challenge and directional turns today with min verbal cues for big hands and arm swing, several minor LOB with increased fatigue      Daily Exercises/Functional Components: 35 minutes    Big Walking/Hierarchy Tasks: 20 minutes    Carryover Assignment: NA    Pain Level (0-10 scale) post treatment: 0    Level of Perceived Exertion post treatment: 7-8    ASSESSMENT/Changes in Function:       Progress towards goals / Updated goals: Patient has attended 15 visit therapy visits with focus on LSVT BIG program. Patient demonstrates integration of BIG concepts with ADL's and ambulation. He demonstrates decreased/low fall risk, improved strength and has met all goals. Patient has maximized therapeutic benefits at this time and is to be [facility] to [facility]. Short Term Goals: To be accomplished in 16 treatments:  1. Patient will report 0 LOB in 1 week to increase safety with household mobility. Met  2. Patient will consistently be able to perform sit to stand transfer on first attempt without UE assist to rising from sofa. Met  3. Patient will be able to write a full sentence with consistent lettering size to ease writting tasks and increase legibility.  Met    PLAN  []  Upgrade activities as tolerated     []  Continue plan of care  []  Update interventions per flow sheet       [x]  Discharge due to:_  []  Other:_      Monse Corrales, TOMAS 10/6/2022

## 2022-10-07 ENCOUNTER — APPOINTMENT (OUTPATIENT)
Dept: PHYSICAL THERAPY | Age: 72
End: 2022-10-07
Payer: MEDICARE

## 2022-10-17 NOTE — PROGRESS NOTES
Physical Therapy at [facility],   a part of  [facility]  [address] [facility] [place] [facility]  Phone: [phone] Fax: [phone]      Discharge Summary 2-15    Patient name: Hailey Lopez  : 1950  Provider#: [id number]  Referral source: Edgardo Mayorga MD      Medical/Treatment Diagnosis: Parkinson's disease [G20]     Prior Hospitalization: see medical history     Comorbidities: See Plan of Care  Prior Level of Function: See Plan of Care  Medications: Verified on Patient Summary List    Start of Care: 22      Onset Date:   Visits from Start of Care: 15     Missed Visits: 2  Reporting Period : 22 to 10/6/22    Assessment/Summary of care:     Patient has attended 15 visits and has completed the LSVT BIG protocol. Patient demonstrates integration of BIG concepts with ADL's and ambulation. He demonstrates decreased/low fall risk, improved strength and has met all goals. Patient has maximized therapeutic benefits at this time and is to be discharged to [facility]. Short Term Goals: To be accomplished in 16 treatments:  1. Patient will report 0 LOB in 1 week to increase safety with household mobility. Met  2. Patient will consistently be able to perform sit to stand transfer on first attempt without UE assist to rising from sofa. Met  3. Patient will be able to write a full sentence with consistent lettering size to ease writting tasks and increase legibility.  Met        RECOMMENDATIONS:  [x]Discontinue therapy: [x]Patient has reached or is progressing toward set goals     []Patient is non-compliant or has abdicated     []Due to lack of appreciable progress towards set goals     []Other  Bakari Bentley DPT 10/17/2022

## 2022-10-18 RX ORDER — CHLORTHALIDONE 25 MG/1
TABLET ORAL
Qty: 90 TABLET | Refills: 1 | Status: SHIPPED | OUTPATIENT
Start: 2022-10-18

## 2022-10-18 NOTE — TELEPHONE ENCOUNTER
Refill per VO of Dr. Lexie Polanco:    Last appt: 8/17/2022    Future Appointments   Date Time Provider Darron Vanegas   1/30/2023  1:00 PM Annabel Hashimoto,  [facility] BS AMB   2/2/2023 11:20 AM Carlo Cuello MD [facility] BS AMB       Requested Prescriptions     Pending Prescriptions Disp Refills    chlorthalidone (HYGROTON) 25 mg tablet [Pharmacy Med Name: CHLORTHALIDONE 25 MG TABLET] 30 Tablet 5     Sig: TAKE ONE TABLET BY MOUTH DAILY

## 2023-02-17 ENCOUNTER — OFFICE VISIT (OUTPATIENT)
Dept: CARDIOLOGY CLINIC | Age: 73
End: 2023-02-17
Payer: MEDICARE

## 2023-02-17 VITALS
HEART RATE: 66 BPM | DIASTOLIC BLOOD PRESSURE: 80 MMHG | BODY MASS INDEX: 34.13 KG/M2 | SYSTOLIC BLOOD PRESSURE: 154 MMHG | HEIGHT: 69 IN | OXYGEN SATURATION: 95 % | WEIGHT: 230.4 LBS

## 2023-02-17 DIAGNOSIS — I48.0 PAF (PAROXYSMAL ATRIAL FIBRILLATION) (HCC): Primary | ICD-10-CM

## 2023-02-17 DIAGNOSIS — I10 ESSENTIAL HYPERTENSION: ICD-10-CM

## 2023-02-17 DIAGNOSIS — E78.2 MIXED HYPERLIPIDEMIA: ICD-10-CM

## 2023-02-17 DIAGNOSIS — I25.10 CORONARY ARTERY DISEASE INVOLVING NATIVE CORONARY ARTERY OF NATIVE HEART WITHOUT ANGINA PECTORIS: ICD-10-CM

## 2023-02-17 NOTE — PROGRESS NOTES
Chief Complaint   Patient presents with    Follow-up     6 mo   PAF    Cholesterol Problem    Hypertension     Vitals:    02/17/23 0954 02/17/23 0958   BP: (!) 162/82 (!) 154/80   BP 1 Location: Left upper arm Right upper arm   BP Patient Position: Sitting Sitting   Pulse: 66    Height: 5' 9\" (1.753 m)    Weight: 230 lb 6.4 oz (104.5 kg)    SpO2: 95%        Chest pain denied     SOB denied     Palpitations denied     Swelling in hands/feet denied     Dizziness denied     Recent hospital stays denied     Refills denied

## 2023-02-17 NOTE — PROGRESS NOTES
Office Follow-up    NAME: Saeid Cason   :  1950  MRM:  [id number]    Date:  2023            Assessment and Plan:     Atrial fibrillation (DCCV 2021, prior DCCV ): He is now in sinus rhythm with sinus bradycardia. Amio was stopped 2022. Continue metoprolol  mg p.o. daily. Is not on chronic anticoagulation as he cannot afford Eliquis and in the past we had offered him Coumadin but he is not interested. He is currently on aspirin 81 mg p.o. daily which he will continue. CAD: Restented RCA in : This is stable. Continue current medications. There is residual LAD 60% lesion is stable without any ischemia on stress test last noted in . He is not thrilled about taking statins. He will get FLP with Dr. Nadia Brown. Hypertension: Blood pressure is elevated today. Has not taken meds today. Continue Lisinopril, HCTZ 25mg po daily and Toprol-XL. Dyslipidemia: Unable to take statins because of myalgias. Dietary control of cholesterol. FLP is follow-up primary care physician. Sleep apnea: Uses dental device. Slow mobility/ Slow speech: Parkinsons is ruled out at [address] [place] [address]. See Dr. Saroj Lindsay in 6 months. He got   in 2019 on May 16th. He is a nurse and retired in . ATTENTION:   This medical record was transcribed using an electronic medical records/speech recognition system. Although proofread, it may and can contain electronic, spelling and other errors. Corrections may be executed at a later time. Please feel free to contact us for any clarifications as needed. Subjective:     Saeid Cason, a 70y.o. year-old who presents for followup. He recently underwent A. fib cardioversion on 2021. This was his second cardioversion. He converted to sinus rhythm. Has been feeling fatigued and also have some balance issues. No chest pain or SOB.        Exam:     Physical Exam:  Visit Vitals  BP (!) 154/80 (BP 1 Location: Right upper arm, BP Patient Position: Sitting)   Pulse 66   Ht 5' 9\" (1.753 m)   Wt 230 lb 6.4 oz (104.5 kg)   SpO2 95%   BMI 34.02 kg/m²     General appearance - alert, well appearing, and in no distress  Mental status - affect appropriate to mood  Eyes - sclera anicteric, moist mucous membranes  Neck - supple, no significant adenopathy  Chest - clear to auscultation, no wheezes, rales or rhonchi  Heart - normal rate, regular rhythm, normal S1, S2, no murmurs, rubs, clicks or gallops  Abdomen - soft, nontender, nondistended, no masses or organomegaly  Extremities - peripheral pulses normal, no pedal edema  Skin - normal coloration  no rashes    Medications:     Current Outpatient Medications   Medication Sig    chlorthalidone (HYGROTON) 25 mg tablet TAKE ONE TABLET BY MOUTH DAILY    metoprolol succinate (TOPROL-XL) 100 mg tablet TAKE ONE TABLET BY MOUTH DAILY    lisinopriL (PRINIVIL, ZESTRIL) 10 mg tablet TAKE ONE TABLET BY MOUTH DAILY    aspirin delayed-release 81 mg tablet Take 81 mg by mouth two (2) times a day. ergocalciferol (ERGOCALCIFEROL) 50,000 unit capsule TAKE 1 CAPSULE BY MOUTH 3 TIMES A WEEK (Patient taking differently: Take 50,000 Units by mouth. Twice a week)    SILDENAFIL CITRATE (VIAGRA PO) Take 20 mg by mouth as needed. naproxen (NAPROSYN) 250 mg tablet Take 250 mg by mouth as needed. multivitamin (ONE A DAY) tablet Take 1 Tab by mouth daily. No current facility-administered medications for this visit. Diagnostic Data Review:       4/3/17 Spect lexiscan: LVEF 55%; small area of mod intensity basal to mid inferior wall infarct with mild vesna-infarct ischemia. Stress test cardiolite:5/4/11- basal and mid inferior ischemia, EF 56%    Cardiac catheterization:9/29/2011- LVEDP 23, LVG EF 50%, moderate inferior HK, LM normal, LAD calcified prox 60%, circumflex prox stent patent, OM 1 prox stent patent, RCA long prox/mid stent with 99% in-stent restenosis, T2 flow.  Distal short stent probably patent but slow filling hampers assessment   Stent insertion:9/29/11- PCI of RCA with 3.0 x 18 mm Xience (mid) and 2.75 x 15 Xience (distal)      Lab Review:     Lab Results   Component Value Date/Time    Cholesterol, total 163 09/30/2011 06:10 AM    Cholesterol, Total 159 05/22/2017 12:00 AM    HDL Cholesterol 43 09/30/2011 06:10 AM    LDL,Direct 45 10/05/2010 08:23 AM    LDL, calculated 84.2 09/30/2011 06:10 AM    Triglyceride 179 (H) 09/30/2011 06:10 AM    CHOL/HDL Ratio 3.8 09/30/2011 06:10 AM     Lab Results   Component Value Date/Time    Creatinine 0.94 04/01/2021 10:45 AM     Lab Results   Component Value Date/Time    BUN 18 04/01/2021 10:45 AM     Lab Results   Component Value Date/Time    Potassium 4.3 04/01/2021 10:45 AM     Lab Results   Component Value Date/Time    Hemoglobin A1c 6.3 (H) 10/16/2010 06:37 AM    Hemoglobin A1c, External 6.2 02/21/2015 12:00 AM     Lab Results   Component Value Date/Time    HGB 14.5 11/21/2013 05:30 PM     Lab Results   Component Value Date/Time    PLATELET 936 02/21/7576 05:30 PM     No results for input(s): CPK, CKMB, TROIQ in the last 72 hours. No lab exists for component: CKQMB, CPKMB  -           ___________________________________________________    Lucía Mccoy.  Juan Emanuel MD, [facility]

## 2023-04-03 RX ORDER — LISINOPRIL 10 MG/1
TABLET ORAL
Qty: 90 TABLET | Refills: 3 | Status: SHIPPED | OUTPATIENT
Start: 2023-04-03

## 2023-04-03 RX ORDER — METOPROLOL SUCCINATE 100 MG/1
TABLET, EXTENDED RELEASE ORAL
Qty: 90 TABLET | Refills: 3 | Status: SHIPPED | OUTPATIENT
Start: 2023-04-03

## 2023-04-03 NOTE — TELEPHONE ENCOUNTER
Refill per VO of Dr. Zuly Dominguez:    Last appt: 2/17/2023    Future Appointments   Date Time Provider Darron Coronadoi   8/24/2023 10:00 AM Chino Cuello MD [facility] BS AMB       Requested Prescriptions     Pending Prescriptions Disp Refills    metoprolol succinate (TOPROL-XL) 100 mg tablet [Pharmacy Med Name: METOPROLOL SUCC  MG TAB] 90 Tablet 1     Sig: TAKE ONE TABLET BY MOUTH DAILY    lisinopriL (PRINIVIL, ZESTRIL) 10 mg tablet [Pharmacy Med Name: LISINOPRIL 10 MG TABLET] 90 Tablet 2     Sig: TAKE ONE TABLET BY MOUTH DAILY

## 2023-04-20 RX ORDER — CHLORTHALIDONE 25 MG/1
TABLET ORAL
Qty: 90 TABLET | Refills: 3 | Status: SHIPPED | OUTPATIENT
Start: 2023-04-20

## 2023-04-20 NOTE — TELEPHONE ENCOUNTER
Refill per VO of Dr. Celina Pinto:    Last appt: 2/17/2023    Future Appointments   Date Time Provider Darron Vanegas   8/24/2023 10:00 AM Emperatriz Cuello MD [facility] BS AMB       Requested Prescriptions     Pending Prescriptions Disp Refills    chlorthalidone (HYGROTON) 25 mg tablet [Pharmacy Med Name: CHLORTHALIDONE 25 MG TABLET] 90 Tablet 1     Sig: TAKE ONE TABLET BY MOUTH DAILY

## 2023-06-02 NOTE — PROGRESS NOTES
Physical Therapy LSVT BIG DAILY NOTE    Patient Name: Danae Cohen  Date:2022  : 1950  [x]  Patient  Verified  Payor: Yajaira Hazard / Plan: [place] MEDICARE PART A & B / Product Type: Medicare /    In time:10:45a  Out time:11:45a  Total Treatment Time (min): 60  Total Timed Codes (min): 60  1:1 Treatment Time ( [address] only): 60   Visit #:  2    Treatment Area: Parkinson's disease [G20]    SUBJECTIVE  Pain Level (0-10 scale): 0  Any medication changes, allergies to medications, adverse drug reactions, diagnosis change, or new procedure performed?: [x] No    [x] Yes (see summary sheet for update)  Subjective functional status/changes:   [] No changes reported  Patient reports some stiffness due to arthritis this morning, but took some medications to help. Patient states he was not on Sinemet that this \"was an error in syntax\" from him.       OBJECTIVE      30 min Therapeutic Exercise:  [x] See flow sheet :   Rationale: increase ROM and increase strength to improve the patients ability to sit, stand, lift, carry, reach, ambulate and complete ADL's    30 min Neuromuscular Re-education:  [x]  See flow sheet :    Rationale: improve coordination, improve balance, and increase proprioception  to improve the patients ability to sit, stand, lift, carry, reach, ambulate and complete ADL's          With   [] TE   [] TA   [] neuro   [] other: Patient Education: [x] Review HEP    [] Progressed/Changed HEP based on:   [] positioning  [] body mechanics   [] transfers   [] heat/ice application    [] other:      Other Objective/Functional Measures:     Pt required verbal cues for big hands throughout most exercises today,     Verbal cues for full step back to starting position for forward stepping    Lateral sway noted with several standing exercises performed outside parallel bars    Standing rotation and reach exercise performed in parallel bars today       Daily Exercises/Functional Components: 40 minutes    Big Walking/Hierarchy Tasks: 20 minutes    Carryover Assignment: Functional recording form    Pain Level (0-10 scale) post treatment: 0    Level of Perceived Exertion post treatment: 7-8    ASSESSMENT/Changes in Function: Patient demonstrates right LE circumduction with ambulation, improved with cues for big stomping, ambulation speed and trunk rotation improved with cues for driving the elbows back. Good overall performance of all interventions with 1-2 minor LOB with standing exercises with no assistance required to recover. Patient will benefit from skilled PT services to modify and progress therapeutic interventions, address functional mobility deficits, address ROM deficits, address strength deficits, analyze and cue movement patterns, analyze and modify body mechanics/ergonomics, assess and modify postural abnormalities, address imbalance/dizziness, and instruct in home and community integration to address rehab goals per plan of care    Progress towards goals / Updated goals:  Patient initiated LSVT program.    PLAN  []  Upgrade activities as tolerated     [x]  Continue plan of care  []  Update interventions per flow sheet       []  Discharge due to:_  []  Other:_      Cholo Parker, TOMAS, LSVT BIG Cert.  9/12/2022 Unable to assess

## 2023-07-27 ENCOUNTER — TELEPHONE (OUTPATIENT)
Age: 73
End: 2023-07-27

## 2023-09-15 ENCOUNTER — OFFICE VISIT (OUTPATIENT)
Age: 73
End: 2023-09-15
Payer: MEDICARE

## 2023-09-15 VITALS
WEIGHT: 234 LBS | OXYGEN SATURATION: 93 % | HEART RATE: 62 BPM | BODY MASS INDEX: 34.66 KG/M2 | HEIGHT: 69 IN | DIASTOLIC BLOOD PRESSURE: 80 MMHG | SYSTOLIC BLOOD PRESSURE: 140 MMHG

## 2023-09-15 DIAGNOSIS — E78.2 MIXED HYPERLIPIDEMIA: ICD-10-CM

## 2023-09-15 DIAGNOSIS — I48.0 PAROXYSMAL ATRIAL FIBRILLATION (HCC): ICD-10-CM

## 2023-09-15 DIAGNOSIS — I10 HYPERTENSION, UNSPECIFIED TYPE: Primary | ICD-10-CM

## 2023-09-15 PROCEDURE — 3079F DIAST BP 80-89 MM HG: CPT | Performed by: INTERNAL MEDICINE

## 2023-09-15 PROCEDURE — 99214 OFFICE O/P EST MOD 30 MIN: CPT | Performed by: INTERNAL MEDICINE

## 2023-09-15 PROCEDURE — 93005 ELECTROCARDIOGRAM TRACING: CPT | Performed by: INTERNAL MEDICINE

## 2023-09-15 PROCEDURE — 93010 ELECTROCARDIOGRAM REPORT: CPT | Performed by: INTERNAL MEDICINE

## 2023-09-15 PROCEDURE — 1123F ACP DISCUSS/DSCN MKR DOCD: CPT | Performed by: INTERNAL MEDICINE

## 2023-09-15 PROCEDURE — G8417 CALC BMI ABV UP PARAM F/U: HCPCS | Performed by: INTERNAL MEDICINE

## 2023-09-15 PROCEDURE — 3077F SYST BP >= 140 MM HG: CPT | Performed by: INTERNAL MEDICINE

## 2023-09-15 PROCEDURE — G8428 CUR MEDS NOT DOCUMENT: HCPCS | Performed by: INTERNAL MEDICINE

## 2023-09-15 PROCEDURE — 3017F COLORECTAL CA SCREEN DOC REV: CPT | Performed by: INTERNAL MEDICINE

## 2023-09-15 PROCEDURE — 1036F TOBACCO NON-USER: CPT | Performed by: INTERNAL MEDICINE

## 2023-09-15 RX ORDER — GABAPENTIN 300 MG/1
CAPSULE ORAL
COMMUNITY
Start: 2023-08-27

## 2023-09-15 RX ORDER — TADALAFIL 20 MG/1
TABLET ORAL
COMMUNITY

## 2024-01-04 ENCOUNTER — TELEPHONE (OUTPATIENT)
Age: 74
End: 2024-01-04

## 2024-01-05 ENCOUNTER — OFFICE VISIT (OUTPATIENT)
Age: 74
End: 2024-01-05
Payer: MEDICARE

## 2024-01-05 ENCOUNTER — TELEPHONE (OUTPATIENT)
Age: 74
End: 2024-01-05

## 2024-01-05 VITALS
HEIGHT: 69 IN | HEART RATE: 51 BPM | OXYGEN SATURATION: 97 % | DIASTOLIC BLOOD PRESSURE: 100 MMHG | SYSTOLIC BLOOD PRESSURE: 160 MMHG | WEIGHT: 225 LBS | BODY MASS INDEX: 33.33 KG/M2

## 2024-01-05 DIAGNOSIS — I10 HYPERTENSION, UNSPECIFIED TYPE: ICD-10-CM

## 2024-01-05 DIAGNOSIS — I48.0 PAROXYSMAL ATRIAL FIBRILLATION (HCC): Primary | ICD-10-CM

## 2024-01-05 DIAGNOSIS — I25.83 CORONARY ARTERY DISEASE DUE TO LIPID RICH PLAQUE: ICD-10-CM

## 2024-01-05 DIAGNOSIS — I25.10 CORONARY ARTERY DISEASE DUE TO LIPID RICH PLAQUE: ICD-10-CM

## 2024-01-05 DIAGNOSIS — I48.0 PAROXYSMAL ATRIAL FIBRILLATION (HCC): ICD-10-CM

## 2024-01-05 LAB
ANION GAP SERPL CALC-SCNC: 7 MMOL/L (ref 5–15)
BUN SERPL-MCNC: 22 MG/DL (ref 6–20)
BUN/CREAT SERPL: 19 (ref 12–20)
CALCIUM SERPL-MCNC: 9.4 MG/DL (ref 8.5–10.1)
CHLORIDE SERPL-SCNC: 109 MMOL/L (ref 97–108)
CO2 SERPL-SCNC: 26 MMOL/L (ref 21–32)
CREAT SERPL-MCNC: 1.17 MG/DL (ref 0.7–1.3)
GLUCOSE SERPL-MCNC: 117 MG/DL (ref 65–100)
MAGNESIUM SERPL-MCNC: 1.9 MG/DL (ref 1.6–2.4)
POTASSIUM SERPL-SCNC: 4.4 MMOL/L (ref 3.5–5.1)
SODIUM SERPL-SCNC: 142 MMOL/L (ref 136–145)

## 2024-01-05 PROCEDURE — 1123F ACP DISCUSS/DSCN MKR DOCD: CPT | Performed by: INTERNAL MEDICINE

## 2024-01-05 PROCEDURE — 99214 OFFICE O/P EST MOD 30 MIN: CPT | Performed by: INTERNAL MEDICINE

## 2024-01-05 PROCEDURE — 3017F COLORECTAL CA SCREEN DOC REV: CPT | Performed by: INTERNAL MEDICINE

## 2024-01-05 PROCEDURE — 1036F TOBACCO NON-USER: CPT | Performed by: INTERNAL MEDICINE

## 2024-01-05 PROCEDURE — G8428 CUR MEDS NOT DOCUMENT: HCPCS | Performed by: INTERNAL MEDICINE

## 2024-01-05 PROCEDURE — 3077F SYST BP >= 140 MM HG: CPT | Performed by: INTERNAL MEDICINE

## 2024-01-05 PROCEDURE — G8417 CALC BMI ABV UP PARAM F/U: HCPCS | Performed by: INTERNAL MEDICINE

## 2024-01-05 PROCEDURE — 3080F DIAST BP >= 90 MM HG: CPT | Performed by: INTERNAL MEDICINE

## 2024-01-05 PROCEDURE — G8484 FLU IMMUNIZE NO ADMIN: HCPCS | Performed by: INTERNAL MEDICINE

## 2024-01-05 RX ORDER — AMIODARONE HYDROCHLORIDE 200 MG/1
200 TABLET ORAL 2 TIMES DAILY
Qty: 60 TABLET | Refills: 3 | Status: SHIPPED | OUTPATIENT
Start: 2024-01-05

## 2024-01-05 RX ORDER — ACETAMINOPHEN 325 MG/1
650 TABLET ORAL EVERY 6 HOURS PRN
COMMUNITY

## 2024-01-05 RX ORDER — AMIODARONE HYDROCHLORIDE 200 MG/1
200 TABLET ORAL 2 TIMES DAILY
COMMUNITY
End: 2024-01-05 | Stop reason: SDUPTHER

## 2024-01-05 RX ORDER — CYCLOBENZAPRINE HCL 5 MG
5 TABLET ORAL 2 TIMES DAILY PRN
COMMUNITY

## 2024-01-05 NOTE — PATIENT INSTRUCTIONS
Start Amiodarone 200mg po twice daily.     DUARTE and Cardioversion on Jan 10th.    Eliquis 5mg po BID.

## 2024-01-05 NOTE — H&P (VIEW-ONLY)
Office Follow-up    NAME: Herbie Kamara   :  1950  MRM:  [id number]    Date:  2023            Assessment and Plan:     Atrial fibrillation (DCCV 2021, prior DCCV ): EKG from 2024 show Afib HR 80. Will proceed with DCCV and Amio again. He has lower back surgery on  that will need to be delayed has he will need to be on Eliquis for 1 month. He will stop ASA at that time.   CAD: Restented RCA in : This is stable.  Continue current medications.  There is residual LAD 60% lesion is stable without any ischemia on stress test last noted in . He is not thrilled about taking statins. He will get FLP with Dr. Kang.   Hypertension: Blood pressure is elevated today. Continue Lisinopril  and Toprol-XL.  Dyslipidemia: Unable to take statins because of myalgias.  Dietary control of cholesterol.  FLP is follow-up primary care physician.   Sleep apnea: Uses dental device.   Slow mobility/ Slow speech: Parkinsons is ruled out at [facility].          He got   in 2019 on May 16th.  He is a nurse and retired in .                     ATTENTION:   This medical record was transcribed using an electronic medical records/speech recognition system.  Although proofread, it may and can contain electronic, spelling and other errors.  Corrections may be executed at a later time.  Please feel free to contact us for any clarifications as needed.         Subjective:     Herbie Kamara, a 71 y.o. year-old who presents for followup.  He recently underwent A. fib cardioversion on 2021.  This was his second cardioversion.  He converted to sinus rhythm. Has been feeling fatigued and also have some balance issues. No chest pain or SOB.       Exam:     Physical Exam:  Visit Vitals  BP (!) 160/100   Pulse 51   Ht 1.753 m (5' 9\")   Wt 102.1 kg (225 lb)   SpO2 97%   BMI 33.23 kg/m²       General appearance - alert, well appearing, and in no distress  Mental status - affect appropriate

## 2024-01-05 NOTE — TELEPHONE ENCOUNTER
Spoke to Pt of DUARTE/DCCV Scheduled for 1/10/24 at 11:30am arrive at 11:30am  Check in at the 1st floor outpt Reg. Desk   You will need a   NPO from midnight prior to procedure  Have  Labs done on 1/5/24  Medications:  Hold Metformin day of procedure  Hold Farxgia Day of procedure  VO by /nurse Hortencia VELASQUEZ

## 2024-01-05 NOTE — PROGRESS NOTES
Chief Complaint   Patient presents with    Abnormal Test Results     Vitals:    01/05/24 1445 01/05/24 1454   BP: (!) 166/100 (!) 160/100   Site: Left Upper Arm    Position: Sitting    Cuff Size: Medium Adult    Pulse: 51    SpO2: 97%    Weight: 102.1 kg (225 lb)    Height: 1.753 m (5' 9\")       BP (!) 160/100   Pulse 51   Ht 1.753 m (5' 9\")   Wt 102.1 kg (225 lb)   SpO2 97%   BMI 33.23 kg/m²

## 2024-01-05 NOTE — PROGRESS NOTES
Office Follow-up    NAME: Herbie Kamara   :  1950  MRM:  [id number]    Date:  2023            Assessment and Plan:     Atrial fibrillation (DCCV 2021, prior DCCV ): EKG from 2024 show Afib HR 80. Will proceed with DCCV and Amio again. He has lower back surgery on  that will need to be delayed has he will need to be on Eliquis for 1 month. He will stop ASA at that time.   CAD: Restented RCA in : This is stable.  Continue current medications.  There is residual LAD 60% lesion is stable without any ischemia on stress test last noted in . He is not thrilled about taking statins. He will get FLP with Dr. Kang.   Hypertension: Blood pressure is elevated today. Continue Lisinopril  and Toprol-XL.  Dyslipidemia: Unable to take statins because of myalgias.  Dietary control of cholesterol.  FLP is follow-up primary care physician.   Sleep apnea: Uses dental device.   Slow mobility/ Slow speech: Parkinsons is ruled out at [facility].          He got   in 2019 on May 16th.  He is a nurse and retired in .                     ATTENTION:   This medical record was transcribed using an electronic medical records/speech recognition system.  Although proofread, it may and can contain electronic, spelling and other errors.  Corrections may be executed at a later time.  Please feel free to contact us for any clarifications as needed.         Subjective:     Herbie Kamara, a 71 y.o. year-old who presents for followup.  He recently underwent A. fib cardioversion on 2021.  This was his second cardioversion.  He converted to sinus rhythm. Has been feeling fatigued and also have some balance issues. No chest pain or SOB.       Exam:     Physical Exam:  Visit Vitals  BP (!) 160/100   Pulse 51   Ht 1.753 m (5' 9\")   Wt 102.1 kg (225 lb)   SpO2 97%   BMI 33.23 kg/m²       General appearance - alert, well appearing, and in no distress  Mental status - affect appropriate

## 2024-01-08 ENCOUNTER — DIRECT ADMIT ORDERS (OUTPATIENT)
Age: 74
End: 2024-01-08

## 2024-01-08 DIAGNOSIS — I48.0 PAROXYSMAL ATRIAL FIBRILLATION (HCC): Primary | ICD-10-CM

## 2024-01-08 RX ORDER — SODIUM CHLORIDE 0.9 % (FLUSH) 0.9 %
5-40 SYRINGE (ML) INJECTION PRN
OUTPATIENT
Start: 2024-01-10

## 2024-01-08 RX ORDER — SODIUM CHLORIDE 0.9 % (FLUSH) 0.9 %
5-40 SYRINGE (ML) INJECTION EVERY 12 HOURS SCHEDULED
OUTPATIENT
Start: 2024-01-10

## 2024-01-08 RX ORDER — SODIUM CHLORIDE 9 MG/ML
INJECTION, SOLUTION INTRAVENOUS PRN
OUTPATIENT
Start: 2024-01-10

## 2024-01-10 ENCOUNTER — HOSPITAL ENCOUNTER (OUTPATIENT)
Facility: HOSPITAL | Age: 74
Discharge: HOME | End: 2024-01-10
Attending: INTERNAL MEDICINE | Admitting: INTERNAL MEDICINE
Payer: MEDICARE

## 2024-01-10 VITALS
HEART RATE: 60 BPM | HEIGHT: 69 IN | BODY MASS INDEX: 33.33 KG/M2 | OXYGEN SATURATION: 98 % | SYSTOLIC BLOOD PRESSURE: 120 MMHG | RESPIRATION RATE: 18 BRPM | WEIGHT: 225 LBS | DIASTOLIC BLOOD PRESSURE: 63 MMHG

## 2024-01-10 DIAGNOSIS — I48.91 A-FIB (HCC): ICD-10-CM

## 2024-01-10 LAB — ECHO BSA: 2.23 M2

## 2024-01-10 PROCEDURE — 6370000000 HC RX 637 (ALT 250 FOR IP): Performed by: INTERNAL MEDICINE

## 2024-01-10 PROCEDURE — 6360000002 HC RX W HCPCS: Performed by: INTERNAL MEDICINE

## 2024-01-10 PROCEDURE — 92960 CARDIOVERSION ELECTRIC EXT: CPT

## 2024-01-10 PROCEDURE — 99152 MOD SED SAME PHYS/QHP 5/>YRS: CPT

## 2024-01-10 RX ORDER — LIDOCAINE 50 MG/G
OINTMENT TOPICAL
Status: DISCONTINUED
Start: 2024-01-10 | End: 2024-01-10 | Stop reason: HOSPADM

## 2024-01-10 RX ORDER — FENTANYL CITRATE 50 UG/ML
INJECTION, SOLUTION INTRAMUSCULAR; INTRAVENOUS PRN
Status: COMPLETED | OUTPATIENT
Start: 2024-01-10 | End: 2024-01-10

## 2024-01-10 RX ORDER — MIDAZOLAM HYDROCHLORIDE 1 MG/ML
INJECTION INTRAMUSCULAR; INTRAVENOUS
Status: DISCONTINUED
Start: 2024-01-10 | End: 2024-01-10 | Stop reason: HOSPADM

## 2024-01-10 RX ORDER — LIDOCAINE HYDROCHLORIDE 20 MG/ML
SOLUTION OROPHARYNGEAL
Status: DISCONTINUED
Start: 2024-01-10 | End: 2024-01-10 | Stop reason: HOSPADM

## 2024-01-10 RX ORDER — LIDOCAINE HYDROCHLORIDE 20 MG/ML
SOLUTION OROPHARYNGEAL PRN
Status: COMPLETED | OUTPATIENT
Start: 2024-01-10 | End: 2024-01-10

## 2024-01-10 RX ORDER — LIDOCAINE 50 MG/G
OINTMENT TOPICAL PRN
Status: COMPLETED | OUTPATIENT
Start: 2024-01-10 | End: 2024-01-10

## 2024-01-10 RX ORDER — MIDAZOLAM HYDROCHLORIDE 1 MG/ML
INJECTION INTRAMUSCULAR; INTRAVENOUS PRN
Status: COMPLETED | OUTPATIENT
Start: 2024-01-10 | End: 2024-01-10

## 2024-01-10 RX ORDER — FENTANYL CITRATE 50 UG/ML
INJECTION, SOLUTION INTRAMUSCULAR; INTRAVENOUS
Status: DISCONTINUED
Start: 2024-01-10 | End: 2024-01-10 | Stop reason: HOSPADM

## 2024-01-10 RX ADMIN — FENTANYL CITRATE 50 MCG: 50 INJECTION, SOLUTION INTRAMUSCULAR; INTRAVENOUS at 11:29

## 2024-01-10 RX ADMIN — MIDAZOLAM HYDROCHLORIDE 2 MG: 1 INJECTION, SOLUTION INTRAMUSCULAR; INTRAVENOUS at 11:32

## 2024-01-10 RX ADMIN — MIDAZOLAM HYDROCHLORIDE 2 MG: 1 INJECTION, SOLUTION INTRAMUSCULAR; INTRAVENOUS at 11:29

## 2024-01-10 RX ADMIN — LIDOCAINE 5 ML: 50 OINTMENT TOPICAL at 11:22

## 2024-01-10 RX ADMIN — LIDOCAINE HYDROCHLORIDE 15 ML: 20 SOLUTION ORAL; TOPICAL at 11:14

## 2024-01-10 RX ADMIN — FENTANYL CITRATE 25 MCG: 50 INJECTION, SOLUTION INTRAMUSCULAR; INTRAVENOUS at 11:32

## 2024-01-10 NOTE — PROGRESS NOTES
Patient arrived to Non-Invasive Cardiology Lab for Out Patient DUARTE/DCCV Procedure. Staff introduced to patient. Patient identifiers verified with Name and Date of Birth. Procedure verified with patient. Consent forms reviewed and signed by patient or authorized representative and verified. Allergies verified. Patient informed of procedure and plan of care. Questions answered with review.     Patient on cardiac monitor, non-invasive blood pressure, SPO2 monitor. Patient is A&Ox3. Patient reports no complaints. Patient belongings and valuables to remain in the room with patient.    Patient on stretcher, in low position, with side rails up and brakes locked. Patient instructed to call for assistance as needed.    Family in waiting room.

## 2024-01-10 NOTE — INTERVAL H&P NOTE
Update History & Physical    The patient's History and Physical of January 5, 2024 was reviewed with the patient and I examined the patient. There was no change. The surgical site was confirmed by the patient and me.     Plan: The risks, benefits, expected outcome, and alternative to the recommended procedure have been discussed with the patient. Patient understands and wants to proceed with the procedure.     Electronically signed by MAMI POLANCO MD on 1/10/2024 at 11:19 AM

## 2024-01-10 NOTE — PROGRESS NOTES
Discharge instructions reviewed with patient and wife. Allowed adequate time to ask questions, all questions answered. Printed copy of AVS given to patient. All belongings gathered, IV and tele discontinued. Transported via wheelchair to main entrance and into care of family.

## 2024-01-10 NOTE — PRE SEDATION
Sedation Plan  ASA: class 2 - patient with mild systemic disease     Mallampati class: II - soft palate, uvula, fauces visible.    Sedation plan: local anesthesia and moderate (conscious sedation)    Risks, benefits, and alternatives discussed with patient.        Immediate reassessment prior to sedation:  Patient's status reviewed and vital signs assessed; acceptable to perform procedure and proceed to administer sedation as planned.

## 2024-01-15 ENCOUNTER — TELEPHONE (OUTPATIENT)
Age: 74
End: 2024-01-15

## 2024-01-15 LAB — ECHO BSA: 2.23 M2

## 2024-01-15 NOTE — TELEPHONE ENCOUNTER
Anitha mccarthy/[facility] called ? If Dr. Ling will provide cardiac clearance for patient  surgery would like callback    Anitha or Ricarda# [phone] option 2    Spoke with  Ricarda , identified the pt with name and . That we need request to be in writing. I gave her the Fax number

## 2024-01-15 NOTE — TELEPHONE ENCOUNTER
Anitha mccarthy/[facility] called ? If Dr. Ling will provide cardiac clearance for patient  surgery would like callback    Anitha or Ricarda# [phone] option 2

## 2024-01-16 ENCOUNTER — CLINICAL DOCUMENTATION (OUTPATIENT)
Age: 74
End: 2024-01-16

## 2024-01-16 NOTE — PROGRESS NOTES
Received VO from Regina Dodge NP:    Pt low to moderate risk. Will need to wait until at least 2/5/24 for surgery so as to have Eliquis on board 30 days.     At that time re: holding anticoagulation for procedure.   May hold Eliquis for 2 days prior. Restart anticoagulation per surgeon, as soon as possible.       Risk stratification and Medication hold faxed to [facility] @ [phone], back surgery

## 2024-03-18 ENCOUNTER — OFFICE VISIT (OUTPATIENT)
Age: 74
End: 2024-03-18
Payer: MEDICARE

## 2024-03-18 VITALS
SYSTOLIC BLOOD PRESSURE: 138 MMHG | DIASTOLIC BLOOD PRESSURE: 70 MMHG | HEART RATE: 54 BPM | HEIGHT: 69 IN | WEIGHT: 215.4 LBS | OXYGEN SATURATION: 98 % | BODY MASS INDEX: 31.9 KG/M2

## 2024-03-18 DIAGNOSIS — I10 ESSENTIAL (PRIMARY) HYPERTENSION: ICD-10-CM

## 2024-03-18 DIAGNOSIS — I48.0 PAROXYSMAL ATRIAL FIBRILLATION (HCC): Primary | ICD-10-CM

## 2024-03-18 DIAGNOSIS — I25.83 CORONARY ARTERY DISEASE DUE TO LIPID RICH PLAQUE: ICD-10-CM

## 2024-03-18 DIAGNOSIS — I25.10 CORONARY ARTERY DISEASE DUE TO LIPID RICH PLAQUE: ICD-10-CM

## 2024-03-18 PROCEDURE — G8484 FLU IMMUNIZE NO ADMIN: HCPCS | Performed by: INTERNAL MEDICINE

## 2024-03-18 PROCEDURE — 1036F TOBACCO NON-USER: CPT | Performed by: INTERNAL MEDICINE

## 2024-03-18 PROCEDURE — 99214 OFFICE O/P EST MOD 30 MIN: CPT | Performed by: INTERNAL MEDICINE

## 2024-03-18 PROCEDURE — 3017F COLORECTAL CA SCREEN DOC REV: CPT | Performed by: INTERNAL MEDICINE

## 2024-03-18 PROCEDURE — 3078F DIAST BP <80 MM HG: CPT | Performed by: INTERNAL MEDICINE

## 2024-03-18 PROCEDURE — G8417 CALC BMI ABV UP PARAM F/U: HCPCS | Performed by: INTERNAL MEDICINE

## 2024-03-18 PROCEDURE — 3075F SYST BP GE 130 - 139MM HG: CPT | Performed by: INTERNAL MEDICINE

## 2024-03-18 PROCEDURE — G8427 DOCREV CUR MEDS BY ELIG CLIN: HCPCS | Performed by: INTERNAL MEDICINE

## 2024-03-18 PROCEDURE — 1123F ACP DISCUSS/DSCN MKR DOCD: CPT | Performed by: INTERNAL MEDICINE

## 2024-03-18 RX ORDER — ATORVASTATIN CALCIUM 40 MG/1
40 TABLET, FILM COATED ORAL DAILY
COMMUNITY
Start: 2024-02-29

## 2024-03-18 RX ORDER — AMIODARONE HYDROCHLORIDE 200 MG/1
100 TABLET ORAL DAILY
Qty: 45 TABLET | Refills: 3 | Status: SHIPPED | OUTPATIENT
Start: 2024-03-18

## 2024-03-18 NOTE — PATIENT INSTRUCTIONS
Change Amiodarone to HALF tablet of 200mg po daily.     Refer to Dr. Mcmullen for WATCHMAN procedure    See Dr. Ling in 4 months.

## 2024-06-21 RX ORDER — LISINOPRIL 10 MG/1
10 TABLET ORAL DAILY
Qty: 90 TABLET | Refills: 2 | Status: SHIPPED | OUTPATIENT
Start: 2024-06-21

## 2024-06-21 RX ORDER — METOPROLOL SUCCINATE 100 MG/1
100 TABLET, EXTENDED RELEASE ORAL DAILY
Qty: 90 TABLET | Refills: 2 | Status: SHIPPED | OUTPATIENT
Start: 2024-06-21

## 2024-06-21 NOTE — TELEPHONE ENCOUNTER
Refill per VO of Dr. Cash Ling:    3/18/2024    Future Appointments   Date Time Provider Department Center   8/1/2024 10:00 AM Cash Ling MD [facility] AMB       Requested Prescriptions     Pending Prescriptions Disp Refills    lisinopril (PRINIVIL;ZESTRIL) 10 MG tablet [Pharmacy Med Name: LISINOPRIL 10 MG TABLET] 90 tablet      Sig: TAKE ONE TABLET BY MOUTH DAILY    metoprolol succinate (TOPROL XL) 100 MG extended release tablet [Pharmacy Med Name: METOPROLOL SUCC  MG TAB] 90 tablet      Sig: TAKE ONE TABLET BY MOUTH DAILY

## 2024-06-24 RX ORDER — AMIODARONE HYDROCHLORIDE 200 MG/1
200 TABLET ORAL 2 TIMES DAILY
Qty: 180 TABLET | OUTPATIENT
Start: 2024-06-24

## 2024-06-24 NOTE — TELEPHONE ENCOUNTER
Refill per VO of Dr. Cash Ling:    3/18/2024    Future Appointments   Date Time Provider Department Center   8/1/2024 10:00 AM Cash Ling MD [facility] AMB       Requested Prescriptions     Pending Prescriptions Disp Refills    amiodarone (CORDARONE) 200 MG tablet [Pharmacy Med Name: AMIODARONE  MG TABLET] 180 tablet      Sig: TAKE 1 TABLET BY MOUTH TWICE A DAY

## 2024-08-01 ENCOUNTER — OFFICE VISIT (OUTPATIENT)
Age: 74
End: 2024-08-01
Payer: MEDICARE

## 2024-08-01 VITALS
BODY MASS INDEX: 28.14 KG/M2 | HEIGHT: 69 IN | WEIGHT: 190 LBS | HEART RATE: 62 BPM | SYSTOLIC BLOOD PRESSURE: 128 MMHG | DIASTOLIC BLOOD PRESSURE: 82 MMHG | OXYGEN SATURATION: 98 %

## 2024-08-01 DIAGNOSIS — I25.10 CORONARY ARTERY DISEASE DUE TO LIPID RICH PLAQUE: ICD-10-CM

## 2024-08-01 DIAGNOSIS — I48.0 PAROXYSMAL ATRIAL FIBRILLATION (HCC): Primary | ICD-10-CM

## 2024-08-01 DIAGNOSIS — I25.83 CORONARY ARTERY DISEASE DUE TO LIPID RICH PLAQUE: ICD-10-CM

## 2024-08-01 DIAGNOSIS — E78.2 MIXED HYPERLIPIDEMIA: ICD-10-CM

## 2024-08-01 DIAGNOSIS — I10 ESSENTIAL (PRIMARY) HYPERTENSION: ICD-10-CM

## 2024-08-01 PROCEDURE — 3017F COLORECTAL CA SCREEN DOC REV: CPT | Performed by: INTERNAL MEDICINE

## 2024-08-01 PROCEDURE — G8427 DOCREV CUR MEDS BY ELIG CLIN: HCPCS | Performed by: INTERNAL MEDICINE

## 2024-08-01 PROCEDURE — 99214 OFFICE O/P EST MOD 30 MIN: CPT | Performed by: INTERNAL MEDICINE

## 2024-08-01 PROCEDURE — 3074F SYST BP LT 130 MM HG: CPT | Performed by: INTERNAL MEDICINE

## 2024-08-01 PROCEDURE — 1123F ACP DISCUSS/DSCN MKR DOCD: CPT | Performed by: INTERNAL MEDICINE

## 2024-08-01 PROCEDURE — 93005 ELECTROCARDIOGRAM TRACING: CPT | Performed by: INTERNAL MEDICINE

## 2024-08-01 PROCEDURE — 93010 ELECTROCARDIOGRAM REPORT: CPT | Performed by: INTERNAL MEDICINE

## 2024-08-01 PROCEDURE — G8417 CALC BMI ABV UP PARAM F/U: HCPCS | Performed by: INTERNAL MEDICINE

## 2024-08-01 PROCEDURE — 1036F TOBACCO NON-USER: CPT | Performed by: INTERNAL MEDICINE

## 2024-08-01 PROCEDURE — 3079F DIAST BP 80-89 MM HG: CPT | Performed by: INTERNAL MEDICINE

## 2024-08-01 RX ORDER — AMIODARONE HYDROCHLORIDE 200 MG/1
200 TABLET ORAL DAILY
Qty: 90 TABLET | Refills: 3 | Status: SHIPPED | OUTPATIENT
Start: 2024-08-01

## 2024-08-01 NOTE — PATIENT INSTRUCTIONS
Increase Amiodarone to 200mg po daily.     Refer to EP for WATCHMAN.     See Dr. Ling in 6 months.

## 2024-08-01 NOTE — PROGRESS NOTES
Chief Complaint   Patient presents with    Atrial Fibrillation    Coronary Artery Disease    Hypertension     Vitals:    08/01/24 0954   BP: 128/82   Site: Left Upper Arm   Position: Sitting   Pulse: 62   SpO2: 98%   Weight: 86.2 kg (190 lb)   Height: 1.753 m (5' 9\")         Chest pain: DENIED     Recent hospital stays: DENIED     Refills: DENIED   
Received VO from Dr. Cash Ling:      Increase Amiodarone to 200mg po daily.      Refer to EP for WATCHMAN.      See Dr. Ling in 6 months.               
to mood  Eyes - sclera anicteric, moist mucous membranes  Neck - supple, no significant adenopathy      Medications:     Current Outpatient Medications   Medication Sig Dispense Refill    lisinopril (PRINIVIL;ZESTRIL) 10 MG tablet TAKE ONE TABLET BY MOUTH DAILY 90 tablet 2    metoprolol succinate (TOPROL XL) 100 MG extended release tablet TAKE ONE TABLET BY MOUTH DAILY 90 tablet 2    atorvastatin (LIPITOR) 40 MG tablet Take 1 tablet by mouth daily      amiodarone (CORDARONE) 200 MG tablet Take 0.5 tablets by mouth daily 45 tablet 3    cyclobenzaprine (FLEXERIL) 5 MG tablet Take 1 tablet by mouth 2 times daily as needed for Muscle spasms      dapagliflozin (FARXIGA) 10 MG tablet Take 0.5 tablets by mouth every morning      metFORMIN (GLUCOPHAGE) 500 MG tablet Take 2 tablets by mouth 2 times daily (with meals)      acetaminophen (TYLENOL) 325 MG tablet Take 2 tablets by mouth every 6 hours as needed for Pain      Multiple Vitamin (MULTIVITAMIN ADULT PO) Take 1 tablet by mouth daily      tadalafil (CIALIS) 20 MG tablet Cialis 20 mg tablet   1 tab po q 36 hrs, prn      ergocalciferol (ERGOCALCIFEROL) 1.25 MG (10039 UT) capsule 1 capsule Twice a Week      naproxen (NAPROSYN) 250 MG tablet Take 1 tablet by mouth as needed      apixaban (ELIQUIS) 5 MG TABS tablet Take 1 tablet by mouth 2 times daily (Patient not taking: Reported on 8/1/2024) 28 tablet 0     No current facility-administered medications for this visit.          Diagnostic Data Review:       4/3/17 Spect lexiscan: LVEF 55%; small area of mod intensity basal to mid inferior wall infarct with mild clyde-infarct ischemia.     Stress test cardiolite:5/4/11- basal and mid inferior ischemia, EF 56%    Cardiac catheterization:9/29/2011- LVEDP 23, LVG EF 50%, moderate inferior HK, LM normal, LAD calcified prox 60%, circumflex prox stent patent, OM 1 prox stent patent, RCA long prox/mid stent with 99% in-stent restenosis, T2 flow. Distal short stent probably patent but

## 2024-10-22 ENCOUNTER — OFFICE VISIT (OUTPATIENT)
Age: 74
End: 2024-10-22
Payer: MEDICARE

## 2024-10-22 ENCOUNTER — TELEPHONE (OUTPATIENT)
Facility: HOSPITAL | Age: 74
End: 2024-10-22

## 2024-10-22 VITALS
DIASTOLIC BLOOD PRESSURE: 82 MMHG | WEIGHT: 186.8 LBS | HEIGHT: 69 IN | OXYGEN SATURATION: 98 % | SYSTOLIC BLOOD PRESSURE: 140 MMHG | HEART RATE: 81 BPM | BODY MASS INDEX: 27.67 KG/M2

## 2024-10-22 DIAGNOSIS — R29.6 FALLS FREQUENTLY: ICD-10-CM

## 2024-10-22 DIAGNOSIS — I25.83 CORONARY ARTERY DISEASE DUE TO LIPID RICH PLAQUE: ICD-10-CM

## 2024-10-22 DIAGNOSIS — R27.0 ATAXIA: ICD-10-CM

## 2024-10-22 DIAGNOSIS — I10 PRIMARY HYPERTENSION: ICD-10-CM

## 2024-10-22 DIAGNOSIS — I48.19 PERSISTENT ATRIAL FIBRILLATION (HCC): Primary | ICD-10-CM

## 2024-10-22 DIAGNOSIS — I48.0 PAROXYSMAL ATRIAL FIBRILLATION (HCC): ICD-10-CM

## 2024-10-22 DIAGNOSIS — M48.062 NEUROGENIC CLAUDICATION DUE TO LUMBAR SPINAL STENOSIS: ICD-10-CM

## 2024-10-22 DIAGNOSIS — I25.10 CORONARY ARTERY DISEASE DUE TO LIPID RICH PLAQUE: ICD-10-CM

## 2024-10-22 DIAGNOSIS — Z79.01 ANTICOAGULATED: ICD-10-CM

## 2024-10-22 PROCEDURE — 1036F TOBACCO NON-USER: CPT | Performed by: HOSPITALIST

## 2024-10-22 PROCEDURE — 1123F ACP DISCUSS/DSCN MKR DOCD: CPT | Performed by: HOSPITALIST

## 2024-10-22 PROCEDURE — 3077F SYST BP >= 140 MM HG: CPT | Performed by: HOSPITALIST

## 2024-10-22 PROCEDURE — G8484 FLU IMMUNIZE NO ADMIN: HCPCS | Performed by: HOSPITALIST

## 2024-10-22 PROCEDURE — 99214 OFFICE O/P EST MOD 30 MIN: CPT | Performed by: HOSPITALIST

## 2024-10-22 PROCEDURE — 3079F DIAST BP 80-89 MM HG: CPT | Performed by: HOSPITALIST

## 2024-10-22 PROCEDURE — G8417 CALC BMI ABV UP PARAM F/U: HCPCS | Performed by: HOSPITALIST

## 2024-10-22 PROCEDURE — 93010 ELECTROCARDIOGRAM REPORT: CPT | Performed by: HOSPITALIST

## 2024-10-22 PROCEDURE — 93005 ELECTROCARDIOGRAM TRACING: CPT | Performed by: HOSPITALIST

## 2024-10-22 PROCEDURE — 3017F COLORECTAL CA SCREEN DOC REV: CPT | Performed by: HOSPITALIST

## 2024-10-22 PROCEDURE — 1159F MED LIST DOCD IN RCRD: CPT | Performed by: HOSPITALIST

## 2024-10-22 PROCEDURE — 1160F RVW MEDS BY RX/DR IN RCRD: CPT | Performed by: HOSPITALIST

## 2024-10-22 PROCEDURE — G8427 DOCREV CUR MEDS BY ELIG CLIN: HCPCS | Performed by: HOSPITALIST

## 2024-10-22 RX ORDER — WARFARIN SODIUM 5 MG/1
5 TABLET ORAL DAILY
Qty: 30 TABLET | Refills: 5 | Status: SHIPPED | OUTPATIENT
Start: 2024-10-22

## 2024-10-22 RX ORDER — AMANTADINE HYDROCHLORIDE 100 MG/1
100 CAPSULE, GELATIN COATED ORAL 2 TIMES DAILY
COMMUNITY
Start: 2024-03-25

## 2024-10-22 RX ORDER — SENNOSIDES A AND B 8.6 MG/1
TABLET, FILM COATED ORAL PRN
COMMUNITY

## 2024-10-22 RX ORDER — ASPIRIN 325 MG
2 TABLET ORAL DAILY
COMMUNITY

## 2024-10-22 RX ORDER — CARBIDOPA AND LEVODOPA 25; 100 MG/1; MG/1
1 TABLET ORAL 3 TIMES DAILY
COMMUNITY
Start: 2024-05-02 | End: 2025-07-29

## 2024-10-22 NOTE — PROGRESS NOTES
Chief Complaint   Patient presents with    Atrial Fibrillation     Vitals:    10/22/24 1301   BP: (!) 140/82   Site: Left Upper Arm   Position: Sitting   Pulse: 81   SpO2: 98%   Weight: 84.7 kg (186 lb 12.8 oz)   Height: 1.753 m (5' 9\")     Chest pain: DENIED     Recent hospital stays: DENIED     Refills: DENIED

## 2024-10-22 NOTE — TELEPHONE ENCOUNTER
[facility] - New Referral     The [facility] received a new referral for anticoagulation services. Patient is greater than 70 years old. Therefore, patient has been informed to start warfarin 2.5 mg (0.5 tablet) daily in the evening. Patient reports he will start warfarin therapy tomorrow (10/23) or Thursday (10/24). Reviewed common drug-drug interactions with patient regarding prescription and OTC medications. Informed patient to contact the [facility] Management Clinic if an antibiotic or steroid is initiated. Discussed with patient avoiding NSAID use while taking warfarin therapy. Informed patient APAP is the safest OTC pain reliever to take with warfarin therapy. Discussed with patient the effect vitamin K foods have on the INR. Patient reported he will avoid foods with vitamin K. Patient reported the following medication changes: adjust APAP to 1000 mg BID PRN, adjust metformin to 500 mg BID, discontinuation of naproxen, adjust multivitamin to 2 gummies daily, addition of Probiotic: 1 capsule daily, addition of Sugar Defender daily, and addition of senna 8.6 mg PRN. Updated patient's medication list.  Patient's first INR check has been scheduled for Tuesday, October 29, 2024 at 12:30 pm. Patient was provided the location of the clinic. Patient verbalized understanding of the information presented above.     Thank you.  Steffanie Dawkins, JosueD

## 2024-10-22 NOTE — PROGRESS NOTES
Cardiac Electrophysiology OFFICE Consultation Note     Subjective:      Herbie Kamara is a pleasant 74 y.o. male.  He is a resident of [facility] [address]    Primary Cardiologist: Cash Ling MD    He is retired. He lives with his wife.    Herbie Kamara presents to electrophysiology clinic for management of Atrial Arrythmias.    His current medical conditions and PMH are detailed below.    Today's visit:  Date: 10/22/2024     He reports that he has ataxia and balance issues.  He has had some bruising issues with the falls.  He is not taking Eliquis because it is expensive.  He is not on any anticoagulation right now.  He reports that previously he felt much better and had more energy when he was in sinus rhythm after the cardioversion.  Currently he remains in atrial fibrillation.  He is here to discuss about Watchman procedure.        Assessment:       ICD-10-CM    1. Persistent atrial fibrillation (HCC)  I48.19 EKG 12 Lead     Ambulatory Referral to Anticoagulation Monitoring     CBC     Basic Metabolic Panel     TSH     Hepatic Function Panel     CTA CHEST W WO CONTRAST      2. Anticoagulated  Z79.01 Ambulatory Referral to Anticoagulation Monitoring     CBC     Basic Metabolic Panel     TSH     Hepatic Function Panel     CTA CHEST W WO CONTRAST      3. Coronary artery disease due to lipid rich plaque  I25.10 Ambulatory Referral to Anticoagulation Monitoring    I25.83 CBC     Basic Metabolic Panel     TSH     Hepatic Function Panel     CTA CHEST W WO CONTRAST      4. Primary hypertension  I10 Ambulatory Referral to Anticoagulation Monitoring     CBC     Basic Metabolic Panel     TSH     Hepatic Function Panel     CTA CHEST W WO CONTRAST      5. Ataxia  R27.0 Ambulatory Referral to Anticoagulation Monitoring     CBC     Basic Metabolic Panel     TSH     Hepatic Function Panel     CTA CHEST W WO CONTRAST      6. Falls frequently  R29.6 Ambulatory Referral to Anticoagulation Monitoring     CBC

## 2024-10-22 NOTE — PATIENT INSTRUCTIONS
--start Warfarin 5mg daily  --Anticoagulation clinic for INR monitoring  --stop amiodarone  --Schedule for Watchman procedure and Cardioversion  --TSH, LFTs  --Fall precautions    You are scheduled for the following procedure with Dr. BEACH: Watchman implantation/DUARTE at [facility], [address] [place] [id number]        PLEASE be aware that your procedure date/time is tentative and subject to change due to emergency cases.    Any changes to your procedure date/time will be communicated by the hospital staff.      Procedure date/time:    Friday November 29, 2024 at  11:30 am- please arrive by  9:30 am      ARRIVAL time:  (You will need  to be discharged home with.)       [X]  [facility] procedures: arrive to check in on 1st floor near  entrance two hours prior to your procedure.      Pre-procedure Labs/Imaging:    PRE-PROCEDURE LABS NEEDED: Yes  -  please have these done after 11/15/2024   Forms for labwork may be given at appointment. If not, they will be mailed to you. Labs should be completed within 5-7 days of procedure. These can be done at any [facility] or [facility] lab.          [facility]  [address], [address]  [place], [place] [address]  Monday-Friday 730A-430P (closed 1230-130P)  [phone]    [address], [address]   [place] [address]  Monday-Friday 8:00AM - 5:00 PM   [phone]    [facility]  [address], [address]  [place], [place] [address]  Monday-Friday 7A-5P  [phone]    [facility]  [address], [address]  [place], [place] [address]  Monday-Friday 730A-430P  [phone] (closed 1-2P)    [facility]  [address] , [address]  [place], [place] [address]  Monday-Friday 730A-430P (closed 1230-130P)  [phone]    [facility]  [address] [place], [place]

## 2024-10-22 NOTE — PROGRESS NOTES
DASHAWN PREP NOTE  Please fill out subjective and AF history below.  ***Dr. Lynn will DELETE before signing visit  ===========================================    Primary Cardiologist: Cash Ling MD    He {OFFICEprofession:83180::\"is ***\"}.    Herbie Kamara presents to electrophysiology clinic for management of {OFFICEFirstLineReasonForVisit:02977::\"Arrhythmia Issues\"}.    His current medical conditions and PMH are detailed below.      Today's visit:  Date: 10/22/2024     PMH PAF, CAD, HTN and sleep apnea. He has undergone several cardioversions, most recently 1/5/24. He is on amiodarone but was noted to be in AF at last visit with Dr. Ling 8/1/24.    Denies palpitations***, dizziness, syncope and fatigue***. Denies chest pain***. Denies shortness of breath, paroxysmal nocturnal dyspnea, orthopnea and lower extremity edema***.      # Paroxysmal Atrial Fibrillation  [id number] score of 3 [for HTN, Age-1, and Vascular(MI/CAD)]  Anticoagulation: previously took Eliquis but not taking due to cost and recurrent falls  Onset: 2019  Symptoms: ***  Alcohol: ***  DORIS: uses dental device   Prior AAD: amiodarone   Current Meds/AAD: amiodarone and metoprolol   Prior Cardioversion: 2019, 4/1/21, 1/5/24  Ablation: none  LA size: ***    Anticoagulation  - Currently off Eliquis despite CHADS-VASc of three due to cost and falls. His CHADS-VASc is almost 4 for age greater than 75. He has slow mobility but Parkinson's has been ruled out.   - Discussed Watchman implant.     CAD  Remote stent to RCA requiring re-stenting in 2011     Hypertension   - Continue lisinopril

## 2024-10-29 ENCOUNTER — ANTI-COAG VISIT (OUTPATIENT)
Facility: HOSPITAL | Age: 74
End: 2024-10-29
Payer: MEDICARE

## 2024-10-29 DIAGNOSIS — I48.19 PERSISTENT ATRIAL FIBRILLATION (HCC): Primary | ICD-10-CM

## 2024-10-29 LAB
INTERNATIONAL NORMALIZATION RATIO, POC: 1.7 (ref 2–3)
PROTHROMBIN TIME, POC: 20.4

## 2024-10-29 PROCEDURE — 99212 OFFICE O/P EST SF 10 MIN: CPT

## 2024-10-29 PROCEDURE — 85610 PROTHROMBIN TIME: CPT

## 2024-10-29 NOTE — PROGRESS NOTES
Pharmacy Progress Note - Anticoagulation Management    S/O:  Mr. Herbie Kamara  is a 74 y.o. male seen today for anticoagulation management for the diagnosis of Atrial Fibrillation.     HPI: Patient referred to the [facility] for anticoagulation management for atrial fibrillation.    Interim History:    Warfarin start date: 10/24/24  INR Goal:  2.0-3.0    Current warfarin regimen: 2.5 mg daily                      Warfarin tablet strength:   5 mg   Duration of therapy: Indefinite      Results for orders placed or performed in visit on 10/29/24   POCT INR   Result Value Ref Range    Prothrombin time,(POC) 20.4     INR,(POC) 1.7 (A) 2.0 - 3.0       Today's pertinent positives includes:  No significant changes since last visit      Adherence:   Able to recall regimen? YES  Miss/extra dose? YES, patient reports missing his warfarin 2.5 mg dose on Sunday (10/27); however, he reports taking warfarin 5 mg on Monday (10/28) to make up for the missed warfarin dose.  Need refill? NO    Upcoming procedure(s):  YES    Patient reports an upcoming watchman procedure on 11/29/24.    Past Medical History:   Diagnosis Date    CAD (coronary artery disease)     Diabetes (HCC)     Essential hypertension     Hyperlipidemia     Hypertension     Ill-defined condition     lumbar stenosis     Allergies   Allergen Reactions    Metformin      Other reaction(s): Unknown (comments)    Niacin Other (See Comments)     Flushing and itching    Statins Myalgia     Current Outpatient Medications   Medication Sig    carbidopa-levodopa (SINEMET)  MG per tablet Take 1 tablet by mouth 3 times daily    amantadine (SYMMETREL) 100 MG capsule Take 1 capsule by mouth 2 times daily    warfarin (COUMADIN) 5 MG tablet Take 1 tablet by mouth daily    Multiple Vitamins-Minerals (MULTIVITAMIN GUMMIES ADULT) CHEW Take 2 gums by mouth daily    senna (SENNA LAX) 8.6 MG tablet Take by mouth as needed for Constipation    Bacillus

## 2024-10-29 NOTE — PATIENT INSTRUCTIONS
sprouts.  Vegetable drinks, green tea leaves, and some dietary supplement drinks.  Avoid cranberry juice and other cranberry products. They can increase the effects of warfarin.  Limit your use of alcohol.    Avoid bleeding by preventing falls and injuries  Wear slippers or shoes with nonskid soles.  Remove throw rugs and clutter.  Rearrange furniture and electrical cords to keep them out of walking paths.  Keep stairways, porches, and outside walkways well lit. Use night-lights in hallways and bathrooms.  Be extra careful when you work with sharp tools or knives.    When should you call for help?  Call 911 anytime you think you may need emergency care. For example, call if:  You have a sudden, severe headache that is different from past headaches.  Call your doctor now or seek immediate medical care if:  You have any abnormal bleeding, such as:  Nosebleeds.  Vaginal bleeding that is different (heavier, more frequent, at a different time of the month) than what you are used to.  Bloody or black stools, or rectal bleeding.  Bloody or pink urine.  Watch closely for changes in your health, and be sure to contact your doctor if you have any problems.    Where can you learn more?  Go to http://www.[employer].net/[employer].  Enter N655 in the search box to learn more about \"Taking Warfarin Safely: Care Instructions.\"  Current as of: January 27, 2016  Content Version: 11.1  © 8406-2497 [employer]. Care instructions adapted under license by [employer] (which disclaims liability or warranty for this information). If you have questions about a medical condition or this instruction, always ask your healthcare professional. [employer] disclaims any warranty or liability for your use of this information.

## 2024-11-01 ENCOUNTER — HOSPITAL ENCOUNTER (EMERGENCY)
Facility: HOSPITAL | Age: 74
Discharge: HOME | End: 2024-11-01
Attending: EMERGENCY MEDICINE
Payer: MEDICARE

## 2024-11-01 ENCOUNTER — APPOINTMENT (OUTPATIENT)
Facility: HOSPITAL | Age: 74
End: 2024-11-01
Payer: MEDICARE

## 2024-11-01 VITALS
TEMPERATURE: 97.9 F | DIASTOLIC BLOOD PRESSURE: 74 MMHG | RESPIRATION RATE: 21 BRPM | HEIGHT: 69 IN | OXYGEN SATURATION: 95 % | WEIGHT: 192.46 LBS | SYSTOLIC BLOOD PRESSURE: 135 MMHG | BODY MASS INDEX: 28.51 KG/M2 | HEART RATE: 104 BPM

## 2024-11-01 DIAGNOSIS — R79.1 ELEVATED INR: ICD-10-CM

## 2024-11-01 DIAGNOSIS — R81 GLUCOSURIA: ICD-10-CM

## 2024-11-01 DIAGNOSIS — I48.0 PAF (PAROXYSMAL ATRIAL FIBRILLATION) (HCC): Primary | ICD-10-CM

## 2024-11-01 LAB
ALBUMIN SERPL-MCNC: 3.2 G/DL (ref 3.5–5)
ALBUMIN/GLOB SERPL: 0.8 (ref 1.1–2.2)
ALP SERPL-CCNC: 223 U/L (ref 45–117)
ALT SERPL-CCNC: 70 U/L (ref 12–78)
ANION GAP SERPL CALC-SCNC: 7 MMOL/L (ref 2–12)
APPEARANCE UR: CLEAR
AST SERPL-CCNC: 50 U/L (ref 15–37)
BACTERIA URNS QL MICRO: NEGATIVE /HPF
BASOPHILS # BLD: 0 K/UL (ref 0–0.1)
BASOPHILS NFR BLD: 0 % (ref 0–1)
BILIRUB SERPL-MCNC: 0.5 MG/DL (ref 0.2–1)
BILIRUB UR QL: NEGATIVE
BUN SERPL-MCNC: 13 MG/DL (ref 6–20)
BUN/CREAT SERPL: 13 (ref 12–20)
CALCIUM SERPL-MCNC: 9.7 MG/DL (ref 8.5–10.1)
CHLORIDE SERPL-SCNC: 103 MMOL/L (ref 97–108)
CO2 SERPL-SCNC: 26 MMOL/L (ref 21–32)
COLOR UR: ABNORMAL
COMMENT:: NORMAL
CREAT SERPL-MCNC: 0.99 MG/DL (ref 0.7–1.3)
DIFFERENTIAL METHOD BLD: ABNORMAL
EKG ATRIAL RATE: 66 BPM
EKG DIAGNOSIS: NORMAL
EKG Q-T INTERVAL: 366 MS
EKG QRS DURATION: 106 MS
EKG QTC CALCULATION (BAZETT): 488 MS
EKG R AXIS: -6 DEGREES
EKG T AXIS: 125 DEGREES
EKG VENTRICULAR RATE: 107 BPM
EOSINOPHIL # BLD: 0.2 K/UL (ref 0–0.4)
EOSINOPHIL NFR BLD: 2 % (ref 0–7)
EPITH CASTS URNS QL MICRO: ABNORMAL /LPF
ERYTHROCYTE [DISTWIDTH] IN BLOOD BY AUTOMATED COUNT: 12.9 % (ref 11.5–14.5)
GLOBULIN SER CALC-MCNC: 3.8 G/DL (ref 2–4)
GLUCOSE SERPL-MCNC: 153 MG/DL (ref 65–100)
GLUCOSE UR STRIP.AUTO-MCNC: >1000 MG/DL
HCT VFR BLD AUTO: 37.9 % (ref 36.6–50.3)
HGB BLD-MCNC: 11.9 G/DL (ref 12.1–17)
HGB UR QL STRIP: NEGATIVE
HYALINE CASTS URNS QL MICRO: ABNORMAL /LPF (ref 0–2)
IMM GRANULOCYTES # BLD AUTO: 0 K/UL (ref 0–0.04)
IMM GRANULOCYTES NFR BLD AUTO: 0 % (ref 0–0.5)
INR PPP: 4.6 (ref 0.9–1.1)
KETONES UR QL STRIP.AUTO: 80 MG/DL
LEUKOCYTE ESTERASE UR QL STRIP.AUTO: NEGATIVE
LYMPHOCYTES # BLD: 1.5 K/UL (ref 0.8–3.5)
LYMPHOCYTES NFR BLD: 14 % (ref 12–49)
MCH RBC QN AUTO: 29.5 PG (ref 26–34)
MCHC RBC AUTO-ENTMCNC: 31.4 G/DL (ref 30–36.5)
MCV RBC AUTO: 93.8 FL (ref 80–99)
MONOCYTES # BLD: 1.2 K/UL (ref 0–1)
MONOCYTES NFR BLD: 12 % (ref 5–13)
NEUTS SEG # BLD: 7.3 K/UL (ref 1.8–8)
NEUTS SEG NFR BLD: 72 % (ref 32–75)
NITRITE UR QL STRIP.AUTO: NEGATIVE
NRBC # BLD: 0 K/UL (ref 0–0.01)
NRBC BLD-RTO: 0 PER 100 WBC
PH UR STRIP: 5 (ref 5–8)
PLATELET # BLD AUTO: 347 K/UL (ref 150–400)
PMV BLD AUTO: 9.1 FL (ref 8.9–12.9)
POTASSIUM SERPL-SCNC: 4.4 MMOL/L (ref 3.5–5.1)
PROT SERPL-MCNC: 7 G/DL (ref 6.4–8.2)
PROT UR STRIP-MCNC: NEGATIVE MG/DL
PROTHROMBIN TIME: 44.1 SEC (ref 9–11.1)
RBC # BLD AUTO: 4.04 M/UL (ref 4.1–5.7)
RBC #/AREA URNS HPF: ABNORMAL /HPF (ref 0–5)
SODIUM SERPL-SCNC: 136 MMOL/L (ref 136–145)
SP GR UR REFRACTOMETRY: 1.02 (ref 1–1.03)
SPECIMEN HOLD: NORMAL
TROPONIN I SERPL HS-MCNC: 31 NG/L (ref 0–76)
URINE CULTURE IF INDICATED: ABNORMAL
UROBILINOGEN UR QL STRIP.AUTO: 1 EU/DL (ref 0.2–1)
WBC # BLD AUTO: 10.2 K/UL (ref 4.1–11.1)
WBC URNS QL MICRO: ABNORMAL /HPF (ref 0–4)

## 2024-11-01 PROCEDURE — 85025 COMPLETE CBC W/AUTO DIFF WBC: CPT

## 2024-11-01 PROCEDURE — 93005 ELECTROCARDIOGRAM TRACING: CPT | Performed by: EMERGENCY MEDICINE

## 2024-11-01 PROCEDURE — 80053 COMPREHEN METABOLIC PANEL: CPT

## 2024-11-01 PROCEDURE — 94761 N-INVAS EAR/PLS OXIMETRY MLT: CPT

## 2024-11-01 PROCEDURE — 81001 URINALYSIS AUTO W/SCOPE: CPT

## 2024-11-01 PROCEDURE — 99285 EMERGENCY DEPT VISIT HI MDM: CPT

## 2024-11-01 PROCEDURE — 36415 COLL VENOUS BLD VENIPUNCTURE: CPT

## 2024-11-01 PROCEDURE — 71046 X-RAY EXAM CHEST 2 VIEWS: CPT

## 2024-11-01 PROCEDURE — 85610 PROTHROMBIN TIME: CPT

## 2024-11-01 PROCEDURE — 93010 ELECTROCARDIOGRAM REPORT: CPT | Performed by: SPECIALIST

## 2024-11-01 PROCEDURE — 84484 ASSAY OF TROPONIN QUANT: CPT

## 2024-11-01 ASSESSMENT — ENCOUNTER SYMPTOMS
CONSTIPATION: 0
DIARRHEA: 0
VOMITING: 0
CHEST TIGHTNESS: 0
ABDOMINAL PAIN: 0
SHORTNESS OF BREATH: 1
NAUSEA: 0
WHEEZING: 0
COUGH: 1
TROUBLE SWALLOWING: 0

## 2024-11-01 ASSESSMENT — PAIN - FUNCTIONAL ASSESSMENT: PAIN_FUNCTIONAL_ASSESSMENT: 0-10

## 2024-11-01 ASSESSMENT — PAIN SCALES - GENERAL: PAINLEVEL_OUTOF10: 0

## 2024-11-01 NOTE — ED TRIAGE NOTES
Pt arrives to the ER for complaints of generalized weakness, chest palpitations and shortness of breath that started two weeks ago.     Pt reports that he is scheduled to have a watchman placed on 11/29.     Pt reports that he has recurrent falls. Denies hitting head     Denies any chest pain, diarrhea, urinary changes,

## 2024-11-01 NOTE — ED PROVIDER NOTES
[facility] EMERGENCY DEPT  EMERGENCY DEPARTMENT ENCOUNTER      Pt Name: Herbie Kamara  MRN: [id number]  Birthdate 1950  Date of evaluation: 11/1/2024  Provider: Lashell Whyte MD    CHIEF COMPLAINT       Chief Complaint   Patient presents with    Palpitations    Shortness of Breath         HISTORY OF PRESENT ILLNESS   (Location/Symptom, Timing/Onset, Context/Setting, Quality, Duration, Modifying Factors, Severity)  Note limiting factors.   73 y/o M with PMH Afib, CAD, HTN presenting with palpitations and SOB of 2 weeks duration. Reports symptoms to be progressive, associated with cough due to his post nasal drip but has had no fevers. Recently placed on warfarin in preparation for Watchman procedure on 11/29. Has history of recurrent falls but relative at bedside denies hitting head.    Relative at bedside endorses decrease appetite and fluid intake.     The history is provided by the patient and the spouse.         Review of External Medical Records:     Nursing Notes were reviewed.    REVIEW OF SYSTEMS    (2-9 systems for level 4, 10 or more for level 5)     Review of Systems   Constitutional:  Positive for activity change and appetite change. Negative for fever.        Generalized weakness   HENT:  Negative for trouble swallowing.    Respiratory:  Positive for cough and shortness of breath. Negative for chest tightness and wheezing.    Cardiovascular:  Positive for palpitations. Negative for chest pain and leg swelling.   Gastrointestinal:  Negative for abdominal pain, constipation, diarrhea, nausea and vomiting.   Genitourinary:  Negative for difficulty urinating and dysuria.   Neurological:  Negative for syncope, light-headedness and headaches.       Except as noted above the remainder of the review of systems was reviewed and negative.       PAST MEDICAL HISTORY     Past Medical History:   Diagnosis Date    CAD (coronary artery disease)     Diabetes (HCC)     Essential hypertension

## 2024-11-05 ENCOUNTER — ANTI-COAG VISIT (OUTPATIENT)
Facility: HOSPITAL | Age: 74
End: 2024-11-05
Payer: MEDICARE

## 2024-11-05 ENCOUNTER — ANCILLARY PROCEDURE (OUTPATIENT)
Age: 74
End: 2024-11-05
Payer: MEDICARE

## 2024-11-05 VITALS
DIASTOLIC BLOOD PRESSURE: 84 MMHG | SYSTOLIC BLOOD PRESSURE: 130 MMHG | WEIGHT: 186 LBS | HEIGHT: 69 IN | HEART RATE: 80 BPM | BODY MASS INDEX: 27.55 KG/M2

## 2024-11-05 DIAGNOSIS — I25.83 CORONARY ARTERY DISEASE DUE TO LIPID RICH PLAQUE: ICD-10-CM

## 2024-11-05 DIAGNOSIS — I48.19 PERSISTENT ATRIAL FIBRILLATION (HCC): Primary | ICD-10-CM

## 2024-11-05 DIAGNOSIS — I25.10 CORONARY ARTERY DISEASE DUE TO LIPID RICH PLAQUE: ICD-10-CM

## 2024-11-05 DIAGNOSIS — I48.19 PERSISTENT ATRIAL FIBRILLATION (HCC): ICD-10-CM

## 2024-11-05 LAB
INTERNATIONAL NORMALIZATION RATIO, POC: 4 (ref 2–3)
PROTHROMBIN TIME, POC: 48.1

## 2024-11-05 PROCEDURE — 99212 OFFICE O/P EST SF 10 MIN: CPT

## 2024-11-05 PROCEDURE — 93306 TTE W/DOPPLER COMPLETE: CPT | Performed by: HOSPITALIST

## 2024-11-05 PROCEDURE — 85610 PROTHROMBIN TIME: CPT

## 2024-11-05 NOTE — PATIENT INSTRUCTIONS
The result of the test is called an INR level. Depending on the test results, your doctor or anticoagulation clinic may adjust your dose of warfarin.    Follow-up care is a key part of your treatment and safety. Be sure to make and go to all appointments, and call your doctor if you are having problems. It's also a good idea to know your test results and keep a list of the medicines you take.    How can you care for yourself at home?  Take warfarin safely  Take your warfarin at the same time each day.  If you miss a dose of warfarin, don't take an extra dose to make up for it. Your doctor can tell you exactly what to do so you don't take too much or too little.  Wear medical alert jewelry that lets others know that you take warfarin. You can buy this at most drugstores.  Don't take warfarin if you are pregnant or planning to get pregnant. Talk to your doctor about how you can prevent getting pregnant while you are taking it.  Don't change your dose or stop taking warfarin unless your doctor tells you to.  Effects of medicines and food on warfarin  Don't start or stop taking any medicines, vitamins, or natural remedies unless you first talk to your doctor. Many medicines can affect how warfarin works. These include aspirin and other pain relievers, over-the-counter medicines, multivitamins, dietary supplements, and herbal products.  Tell all of your doctors and pharmacists that you take warfarin. Some prescription medicines can affect how warfarin works.  Keep the amount of vitamin K in your diet about the same from day to day. Do not suddenly eat a lot more or a lot less food that is rich in vitamin K than you usually do. Vitamin K affects how warfarin works and how your blood clots. Talk with your doctor before making big changes in your diet. Vitamin K is in many foods, such as:  Leafy greens, such as kale, cabbage, spinach, Swiss chard, and lettuce.  Canola and soybean oils.  Green vegetables, such as asparagus,

## 2024-11-05 NOTE — PROGRESS NOTES
however, patient reports no falls since last [facility]. Since, patient has had three recent falls and his INR is supratherapeutic, patient will hold warfarin doses today () and tomorrow (). Starting Thursday (), patient will take warfarin 2.5 mg daily. Patient will recheck INR in 2 week(s).       Patient was instructed to schedule an appointment in 2 week(s) prior to leaving the clinic.    Medication reconciliation was completed during the visit.    There are no discontinued medications.    A full discussion of the nature of anticoagulants has been carried out.  A full discussion of the need for frequent and regular monitoring, precise dosage adjustment and compliance was stressed.  Side effects of potential bleeding were discussed and Mr. Kamara was instructed to call [phone] if there are any signs of abnormal bleeding.  Mr. Kamara was instructed to avoid any OTC items containing aspirin or ibuprofen and prior to starting any new OTC products to consult with his physician or pharmacist to ensure no drug interactions are present.  Mr. Kamara was instructed to avoid any major changes in his general diet and to avoid alcohol consumption.    Mr. Kamara was provided information in the AVS that includes topics on understanding coumadin therapy, drug interaction considerations, vitamin K and coumadin use, interactions with foods and supplements containing vitamin K, and the use of herbal products.    Mr. Kamara verbalized his understanding of all instructions and will call the office with any questions, concerns, or signs of abnormal bleeding or blood clot.  Notifications of recommendations will be sent to Dr. Jesus Lynn MD for review.    Thank you,  Steffanie Dawkins, PharmD      For Pharmacy Admin Tracking Only    Intervention Detail: Adherence Monitorin, Dose Adjustment: 1, reason: Therapy Optimization, and Lab(s) Ordered  Total # of Interventions

## 2024-11-06 LAB
ECHO AO ROOT DIAM: 3.3 CM
ECHO AO ROOT INDEX: 1.65 CM/M2
ECHO AV AREA PEAK VELOCITY: 2.3 CM2
ECHO AV AREA VTI: 2.1 CM2
ECHO AV AREA/BSA PEAK VELOCITY: 1.2 CM2/M2
ECHO AV AREA/BSA VTI: 1.1 CM2/M2
ECHO AV MEAN GRADIENT: 4 MMHG
ECHO AV MEAN VELOCITY: 0.9 M/S
ECHO AV PEAK GRADIENT: 8 MMHG
ECHO AV PEAK VELOCITY: 1.4 M/S
ECHO AV VELOCITY RATIO: 0.64
ECHO AV VTI: 21 CM
ECHO BSA: 2.03 M2
ECHO LA DIAMETER INDEX: 2.6 CM/M2
ECHO LA DIAMETER: 5.2 CM
ECHO LA TO AORTIC ROOT RATIO: 1.58
ECHO LA VOL A-L A2C: 84 ML (ref 18–58)
ECHO LA VOL A-L A4C: 99 ML (ref 18–58)
ECHO LA VOL BP: 89 ML (ref 18–58)
ECHO LA VOL MOD A2C: 80 ML (ref 18–58)
ECHO LA VOL MOD A4C: 89 ML (ref 18–58)
ECHO LA VOL/BSA BIPLANE: 45 ML/M2 (ref 16–34)
ECHO LA VOLUME AREA LENGTH: 96 ML
ECHO LA VOLUME INDEX A-L A2C: 42 ML/M2 (ref 16–34)
ECHO LA VOLUME INDEX A-L A4C: 50 ML/M2 (ref 16–34)
ECHO LA VOLUME INDEX AREA LENGTH: 48 ML/M2 (ref 16–34)
ECHO LA VOLUME INDEX MOD A2C: 40 ML/M2 (ref 16–34)
ECHO LA VOLUME INDEX MOD A4C: 45 ML/M2 (ref 16–34)
ECHO LV E' LATERAL VELOCITY: 13.4 CM/S
ECHO LV E' SEPTAL VELOCITY: 7.2 CM/S
ECHO LV EDV A2C: 89 ML
ECHO LV EDV A4C: 91 ML
ECHO LV EDV BP: 92 ML (ref 67–155)
ECHO LV EDV INDEX A4C: 46 ML/M2
ECHO LV EDV INDEX BP: 46 ML/M2
ECHO LV EDV NDEX A2C: 45 ML/M2
ECHO LV EF PHYSICIAN: 55 %
ECHO LV EJECTION FRACTION A2C: 52 %
ECHO LV EJECTION FRACTION A4C: 59 %
ECHO LV ESV A2C: 43 ML
ECHO LV ESV A4C: 37 ML
ECHO LV ESV BP: 42 ML (ref 22–58)
ECHO LV ESV INDEX A2C: 22 ML/M2
ECHO LV ESV INDEX A4C: 19 ML/M2
ECHO LV ESV INDEX BP: 21 ML/M2
ECHO LV FRACTIONAL SHORTENING: 26 % (ref 28–44)
ECHO LV INTERNAL DIMENSION DIASTOLE INDEX: 2.5 CM/M2
ECHO LV INTERNAL DIMENSION DIASTOLIC: 5 CM (ref 4.2–5.9)
ECHO LV INTERNAL DIMENSION SYSTOLIC INDEX: 1.85 CM/M2
ECHO LV INTERNAL DIMENSION SYSTOLIC: 3.7 CM
ECHO LV IVSD: 1.1 CM (ref 0.6–1)
ECHO LV MASS 2D: 182 G (ref 88–224)
ECHO LV MASS INDEX 2D: 91 G/M2 (ref 49–115)
ECHO LV POSTERIOR WALL DIASTOLIC: 0.9 CM (ref 0.6–1)
ECHO LV RELATIVE WALL THICKNESS RATIO: 0.36
ECHO LVOT AREA: 3.5 CM2
ECHO LVOT AV VTI INDEX: 0.61
ECHO LVOT DIAM: 2.1 CM
ECHO LVOT MEAN GRADIENT: 2 MMHG
ECHO LVOT PEAK GRADIENT: 4 MMHG
ECHO LVOT PEAK VELOCITY: 0.9 M/S
ECHO LVOT STROKE VOLUME INDEX: 22.2 ML/M2
ECHO LVOT SV: 44.3 ML
ECHO LVOT VTI: 12.8 CM
ECHO RV INTERNAL DIMENSION: 4 CM
ECHO TV REGURGITANT MAX VELOCITY: 2.64 M/S
ECHO TV REGURGITANT PEAK GRADIENT: 28 MMHG

## 2024-11-06 NOTE — RESULT ENCOUNTER NOTE
Your echocardiogram showed that your heart function is normal.  As you know, you are in atrial fibrillation which causes enlargement of the upper chambers of the heart so you have left atrial enlargement.  This is expected findings from your atrial fibrillation.  No other significant abnormality noted on echocardiogram.

## 2024-11-08 ENCOUNTER — HOSPITAL ENCOUNTER (OUTPATIENT)
Facility: HOSPITAL | Age: 74
Discharge: HOME | End: 2024-11-11
Attending: FAMILY MEDICINE
Payer: MEDICARE

## 2024-11-08 DIAGNOSIS — K76.9 LESION OF LIVER: ICD-10-CM

## 2024-11-08 PROCEDURE — 74170 CT ABD WO CNTRST FLWD CNTRST: CPT

## 2024-11-08 PROCEDURE — 6360000004 HC RX CONTRAST MEDICATION: Performed by: FAMILY MEDICINE

## 2024-11-08 RX ORDER — IOPAMIDOL 755 MG/ML
100 INJECTION, SOLUTION INTRAVASCULAR
Status: COMPLETED | OUTPATIENT
Start: 2024-11-08 | End: 2024-11-08

## 2024-11-08 RX ADMIN — IOPAMIDOL 100 ML: 755 INJECTION, SOLUTION INTRAVENOUS at 16:16

## 2024-11-11 NOTE — PROGRESS NOTES
Pharmacy Progress Note - Anticoagulation Management    S/O:  Mr. Herbie Kamara  is a 74 y.o. male seen today for anticoagulation management for the diagnosis of Atrial Fibrillation.     HPI: At last visit (11/5), patient's INR was 4.0 and supratherapeutic for an INR goal of 2.0-3.0. Patient denied any extra doses of warfarin and denied changes to his medications. Patient reported consistent intake of vitamin K foods. Per chart review, patient had an ED visit on 11/1/24 for SOB and palpitations. During the ED visit, patient's INR was 4.6 and supratherapeutic. Patient reported he was advised to hold warfarin dose on Friday (11/1). Patient reported taking warfarin 2.5 mg daily (11/2-11/4). Patient reported falling three times since September 2024; however, patient reported no falls since last INR check at the [facility]. Since, patient has had three recent falls and his INR is supratherapeutic, patient will hold warfarin doses today (11/5) and tomorrow (11/6). Starting Thursday (11/7), patient will take warfarin 2.5 mg daily. Patient will recheck INR in 2 week(s).    Interim History:    Warfarin start date: 10/24/24  INR Goal:  2.0-3.0    Current warfarin regimen: hold doses today (11/5) and tomorrow (11/6). Starting Thursday (11/7), take warfarin 2.5 mg daily                  Warfarin tablet strength:   5 mg   Duration of therapy: Indefinite      Results for orders placed or performed in visit on 11/12/24   POCT INR   Result Value Ref Range    Prothrombin time,(POC) 17.3     INR,(POC) 1.4 (A) 2.0 - 3.0       Today's pertinent positives includes:  No significant changes since last visit      Adherence:   Able to recall regimen? N/A  Miss/extra dose? YES, patient reports an upcoming kyphoplasty on 11/15/24. Patient reports since his INR was elevated at 4.0 on 11/5, he decided to hold all warfarin doses since 11/5.  Need refill? NO    Upcoming procedure(s):  YES    Patient reports an upcoming kyphoplasty on

## 2024-11-11 NOTE — PATIENT INSTRUCTIONS
vegetables, such as asparagus, broccoli, and [place] sprouts.  Vegetable drinks, green tea leaves, and some dietary supplement drinks.  Avoid cranberry juice and other cranberry products. They can increase the effects of warfarin.  Limit your use of alcohol.    Avoid bleeding by preventing falls and injuries  Wear slippers or shoes with nonskid soles.  Remove throw rugs and clutter.  Rearrange furniture and electrical cords to keep them out of walking paths.  Keep stairways, porches, and outside walkways well lit. Use night-lights in hallways and bathrooms.  Be extra careful when you work with sharp tools or knives.    When should you call for help?  Call 911 anytime you think you may need emergency care. For example, call if:  You have a sudden, severe headache that is different from past headaches.  Call your doctor now or seek immediate medical care if:  You have any abnormal bleeding, such as:  Nosebleeds.  Vaginal bleeding that is different (heavier, more frequent, at a different time of the month) than what you are used to.  Bloody or black stools, or rectal bleeding.  Bloody or pink urine.  Watch closely for changes in your health, and be sure to contact your doctor if you have any problems.    Where can you learn more?  Go to http://www.[employer].net/[employer].  Enter N655 in the search box to learn more about \"Taking Warfarin Safely: Care Instructions.\"  Current as of: January 27, 2016  Content Version: 11.1  © 2838-0248 [employer]. Care instructions adapted under license by [employer] (which disclaims liability or warranty for this information). If you have questions about a medical condition or this instruction, always ask your healthcare professional. [employer] disclaims any warranty or liability for your use of this information.    LYUDMILA Iglesias RN

## 2024-11-12 ENCOUNTER — ANTI-COAG VISIT (OUTPATIENT)
Facility: HOSPITAL | Age: 74
End: 2024-11-12
Payer: MEDICARE

## 2024-11-12 DIAGNOSIS — I48.19 PERSISTENT ATRIAL FIBRILLATION (HCC): Primary | ICD-10-CM

## 2024-11-12 LAB
INTERNATIONAL NORMALIZATION RATIO, POC: 1.4 (ref 2–3)
PROTHROMBIN TIME, POC: 17.3

## 2024-11-12 PROCEDURE — 99212 OFFICE O/P EST SF 10 MIN: CPT

## 2024-11-12 PROCEDURE — 85610 PROTHROMBIN TIME: CPT

## 2024-11-20 ENCOUNTER — PREP FOR PROCEDURE (OUTPATIENT)
Age: 74
End: 2024-11-20

## 2024-11-20 RX ORDER — SODIUM CHLORIDE 0.9 % (FLUSH) 0.9 %
5-40 SYRINGE (ML) INJECTION PRN
Status: CANCELLED | OUTPATIENT
Start: 2024-11-20

## 2024-11-20 RX ORDER — SODIUM CHLORIDE 9 MG/ML
INJECTION, SOLUTION INTRAVENOUS PRN
Status: CANCELLED | OUTPATIENT
Start: 2024-11-20

## 2024-11-20 RX ORDER — SODIUM CHLORIDE 0.9 % (FLUSH) 0.9 %
5-40 SYRINGE (ML) INJECTION EVERY 12 HOURS SCHEDULED
Status: CANCELLED | OUTPATIENT
Start: 2024-11-20

## 2024-11-21 ENCOUNTER — TELEPHONE (OUTPATIENT)
Age: 74
End: 2024-11-21

## 2024-11-21 NOTE — TELEPHONE ENCOUNTER
Patient returning call to us said we reached out, I don't show anything but upcoming procedure scheduled 11/29/24 please confirm    P#: [phone]

## 2024-11-21 NOTE — TELEPHONE ENCOUNTER
Patient needs Chest CTA prior to Watchmen next week    Attempted to reach patient by telephone. A message was left for return call.

## 2024-11-22 ENCOUNTER — TELEPHONE (OUTPATIENT)
Age: 74
End: 2024-11-22

## 2024-11-22 NOTE — PROGRESS NOTES
Attempted to call patient this morning but unable to reach.    I reviewed the CT abdomen scan findings which show innumerable hepatic metastatic lesions and pancreatic head mass. These findings are very concerning for advanced malignancy.    We were planning to do Watchman procedure for left atrial appendage occlusion due to his history of persistent atrial fibrillation with multiple falls and ataxia.    Unfortunately, due to these findings of abdominal malignancy, I think  we cannot proceed with scheduled Watchman procedure.    He was started on warfain in preparation for the procedure but at this time risks of bleeding with anticoagulation outweigh benefits of anticoagulation.    Therefore, I recommend to discontinue warfarin.    Recommend that he gets prompt treatment of malignancy with Oncology.

## 2024-11-22 NOTE — TELEPHONE ENCOUNTER
Verified patient with two identifiers. This RN called pt to inform on Dr Malcolm recommendations (see attached note below) of canceling the scheduled watchman procedure due CT scan results and to stop the medication of warfarin. Pt verbalized understanding and noted that he was currently admitted at [facility] receiving care and treatment.     Pt was informed to call back with any questions or concerns.

## 2024-11-26 ENCOUNTER — TELEPHONE (OUTPATIENT)
Age: 74
End: 2024-11-26

## 2024-11-26 NOTE — TELEPHONE ENCOUNTER
This RN returned pts wifes call regarding the watchman procedure and medications. Verified pt with two identifiers. Pt spouse, Nargis Garcia, listed in pts chart as emergency contact was making sure watchman procedure was canceled due to pts new diagnosis. Pt wife was also inquiring about stopping the warfarin and getting on pradaxa but hasn't started since last INR was 3.2. Pt wife wanted to check with Dr Lynn prior to doing so. This RN will check with MD about the pradaxa and INR result and will call back with any updates. Pt wife verbalized understanding

## 2024-11-26 NOTE — TELEPHONE ENCOUNTER
Verified pt with two identifiers. This RN called pt back regarding Dr Lynn's recommendations for pt to not take any blood thinners due to risk of bleeding. RN informed patient wife to also inform provider who prescribed medication and others that are following care. Pt wife verbalized understanding and will call with any questions or concerns

## 2024-12-09 ENCOUNTER — TELEPHONE (OUTPATIENT)
Age: 74
End: 2024-12-09

## 2024-12-11 ENCOUNTER — TELEPHONE (OUTPATIENT)
Age: 74
End: 2024-12-11

## 2024-12-11 NOTE — TELEPHONE ENCOUNTER
Called patient to reschedule consult he stated he is still in the hospital and will call back to reschedule when he can.

## 2024-12-17 ENCOUNTER — TELEPHONE (OUTPATIENT)
Facility: HOSPITAL | Age: 74
End: 2024-12-17

## 2024-12-17 NOTE — TELEPHONE ENCOUNTER
[facility]     Per Note from Ev Reynolds on 11/26/24:  Verified pt with two identifiers. This RN called pt back regarding Dr Lynn's recommendations for pt to not take any blood thinners due to risk of bleeding. RN informed patient wife to also inform provider who prescribed medication and others that are following care. Pt wife verbalized understanding and will call with any questions or concerns     Per Dr. Lynn's recommendation, the [facility] will sign off on patient's chart at this time for anticoagulation services for warfarin therapy.    Thank you.  Steffanie Dawkins, PharmD

## (undated) DEVICE — NEEDLE HYPO 25GA L1.5IN BVL ORIENTED ECLIPSE

## (undated) DEVICE — (D)PREP SKN CHLRAPRP APPL 26ML -- CONVERT TO ITEM 371833

## (undated) DEVICE — Device

## (undated) DEVICE — KENDALL RADIOLUCENT FOAM MONITORING ELECTRODE -RECTANGULAR SHAPE: Brand: KENDALL

## (undated) DEVICE — NEEDLE HYPO 18GA L1.5IN PNK S STL HUB POLYPR SHLD REG BVL

## (undated) DEVICE — SYR 3ML LL TIP 1/10ML GRAD --

## (undated) DEVICE — SYR 5ML 1/5 GRAD LL NSAF LF --

## (undated) DEVICE — BAG BELONG PT PERS CLEAR HANDL

## (undated) DEVICE — SKIN MARKER,REGULAR TIP WITH RULER AND LABELS: Brand: DEVON

## (undated) DEVICE — DEVON™ KNEE AND BODY STRAP 60" X 3" (1.5 M X 7.6 CM): Brand: DEVON

## (undated) DEVICE — Z CONVERTED USE 2271043 CONTAINER SPEC COLL 4OZ SCR ON LID PEEL PCH

## (undated) DEVICE — BASIN EMSIS 16OZ GRAPHITE PLAS KID SHP MOLD GRAD FOR ORAL

## (undated) DEVICE — NDL SPNE QNCKE 22GX5IN LF BLK --

## (undated) DEVICE — SOLIDIFIER MEDC 1200ML -- CONVERT TO 356117

## (undated) DEVICE — DRAPE,REIN 53X77,STERILE: Brand: MEDLINE

## (undated) DEVICE — CATH IV AUTOGRD BC BLU 22GA 25 -- INSYTE

## (undated) DEVICE — NDL FLTR TIP 5 MIC 18GX1.5IN --

## (undated) DEVICE — SPONGE: SPECIALTY PEANUT XR 100/CS: Brand: MEDICAL ACTION INDUSTRIES

## (undated) DEVICE — NEEDLE SPNL 22GA L3.5IN BLK HUB S STL REG WALL FIT STYL W/

## (undated) DEVICE — SET ADMIN 16ML TBNG L100IN 2 Y INJ SITE IV PIGGY BK DISP

## (undated) DEVICE — 1200 GUARD II KIT W/5MM TUBE W/O VAC TUBE: Brand: GUARDIAN

## (undated) DEVICE — 7" (18 CM) APPX 0.45 ML, PRESSURE INFUSION (400PSIG) EXT SET W/ MICROCLAVE™ CLEAR, PURPLE CLAMP, ROTATING LUER: Brand: ICU MEDICAL

## (undated) DEVICE — SYRINGE MED 5ML GLS MTL TIP LUERLOCK LOSS OF RESISTANCE

## (undated) DEVICE — KIT COLON W/ 1.1OZ LUB AND 2 END

## (undated) DEVICE — CUFF RMFG BP INF SZ 11 DISP -- LAWSON OEM ITEM 238915

## (undated) DEVICE — STERILE POLYISOPRENE POWDER-FREE SURGICAL GLOVES: Brand: PROTEXIS

## (undated) DEVICE — TOWEL SURG W17XL27IN STD BLU COT NONFENESTRATED PREWASHED

## (undated) DEVICE — SYR 1ML TB 1/100ML GRAD --

## (undated) DEVICE — NDL PRT INJ NSAF BLNT 18GX1.5 --

## (undated) DEVICE — GAUZE SPONGES,12 PLY: Brand: CURITY

## (undated) DEVICE — ADULT SPO2 SENSOR: Brand: NELLCOR